# Patient Record
Sex: MALE | Race: WHITE | NOT HISPANIC OR LATINO | Employment: OTHER | URBAN - METROPOLITAN AREA
[De-identification: names, ages, dates, MRNs, and addresses within clinical notes are randomized per-mention and may not be internally consistent; named-entity substitution may affect disease eponyms.]

---

## 2017-02-27 ENCOUNTER — OFFICE VISIT (OUTPATIENT)
Dept: FAMILY MEDICINE | Facility: CLINIC | Age: 57
End: 2017-02-27
Payer: COMMERCIAL

## 2017-02-27 VITALS
SYSTOLIC BLOOD PRESSURE: 116 MMHG | TEMPERATURE: 98.3 F | DIASTOLIC BLOOD PRESSURE: 72 MMHG | RESPIRATION RATE: 18 BRPM | BODY MASS INDEX: 39.28 KG/M2 | HEIGHT: 72 IN | WEIGHT: 290 LBS | HEART RATE: 100 BPM | OXYGEN SATURATION: 95 %

## 2017-02-27 DIAGNOSIS — L91.8 SKIN TAG: Primary | ICD-10-CM

## 2017-02-27 PROCEDURE — 11201 RMVL SKIN TAGS EA ADDL 10: CPT | Performed by: FAMILY MEDICINE

## 2017-02-27 PROCEDURE — 11200 RMVL SKIN TAGS UP TO&INC 15: CPT | Performed by: FAMILY MEDICINE

## 2017-02-27 NOTE — NURSING NOTE
"Chief Complaint   Patient presents with     Skin Tags     removal        Initial /72 (BP Location: Left arm, Patient Position: Chair, Cuff Size: Adult Large)  Pulse 100  Temp 98.3  F (36.8  C) (Tympanic)  Resp 18  Ht 5' 11.5\" (1.816 m)  Wt 290 lb (131.5 kg)  SpO2 95%  BMI 39.88 kg/m2 Estimated body mass index is 39.88 kg/(m^2) as calculated from the following:    Height as of this encounter: 5' 11.5\" (1.816 m).    Weight as of this encounter: 290 lb (131.5 kg).  Medication Reconciliation: complete     Yifan Fuentes MA      "

## 2017-02-27 NOTE — MR AVS SNAPSHOT
"              After Visit Summary   2/27/2017    Bladimir Lezama    MRN: [id number]           Patient Information     Date Of Birth          1960        Visit Information        Provider Department      2/27/2017 2:20 PM Marge Maurer MD [facility]        Today's Diagnoses     Skin tag    -  1       Follow-ups after your visit        Who to contact     If you have questions or need follow up information about today's clinic visit or your schedule please contact [facility] directly at [phone].  Normal or non-critical lab and imaging results will be communicated to you by Ge.tthart, letter or phone within 4 business days after the clinic has received the results. If you do not hear from us within 7 days, please contact the clinic through Iotelligentt or phone. If you have a critical or abnormal lab result, we will notify you by phone as soon as possible.  Submit refill requests through [employer] or call your pharmacy and they will forward the refill request to us. Please allow 3 business days for your refill to be completed.          Additional Information About Your Visit        MyChart Information     [employer] gives you secure access to your electronic health record. If you see a primary care provider, you can also send messages to your care team and make appointments. If you have questions, please call your primary care clinic.  If you do not have a primary care provider, please call [phone] and they will assist you.        Care EveryWhere ID     This is your Care EveryWhere ID. This could be used by other organizations to access your [place] medical records  [id number]        Your Vitals Were     Pulse Temperature Respirations Height Pulse Oximetry BMI (Body Mass Index)    100 98.3  F (36.8  C) (Tympanic) 18 5' 11.5\" (1.816 m) 95% 39.88 kg/m2       Blood Pressure from Last 3 Encounters:   02/27/17 116/72   10/31/16 124/76   01/13/16 118/78    Weight from Last 3 Encounters: "   02/27/17 290 lb (131.5 kg)   10/31/16 284 lb (128.8 kg)   01/13/16 278 lb 4 oz (126.2 kg)              We Performed the Following     REMOVAL OF SKIN TAGS, EA ADDTL 10     REMOVAL OF SKIN TAGS, FIRST 15        Primary Care Provider Office Phone # Fax #    Caryl Acosta [phone]       [facility] [address]  [facility] [address]        Thank you!     Thank you for choosing [facility]  for your care. Our goal is always to provide you with excellent care. Hearing back from our patients is one way we can continue to improve our services. Please take a few minutes to complete the written survey that you may receive in the mail after your visit with us. Thank you!             Your Updated Medication List - Protect others around you: Learn how to safely use, store and throw away your medicines at www.disposemymeds.org.          This list is accurate as of: 2/27/17 11:59 PM.  Always use your most recent med list.                   Brand Name Dispense Instructions for use    albuterol 108 (90 BASE) MCG/ACT Inhaler    PROAIR HFA/PROVENTIL HFA/VENTOLIN HFA    1 Inhaler    Inhale 2 puffs into the lungs every 6 hours as needed for shortness of breath / dyspnea or wheezing       ALLEGRA 180 MG tablet   Generic drug:  fexofenadine      Take 180 mg by mouth daily.       methylPREDNISolone 4 MG tablet    MEDROL DOSEPAK    21 tablet    Follow package instructions

## 2017-02-27 NOTE — PROGRESS NOTES
SUBJECTIVE:                                                    Bladimir Lezama is a 56 year old male who presents to clinic today for the following health issues:      Pt in to have skin tags removed. He has skin tag in the groin area which get aggravated with prolonged activity and exercise.       ASSESSMENT:  Skin tags    PLAN:  Using sterile iris scissors, multiple skin tags were snipped off at   their bases in the inner thighs after cleansing with alcohol wipes. As there were many numerous skin tags that were removed, I believe it was around 15-25 total tags that were removed. Bleeding was controlled   by pressure and drysol. Anesthetic was not required. The patient will be alert for any signs of cutaneous   infection, and call if there are any problems.      Marge Maurer MD

## 2017-05-08 ENCOUNTER — OFFICE VISIT (OUTPATIENT)
Dept: FAMILY MEDICINE | Facility: CLINIC | Age: 57
End: 2017-05-08
Payer: COMMERCIAL

## 2017-05-08 VITALS
WEIGHT: 290.75 LBS | TEMPERATURE: 98.5 F | SYSTOLIC BLOOD PRESSURE: 118 MMHG | BODY MASS INDEX: 39.99 KG/M2 | DIASTOLIC BLOOD PRESSURE: 80 MMHG | OXYGEN SATURATION: 93 % | HEART RATE: 103 BPM

## 2017-05-08 DIAGNOSIS — G57.12 MERALGIA PARESTHETICA OF LEFT SIDE: Primary | ICD-10-CM

## 2017-05-08 PROCEDURE — 99213 OFFICE O/P EST LOW 20 MIN: CPT | Performed by: FAMILY MEDICINE

## 2017-05-08 NOTE — MR AVS SNAPSHOT
After Visit Summary   5/8/2017    Bladimir Lezama    MRN: [id number]           Patient Information     Date Of Birth          1960        Visit Information        Provider Department      5/8/2017 6:20 PM Radha Zhang MD [facility]        Today's Diagnoses     Meralgia paresthetica of left side    -  1      Care Instructions    Try to wear looser trousers to take pressure off the left groin area where the sensory nerve passes.        Follow-ups after your visit        Who to contact     If you have questions or need follow up information about today's clinic visit or your schedule please contact [facility] directly at [phone].  Normal or non-critical lab and imaging results will be communicated to you by MyChart, letter or phone within 4 business days after the clinic has received the results. If you do not hear from us within 7 days, please contact the clinic through BeCoup[employer] or phone. If you have a critical or abnormal lab result, we will notify you by phone as soon as possible.  Submit refill requests through [employer] or call your pharmacy and they will forward the refill request to us. Please allow 3 business days for your refill to be completed.          Additional Information About Your Visit        MyChart Information     [employer] gives you secure access to your electronic health record. If you see a primary care provider, you can also send messages to your care team and make appointments. If you have questions, please call your primary care clinic.  If you do not have a primary care provider, please call [phone] and they will assist you.        Care EveryWhere ID     This is your Care EveryWhere ID. This could be used by other organizations to access your [place] medical records  [id number]        Your Vitals Were     Pulse Temperature Pulse Oximetry BMI (Body Mass Index)          103 98.5  F (36.9  C) (Oral) 93% 39.99 kg/m2         Blood Pressure  from Last 3 Encounters:   05/08/17 118/80   02/27/17 116/72   10/31/16 124/76    Weight from Last 3 Encounters:   05/08/17 290 lb 12 oz (131.9 kg)   02/27/17 290 lb (131.5 kg)   10/31/16 284 lb (128.8 kg)              Today, you had the following     No orders found for display       Primary Care Provider Office Phone # Fax #    Radha Zhang [phone]       [facility] [address] [facility] [address]        Thank you!     Thank you for choosing [facility]  for your care. Our goal is always to provide you with excellent care. Hearing back from our patients is one way we can continue to improve our services. Please take a few minutes to complete the written survey that you may receive in the mail after your visit with us. Thank you!             Your Updated Medication List - Protect others around you: Learn how to safely use, store and throw away your medicines at www.disposemymeds.org.          This list is accurate as of: 5/8/17  7:12 PM.  Always use your most recent med list.                   Brand Name Dispense Instructions for use    albuterol 108 (90 BASE) MCG/ACT Inhaler    PROAIR HFA/PROVENTIL HFA/VENTOLIN HFA    1 Inhaler    Inhale 2 puffs into the lungs every 6 hours as needed for shortness of breath / dyspnea or wheezing       ALLEGRA 180 MG tablet   Generic drug:  fexofenadine      Take 180 mg by mouth daily.       methylPREDNISolone 4 MG tablet    MEDROL DOSEPAK    21 tablet    Follow package instructions

## 2017-05-08 NOTE — PROGRESS NOTES
SUBJECTIVE:                                                    Bladimir Lezama is a 56 year old male who presents to clinic today for the following health issues:      Musculoskeletal problem/ Left leg pain      Duration:  1 year, intermittently    Description  Location: left lateral thigh    Intensity:  6/10    Accompanying signs and symptoms:  Burning/tingling sensation     History  Previous similar problem: YES  Previous evaluation:  orthopedic evaluation- sport therapy     Precipitating or alleviating factors:  Trauma or overuse: no  Aggravating factors include: standing and walking    Therapies tried and outcome: massage, stretching and ibuprofen- help a little bit with pain      Burning or needles sensation with slow walking.  Sometimes improves with brisker walking.  Numbness from left outer hip down lateral thigh  Pain does not go below knee.  Some leg swelling in the evenings  Some low back pain.    ROS:  GI/: No recent changes in bowel or bladder habits.   NEURO: No weakness of lower extremities     Problem list and histories reviewed & adjusted, as indicated.  Additional history:         Reviewed and updated as needed this visit by clinical staff  Tobacco  Allergies  Med Hx  Surg Hx  Fam Hx  Soc Hx          OBJECTIVE:                                                    /80 (BP Location: Left arm, Patient Position: Chair, Cuff Size: Adult Large)  Pulse 103  Temp 98.5  F (36.9  C) (Oral)  Wt 290 lb 12 oz (131.9 kg)  SpO2 93%  BMI 39.99 kg/m2  Body mass index is 39.99 kg/(m^2).  GEN:  no apparent distress, morbidly obese man  BACK:  No focal tenderness to palpation over the spinous processes and SI joints.    NEURO:  LE DTR's are 2+ and symmetric.  Negative SLR.   HIP:  left hip with full and painless ROM.  Negative Stinchfield. There is no tenderness to palpation over the greater trochanter.     Diagnostic Test Results:  none      ASSESSMENT/PLAN:                                                         1. Meralgia paresthetica of left side  I think this is meralgia paresthetica.  I showed him a picture of the sensory distribution involved and he noted this was consistent with the area of his symptoms.  Discussed that this can be difficult to treat and that first step is to wear looser trousers.  (He is wearing jeans to appt today.)   Discussed that weight loss would also be beneficial for this condition.  Discussed that we could also refer him to Physical Therapy or Sports Med if symptoms persist.  For pain he can use ice and occasional ibuprofen but recommended against routine (daily) use of NSAIDs.  I would consider gabapentin over daily NSAID.        Radha Zhang MD  [facility]

## 2017-05-08 NOTE — NURSING NOTE
"Chief Complaint   Patient presents with     Musculoskeletal Problem     nerve left leg pain       Initial /80 (BP Location: Left arm, Patient Position: Chair, Cuff Size: Adult Large)  Pulse 103  Temp 98.5  F (36.9  C) (Oral)  Wt 290 lb 12 oz (131.9 kg)  SpO2 93%  BMI 39.99 kg/m2 Estimated body mass index is 39.99 kg/(m^2) as calculated from the following:    Height as of 2/27/17: 5' 11.5\" (1.816 m).    Weight as of this encounter: 290 lb 12 oz (131.9 kg).  Medication Reconciliation: complete Debi Fuentes MA      "

## 2017-05-09 NOTE — PATIENT INSTRUCTIONS
Try to wear looser trousers to take pressure off the left groin area where the sensory nerve passes.

## 2018-06-25 ENCOUNTER — OFFICE VISIT (OUTPATIENT)
Dept: FAMILY MEDICINE | Facility: CLINIC | Age: 58
End: 2018-06-25
Payer: COMMERCIAL

## 2018-06-25 VITALS
BODY MASS INDEX: 38.6 KG/M2 | TEMPERATURE: 99.5 F | HEART RATE: 105 BPM | OXYGEN SATURATION: 94 % | WEIGHT: 285 LBS | SYSTOLIC BLOOD PRESSURE: 123 MMHG | HEIGHT: 72 IN | RESPIRATION RATE: 16 BRPM | DIASTOLIC BLOOD PRESSURE: 76 MMHG

## 2018-06-25 DIAGNOSIS — L91.8 SKIN TAG: Primary | ICD-10-CM

## 2018-06-25 PROBLEM — E66.01 MORBID OBESITY (H): Status: ACTIVE | Noted: 2018-06-25

## 2018-06-25 PROCEDURE — 11200 RMVL SKIN TAGS UP TO&INC 15: CPT | Performed by: FAMILY MEDICINE

## 2018-06-25 NOTE — PROGRESS NOTES
SUBJECTIVE:   Bladimir Lezama is a 57 year old male who presents to clinic today for the following health issues:      Skin Tags      Duration: a year ago    Description (location/character/radiation): inner upper thighs    Intensity:  mild    Accompanying signs and symptoms: they rub and get irritated    History (similar episodes/previous evaluation): recurrent - every couple years    Precipitating or alleviating factors: None    Therapies tried and outcome: He gets them removed every couple of years       Problem list and histories reviewed & adjusted, as indicated.  Additional history: as documented    BP Readings from Last 3 Encounters:   06/25/18 123/76   05/08/17 118/80   02/27/17 116/72    Wt Readings from Last 3 Encounters:   06/25/18 285 lb (129.3 kg)   05/08/17 290 lb 12 oz (131.9 kg)   02/27/17 290 lb (131.5 kg)          Reviewed and updated as needed this visit by clinical staff  Tobacco  Allergies  Med Hx  Surg Hx  Fam Hx  Soc Hx        ROS:  CONST: NEGATIVE for fevers, chills, fatigue  NEURO: NEGATIVE for headaches, dizziness  EYES: NEGATIVE for change in vision   ENT/M: NEGATIVE for change in hearing, dental problems   RESP: NEGATIVE for shortness of breath, cough   CV: NEGATIVE for chest pain, palpitations, peripheral edema  GI: NEGATIVE for nausea, abdominal pain, change in bowel habits  : NEGATIVE for change in urinary habits  INTEG/SKIN: NEGATIVE for rashes, new or changing moles  MS: NEGATIVE for significant arthralgias or myalgias  ENDO: NEGATIVE for change in weight or appetite  PSYCH: NEGATIVE for change in mood     OBJECTIVE:     /76 (BP Location: Right arm, Patient Position: Chair, Cuff Size: Adult Regular)  Pulse 105  Temp 99.5  F (37.5  C) (Tympanic)  Resp 16  Ht 6' (1.829 m)  Wt 285 lb (129.3 kg)  SpO2 94%  BMI 38.65 kg/m2  Body mass index is 38.65 kg/(m^2).  GEN:  no apparent distress  SKIN: multiple skin tags on upper inner thighs with surrounding post-inflammatory  hyperpigmentation    PROCEDURE:  Approximately 15 skin tags were removed with a sterile pick-ups and iris scissors after cleansing the skin with alcohol swab.  Hemostasis was obtained with drysol.    Diagnostic Test Results:  none     ASSESSMENT/PLAN:     1. Skin tag  - REMOVAL OF SKIN TAGS, FIRST 15     Radha Zhang MD  Formerly named [facility]

## 2018-06-25 NOTE — MR AVS SNAPSHOT
After Visit Summary   6/25/2018    Bladimir Lezama    MRN: [id number]           Patient Information     Date Of Birth          1960        Visit Information        Provider Department      6/25/2018 2:40 PM Radha Zhang MD [facility]        Today's Diagnoses     Skin tag    -  1       Follow-ups after your visit        Your next 10 appointments already scheduled     Jul 11, 2018  3:30 PM CDT   (Arrive by 3:15 PM)   New Patient Visit with ODILON Peralta MD   [facility] ([facility])    [address] [place] [id number] [phone]              Who to contact     If you have questions or need follow up information about today's clinic visit or your schedule please contact [facility] directly at [phone].  Normal or non-critical lab and imaging results will be communicated to you by MyChart, letter or phone within 4 business days after the clinic has received the results. If you do not hear from us within 7 days, please contact the clinic through MyChart or phone. If you have a critical or abnormal lab result, we will notify you by phone as soon as possible.  Submit refill requests through [employer] or call your pharmacy and they will forward the refill request to us. Please allow 3 business days for your refill to be completed.          Additional Information About Your Visit        MyChart Information     [employer] gives you secure access to your electronic health record. If you see a primary care provider, you can also send messages to your care team and make appointments. If you have questions, please call your primary care clinic.  If you do not have a primary care provider, please call [phone] and they will assist you.        Care EveryWhere ID     This is your Care EveryWhere ID. This could be used by other organizations to access your [place] medical records  [id number]        Your Vitals  Were     Pulse Temperature Respirations Height Pulse Oximetry BMI (Body Mass Index)    105 99.5  F (37.5  C) (Tympanic) 16 6' (1.829 m) 94% 38.65 kg/m2       Blood Pressure from Last 3 Encounters:   06/25/18 123/76   05/08/17 118/80   02/27/17 116/72    Weight from Last 3 Encounters:   06/25/18 285 lb (129.3 kg)   05/08/17 290 lb 12 oz (131.9 kg)   02/27/17 290 lb (131.5 kg)              We Performed the Following     REMOVAL OF SKIN TAGS, FIRST 15          Today's Medication Changes          These changes are accurate as of 6/25/18  6:43 PM.  If you have any questions, ask your nurse or doctor.               Stop taking these medicines if you haven't already. Please contact your care team if you have questions.     methylPREDNISolone 4 MG tablet   Commonly known as:  MEDROL DOSEPAK   Stopped by:  Radha Zhang MD                    Primary Care Provider Office Phone # Fax #    Radha Zhang [phone] [address] [facility] [address]        Equal Access to Services     [facility]: Hadii antoinette walker Sojolene, waaxda lusharon, qaybta kaalmada gonzalez, danielle nagy . So [facility] [phone].    ATENCIÓN: Si habla español, tiene a quinn disposición servicios gratuitos de asistencia lingüística. [facility] [phone].    We comply with applicable federal civil rights laws and [place] laws. We do not discriminate on the basis of race, color, national origin, age, disability, sex, sexual orientation, or gender identity.            Thank you!     Thank you for choosing [facility]  for your care. Our goal is always to provide you with excellent care. Hearing back from our patients is one way we can continue to improve our services. Please take a few minutes to complete the written survey that you may receive in the mail after your visit with us. Thank you!             Your Updated Medication List - Protect others around you: Learn how to  safely use, store and throw away your medicines at www.disposemymeds.org.          This list is accurate as of 6/25/18  6:43 PM.  Always use your most recent med list.                   Brand Name Dispense Instructions for use Diagnosis    albuterol 108 (90 Base) MCG/ACT Inhaler    PROAIR HFA/PROVENTIL HFA/VENTOLIN HFA    1 Inhaler    Inhale 2 puffs into the lungs every 6 hours as needed for shortness of breath / dyspnea or wheezing    Perennial allergic rhinitis, unspecified allergic rhinitis trigger       ALLEGRA 180 MG tablet   Generic drug:  fexofenadine      Take 180 mg by mouth daily.

## 2019-05-22 ENCOUNTER — OFFICE VISIT (OUTPATIENT)
Dept: FAMILY MEDICINE | Facility: CLINIC | Age: 59
End: 2019-05-22

## 2019-05-22 ENCOUNTER — HOSPITAL ENCOUNTER (OUTPATIENT)
Dept: ULTRASOUND IMAGING | Facility: CLINIC | Age: 59
Discharge: HOME | End: 2019-05-22
Attending: FAMILY MEDICINE | Admitting: FAMILY MEDICINE
Payer: COMMERCIAL

## 2019-05-22 VITALS
TEMPERATURE: 98.3 F | SYSTOLIC BLOOD PRESSURE: 114 MMHG | DIASTOLIC BLOOD PRESSURE: 76 MMHG | RESPIRATION RATE: 16 BRPM | WEIGHT: 287 LBS | OXYGEN SATURATION: 93 % | BODY MASS INDEX: 38.87 KG/M2 | HEIGHT: 72 IN | HEART RATE: 89 BPM

## 2019-05-22 DIAGNOSIS — Z78.9 HISTORY OF RECENT AIR TRAVEL: ICD-10-CM

## 2019-05-22 DIAGNOSIS — Z00.00 ROUTINE GENERAL MEDICAL EXAMINATION AT A HEALTH CARE FACILITY: Primary | ICD-10-CM

## 2019-05-22 DIAGNOSIS — R73.01 IMPAIRED FASTING GLUCOSE: ICD-10-CM

## 2019-05-22 DIAGNOSIS — R60.0 BILATERAL LOWER EXTREMITY EDEMA: ICD-10-CM

## 2019-05-22 DIAGNOSIS — N52.9 ERECTILE DYSFUNCTION, UNSPECIFIED ERECTILE DYSFUNCTION TYPE: ICD-10-CM

## 2019-05-22 LAB
ANION GAP SERPL CALCULATED.3IONS-SCNC: 4 MMOL/L (ref 3–14)
BUN SERPL-MCNC: 14 MG/DL (ref 7–30)
CALCIUM SERPL-MCNC: 9.2 MG/DL (ref 8.5–10.1)
CHLORIDE SERPL-SCNC: 104 MMOL/L (ref 94–109)
CHOLEST SERPL-MCNC: 166 MG/DL
CO2 SERPL-SCNC: 29 MMOL/L (ref 20–32)
CREAT SERPL-MCNC: 0.93 MG/DL (ref 0.66–1.25)
GFR SERPL CREATININE-BSD FRML MDRD: >90 ML/MIN/{1.73_M2}
GLUCOSE SERPL-MCNC: 172 MG/DL (ref 70–99)
HBA1C MFR BLD: 9.3 % (ref 0–5.6)
HDLC SERPL-MCNC: 38 MG/DL
LDLC SERPL CALC-MCNC: 100 MG/DL
NONHDLC SERPL-MCNC: 128 MG/DL
POTASSIUM SERPL-SCNC: 4.3 MMOL/L (ref 3.4–5.3)
SODIUM SERPL-SCNC: 137 MMOL/L (ref 133–144)
TRIGL SERPL-MCNC: 141 MG/DL

## 2019-05-22 PROCEDURE — 99396 PREV VISIT EST AGE 40-64: CPT | Performed by: FAMILY MEDICINE

## 2019-05-22 PROCEDURE — 80048 BASIC METABOLIC PNL TOTAL CA: CPT | Performed by: FAMILY MEDICINE

## 2019-05-22 PROCEDURE — 99215 OFFICE O/P EST HI 40 MIN: CPT | Mod: 25 | Performed by: FAMILY MEDICINE

## 2019-05-22 PROCEDURE — 36415 COLL VENOUS BLD VENIPUNCTURE: CPT | Performed by: FAMILY MEDICINE

## 2019-05-22 PROCEDURE — 80061 LIPID PANEL: CPT | Performed by: FAMILY MEDICINE

## 2019-05-22 PROCEDURE — 83036 HEMOGLOBIN GLYCOSYLATED A1C: CPT | Performed by: FAMILY MEDICINE

## 2019-05-22 PROCEDURE — 93970 EXTREMITY STUDY: CPT

## 2019-05-22 RX ORDER — SILDENAFIL CITRATE 20 MG/1
20-80 TABLET ORAL DAILY PRN
Qty: 30 TABLET | Refills: 0 | Status: SHIPPED | OUTPATIENT
Start: 2019-05-22 | End: 2019-05-23

## 2019-05-22 RX ORDER — FLUTICASONE PROPIONATE 50 MCG
1 SPRAY, SUSPENSION (ML) NASAL DAILY PRN
COMMUNITY
Start: 2019-05-22

## 2019-05-22 ASSESSMENT — MIFFLIN-ST. JEOR: SCORE: 2164.82

## 2019-05-22 NOTE — PATIENT INSTRUCTIONS
1) The Shingrix vaccination is a 2-shot series.  The second dose is given 6 months after the first.  (It cannot be given sooner than 2 months after the first dose - at the earliest.)  You should be aware that patients are reporting feeling a bit under the weather after the injection, including fatigue, malaise, and low-grade fever that may last a couple days.  Rarely patients can get a mild, shingles-like rash.   But these symptoms are much better than the Shingles disease.      2) Start your ED medication (sildenafil, cialis or levitra) with lowest dose.  These should not be taken more frequently than once daily.  Increase dose stepwise if inadequately effective.  ED drugs may cause flushing and headache.  Notify me of any visual changes or dizziness.  Go to the emergency room at a hospital for any erection lasting over 4 hours as this is a medical emergency.  Do not take with nitrates (such as nitroglycerin).  Always let medical providers know that you take ED medication and the time of your last dose.       3) Let me know if the skin nodule in your right forearm changes (enlarges, becomes red, swollen, tender).    Preventive Health Recommendations  Male Ages 50 - 64    Yearly exam:             See your health care provider every year in order to  o   Review health changes.   o   Discuss preventive care.    o   Review your medicines if your doctor has prescribed any.     Have a cholesterol test every 5 years, or more frequently if you are at risk for high cholesterol/heart disease.     Have a diabetes test (fasting glucose) every three years. If you are at risk for diabetes, you should have this test more often.     Have a colonoscopy at age 50, or have a yearly FIT test (stool test). These exams will check for colon cancer.      Talk with your health care provider about whether or not a prostate cancer screening test (PSA) is right for you.    You should be tested each year for STDs (sexually transmitted  diseases), if you re at risk.     Shots: Get a flu shot each year. Get a tetanus shot every 10 years.     Nutrition:    Eat at least 5 servings of fruits and vegetables daily.     Eat whole-grain bread, whole-wheat pasta and brown rice instead of white grains and rice.     Get adequate Calcium and Vitamin D.     Lifestyle    Exercise for at least 150 minutes a week (30 minutes a day, 5 days a week). This will help you control your weight and prevent disease.     Limit alcohol to one drink per day.     No smoking.     Wear sunscreen to prevent skin cancer.     See your dentist every six months for an exam and cleaning.     See your eye doctor every 1 to 2 years.

## 2019-05-22 NOTE — RESULT ENCOUNTER NOTE
Tono Garrison,  Your ultrasound shows no clots in the veins of your legs.  Happy travels and try to keep your legs moving on those long flights.  You can do toe/heel raises in your seat or get up and walk the aisles when you can.      Radha Zhang MD

## 2019-05-22 NOTE — RESULT ENCOUNTER NOTE
Tono Garrison,  Your Hemoglobin A1C (a test assessing overall blood sugar control for the last 3 months) from earlier today is quite elevated.  This is consistent with a diagnosis of Type 2 Diabetes.  We are still awaiting the results from your other blood tests.  We should discuss this further at your earliest convenience.  Please schedule an appointment with me when you return to [place].  At this A1c level I expect we can manage this with oral medications, diet, and exercise.      Radha Zhang MD

## 2019-05-22 NOTE — PROGRESS NOTES
"evSUBJECTIVE:   CC: Bladimir Lezama is an 58 year old male who presents for preventative health visit.     Healthy Habits:     Getting at least 3 servings of Calcium per day:  Yes    Bi-annual eye exam:  Yes    Dental care twice a year:  Yes    Sleep apnea or symptoms of sleep apnea:  None    Diet:  Regular (no restrictions)    Frequency of exercise:  2-3 days/week    Duration of exercise:  15-30 minutes    Taking medications regularly:  Not Applicable    Barriers to taking medications:  Not applicable    Medication side effects:  None    PHQ-2 Total Score: 0    Additional concerns today:  Yes    Other non-preventive concerns to address:  1) rash on calf of both legs x 1 week.  This started after extensive air travel (to [place], then [place]).  Both legs were red and slightly warm from mid-shin down to ankle.  Mild swelling.  Had \"burning\" sensation.  No chest pain, palpitations, or shortness of breath.  He tried taking \"venoruton\" (which he obtained at an [place] pharmacy for indication of venous insufficiency) and low dose aspirin.  Now improved.  He is leaving for [place] tomorrow and has friend who is a doctor there has scheduled him for a venous ultrasound there.      2) Cyst/nodule on right outer arm x 1 week, not painful.  3) ED Can achieve erection but has trouble sustaining erection.    Today's PHQ-2 Score:   PHQ-2 ( 1999 [employer]) 5/22/2019   Q1: Little interest or pleasure in doing things 0   Q2: Feeling down, depressed or hopeless 0   PHQ-2 Score 0   Q1: Little interest or pleasure in doing things -   Q2: Feeling down, depressed or hopeless -   PHQ-2 Score -       Abuse: Current or Past(Physical, Sexual or Emotional)- No  Do you feel safe in your environment? Yes    Social History     Tobacco Use     Smoking status: Never Smoker     Smokeless tobacco: Never Used   Substance Use Topics     Alcohol use: Yes     Comment: 1 glass of wine every few days     If you drink alcohol do you typically have >3 drinks per day " or >7 drinks per week? No    Alcohol Use 5/22/2019   Prescreen: >3 drinks/day or >7 drinks/week? -   Prescreen: >3 drinks/day or >7 drinks/week? No       Last PSA:   PSA   Date Value Ref Range Status   06/30/2011 1.14 0 - 4 ug/L Final       Reviewed orders with patient. Reviewed health maintenance and updated orders accordingly - Yes  BP Readings from Last 3 Encounters:   05/22/19 114/76   06/25/18 123/76   05/08/17 118/80    Wt Readings from Last 3 Encounters:   05/22/19 130.2 kg (287 lb)   06/25/18 129.3 kg (285 lb)   05/08/17 131.9 kg (290 lb 12 oz)                    Reviewed and updated as needed this visit by clinical staff  Tobacco  Allergies  Meds  Problems  Med Hx  Surg Hx  Fam Hx  Soc Hx          Reviewed and updated as needed this visit by Provider  Meds  Problems  Med Hx  Fam Hx        Past Medical History:   Diagnosis Date     Impaired fasting glucose 9/17/2014    Glucose 101 9/16/2014       Perennial allergic rhinitis       Past Surgical History:   Procedure Laterality Date     C APPENDECTOMY  1974     COLONOSCOPY  9/2011    2 hyperplastic polyps - no adenomas     TONSILLECTOMY  1970       Review of Systems  CONST: NEGATIVE for fevers/chills/sweats, unexplained weight loss/gain, and POSITIVE for fatigue  EYES: NEGATIVE for change in vision  ENT: NEGATIVE for difficulty hearing/tinnitus, and problems with teeth/gums  BREAST: NEGATIVE for breast lump/discharge  CV: NEGATIVE for chest pain/discomfort, leg pain with exercise, and palpitations  RESP: NEGATIVE for cough/wheeze, and difficulty breathing  GI: NEGATIVE for abdominal pain, blood in bowel movement, and nausea/vomiting/diarrhea  : NEGATIVE for nighttime urination, leaking urine, penile discharge, and concerns about sexual function  MS: NEGATIVE for muscle/joint pain  SKIN: NEGATIVE for rash or mole change  NEURO: NEGATIVE for headache, dizziness/lightheadedness, numbness, memory loss, and loss of coordination  PSYCH: NEGATIVE for  anxiety/stress, problems with sleep, and depression  HEME: NEGATIVE for unexplained lumps, and easy bruising/bleeding  ENDO: NEGATIVE for excessive thirst or urination  ALL: NEGATIVE for hay fever/allergies     OBJECTIVE:   /76 (BP Location: Left arm, Patient Position: Sitting, Cuff Size: Adult Large)   Pulse 89   Temp 98.3  F (36.8  C) (Oral)   Resp 16   Ht 1.829 m (6')   Wt 130.2 kg (287 lb)   SpO2 93%   BMI 38.92 kg/m      Physical Exam  GENERAL: healthy, alert and no distress  EYES: Eyes grossly normal to inspection, PERRL and conjunctivae and sclerae normal  HENT: ear canals and TM's normal, nose and mouth without ulcers or lesions  NECK: no adenopathy, no asymmetry, masses, or scars and thyroid normal to palpation  RESP: lungs clear to auscultation - no rales, rhonchi or wheezes  CV: regular rate and rhythm, normal S1 S2, no S3 or S4, no murmur, click or rub  ABDOMEN: soft, nontender, no hepatosplenomegaly, no masses   MS: no gross musculoskeletal defects noted, no edema  SKIN: no suspicious lesions or rashes  NEURO: Normal strength and tone, mentation intact and speech normal  PSYCH: mentation appears normal, affect normal/bright  BILATERAL LOWER EXTREMITIES:  with trace edema, no palpable cords, no warmth or redness but there is hemosiderin staining of the skin from mid-shin extending distally       ASSESSMENT/PLAN:     1. Routine general medical examination at a health care facility  - Lipid panel reflex to direct LDL Fasting    2. Impaired fasting glucose  - Basic metabolic panel  - Hemoglobin A1c    3. Bilateral lower extremity edema  4. History of recent air travel  Most likely this was an exacerbation of venous insufficiency but given his recent history of very long air travel I recommend venous ultrasound to rule out DVT.  He is leaving [place] tomorrow and I'd prefer to check the ultrasound prior to his leaving.  We scheduled him for that today.    - US Lower  Extremity Venous Duplex Bilateral; Future    5. Erectile dysfunction, unspecified erectile dysfunction type  Discussed use of ED medication.  Start with low dose.  Increase dose stepwise if inadequately effective.  ED drugs may cause flushing and headache.  Notify me of any visual changes or dizziness.  Go to ER for any erection lasting over 4 hours as this is a medical emergency.  Do not take with nitrates.  Always let medical providers know that you take ED medication and last dose.     - sildenafil (REVATIO) 20 MG tablet; Take 1-4 tablets (20-80 mg) by mouth daily as needed (30 minutes prior to intercourse)  Dispense: 30 tablet; Refill: 0     COUNSELING:   Reviewed preventive health counseling, as reflected in patient instructions    Estimated body mass index is 38.92 kg/m  as calculated from the following:    Height as of this encounter: 1.829 m (6').    Weight as of this encounter: 130.2 kg (287 lb).  Weight management plan: Discussed healthy diet and exercise guidelines          Counseling Resources:  ATP IV Guidelines  Pooled Cohorts Equation Calculator  FRAX Risk Assessment  ICSI Preventive Guidelines  Dietary Guidelines for Americans, 2010  USDA's MyPlate  ASA Prophylaxis  Lung CA Screening    Radha Zhang MD  [facility]

## 2019-05-23 ENCOUNTER — TELEPHONE (OUTPATIENT)
Dept: FAMILY MEDICINE | Facility: CLINIC | Age: 59
End: 2019-05-23

## 2019-05-23 DIAGNOSIS — N52.9 ERECTILE DYSFUNCTION, UNSPECIFIED ERECTILE DYSFUNCTION TYPE: ICD-10-CM

## 2019-05-23 RX ORDER — SILDENAFIL CITRATE 20 MG/1
20-80 TABLET ORAL DAILY PRN
Qty: 30 TABLET | Refills: 0 | Status: SHIPPED | OUTPATIENT
Start: 2019-05-23

## 2019-05-23 NOTE — TELEPHONE ENCOUNTER
Reason for Call:  Other call back    Detailed comments:  pt states the 2 prescription that he got from  was not covered by insurance and would like to discuss . Please advise.    Phone Number Patient can be reached at: Home number on file [phone] (home)    Best Time: asap    Can we leave a detailed message on this number? YES    Call taken on 5/23/2019 at 10:32 AM by Danielle Olivares

## 2019-05-23 NOTE — TELEPHONE ENCOUNTER
Patient was prescribed Sildenafil and his insurance does not cover.  Script had been sent to [facility] through [email or web address].  Patient is asking if a hard copy of the prescription can be mailed to his home address so he can find a pharmacy with lower cost.   Only need to call patient back if this is a problem otherwise mail to home address.  Local print prescription pended.  Stephania Zuniga RN

## 2019-05-23 NOTE — RESULT ENCOUNTER NOTE
Tono Garrison,  Your lipid panel (cholesterol) results look good and are stable compared to 3 years ago.  Your basic metabolic panel shows elevated blood sugar consistent with new diagnosis of diabetes but is otherwise normal (normal blood salts and kidney function).   We can talk more about that when you return to [place].  Radha Zhang MD

## 2019-05-24 NOTE — TELEPHONE ENCOUNTER
Place call to patient's home phone. No answer. Voicemail full.     Prescription has been mailed to the address that we have on file for patient.    SHAY Love

## 2019-06-04 ENCOUNTER — MYC MEDICAL ADVICE (OUTPATIENT)
Dept: FAMILY MEDICINE | Facility: CLINIC | Age: 59
End: 2019-06-04

## 2019-06-04 DIAGNOSIS — R22.9 SKIN NODULE: Primary | ICD-10-CM

## 2019-06-09 NOTE — PROGRESS NOTES
Subjective     Bladimir Lezama is a 58 year old male who presents to clinic today for the following health issues:    HPI   Here to discuss new diagnosis of T2DM. I saw patient for annual preventive visit a couple weeks ago and FBG was elevated at 172 while A1c was elevated at 9.3.  Since our last appointment he has read about DM on [employer] website and has cut down on carbs.  He feels better since making the dietary changes - more energy.      Recent Labs   Lab Test 05/22/19  0957 01/13/16  1010 09/16/14  0936   A1C 9.3*  --   --    * 117* 104   HDL 38* 40 57   TRIG 141 119 112   CR 0.93 0.95  --    GFRESTIMATED >90 82  --    GFRESTBLACK >90 >90  African American GFR Calc    --    POTASSIUM 4.3 4.1  --       BP Readings from Last 3 Encounters:   06/10/19 108/74   05/22/19 114/76   06/25/18 123/76    Wt Readings from Last 3 Encounters:   06/10/19 125.6 kg (277 lb)   05/22/19 130.2 kg (287 lb)   06/25/18 129.3 kg (285 lb)              Reviewed and updated as needed this visit by Provider  Meds  Problems         Review of Systems   ROS COMP: Constitutional, HEENT, cardiovascular, pulmonary, GI, , musculoskeletal, neuro, skin, endocrine and psych systems are negative, except as otherwise noted.      Objective    /74 (BP Location: Left arm, Patient Position: Sitting, Cuff Size: Adult Large)   Pulse 76   Temp 98  F (36.7  C) (Tympanic)   Resp 15   Wt 125.6 kg (277 lb)   SpO2 94%   BMI 37.57 kg/m    Body mass index is 37.57 kg/m .  Physical Exam   GEN:  no apparent distress     Diagnostic Test Results:  Labs reviewed in Epic        Assessment & Plan     25 minutes time spent face-to-face, with over 50% spent in counseling/coordination of care regarding:       ICD-10-CM    1. Type 2 diabetes mellitus with hyperglycemia, without long-term current use of insulin (H) E11.65 DIABETES EDUCATOR REFERRAL     blood glucose (NO BRAND SPECIFIED) test strip     blood glucose (NO BRAND SPECIFIED) lancets standard      metFORMIN (GLUCOPHAGE-XR) 500 MG 24 hr tablet     blood glucose monitoring (NO BRAND SPECIFIED) meter device kit     DISCONTINUED: blood glucose monitoring (NO BRAND SPECIFIED) meter device kit   2. Severe obesity (BMI 35.0-35.9 with comorbidity) (H) E66.01     Z68.35    3. Need for vaccination Z23 PPSV23, IM/SUBQ (2+ YRS) - [id number]     ADMIN 1st VACCINE        I had a long discussion with the patient regarding the pathophysiology and natural course of diabetes, role of laboratory monitoring, and the importance of blood sugar control for preventing complications of diabetes mellitus.  We covered the following topics:    Natural progressive course of diabetes and the importance of blood sugar control.  Discussed that controlled diet and weight loss can slow progression or potentially negate the need for medication.      Importance of diet and exercise for improved diabetic control.    Lab monitoring, including A1c testing and goals.    Medication options for diabetes.  Recommended metformin - discussed rationale for starting this early - preventing progression, weight-neutral, and no hypoglycemia.        Immunizations.  PPSV-23 and Hep B.  He is not sure if he received Hep B series so we will check titer with next lab test.    Dilated eye exam annually.  He just had this last week in [place].    Nutrition - limiting carbs and balancing carbs with protein and fats at every meal.  Referred to RD/CDE for further education and glucometer teaching.       Goals for discussion at next visit:    Sequelae of uncontrolled diabetes, including microvascular and macrovascular complications.    Lab monitoring, including ACR.    Statin guidelines for diabetes.  Reviewed that current guidelines recommend high-intensity statin and explained what that is and the rationale for the recommendation.      Immunizations.  Check Hep B titer.      BMI:   Estimated body mass index is 38.92 kg/m  as calculated from the following:    Height as  of 5/22/19: 1.829 m (6').    Weight as of 5/22/19: 130.2 kg (287 lb).   Weight management plan: Discussed healthy diet and exercise guidelines        See Patient Instructions    Return in about 2 months (around 8/10/2019) for Follow-up diabetes.    Radha Zhang MD  [facility]

## 2019-06-10 ENCOUNTER — OFFICE VISIT (OUTPATIENT)
Dept: FAMILY MEDICINE | Facility: CLINIC | Age: 59
End: 2019-06-10
Payer: COMMERCIAL

## 2019-06-10 VITALS
HEART RATE: 76 BPM | DIASTOLIC BLOOD PRESSURE: 74 MMHG | BODY MASS INDEX: 37.57 KG/M2 | RESPIRATION RATE: 15 BRPM | WEIGHT: 277 LBS | TEMPERATURE: 98 F | OXYGEN SATURATION: 94 % | SYSTOLIC BLOOD PRESSURE: 108 MMHG

## 2019-06-10 DIAGNOSIS — E66.01 SEVERE OBESITY (BMI 35.0-35.9 WITH COMORBIDITY) (H): ICD-10-CM

## 2019-06-10 DIAGNOSIS — Z23 NEED FOR VACCINATION: ICD-10-CM

## 2019-06-10 DIAGNOSIS — E11.65 TYPE 2 DIABETES MELLITUS WITH HYPERGLYCEMIA, WITHOUT LONG-TERM CURRENT USE OF INSULIN (H): Primary | ICD-10-CM

## 2019-06-10 PROBLEM — E11.9 TYPE 2 DIABETES MELLITUS (H): Status: ACTIVE | Noted: 2019-06-10

## 2019-06-10 PROCEDURE — 90732 PPSV23 VACC 2 YRS+ SUBQ/IM: CPT | Performed by: FAMILY MEDICINE

## 2019-06-10 PROCEDURE — 90471 IMMUNIZATION ADMIN: CPT | Performed by: FAMILY MEDICINE

## 2019-06-10 PROCEDURE — 99214 OFFICE O/P EST MOD 30 MIN: CPT | Mod: 25 | Performed by: FAMILY MEDICINE

## 2019-06-10 RX ORDER — METFORMIN HCL 500 MG
1000 TABLET, EXTENDED RELEASE 24 HR ORAL
Qty: 180 TABLET | Refills: 0 | Status: SHIPPED | OUTPATIENT
Start: 2019-06-10 | End: 2019-08-27

## 2019-06-10 NOTE — PATIENT INSTRUCTIONS
Patient Education     Understanding Type 2 Diabetes  When your body is working normally, the food you eat is digested and used as fuel. This fuel supplies energy to the body s cells. When you have diabetes, the fuel can t enter the cells. Without treatment, diabetes can cause serious long-term health problems.     Your body breaks down the food you eat into glucose.   How the body gets energy  The digestive system breaks down food, resulting in a sugar called glucose. Some of this glucose is stored in the liver. But most of it enters the bloodstream and travels to the cells to be used as fuel. Glucose needs the help of a hormone called insulin to enter the cells. Insulin is made in the pancreas. It is released into the bloodstream in response to the presence of glucose in the blood. Think of insulin as a key. When insulin reaches a cell, it attaches to the cell wall. This signals the cell to create an opening that allows glucose to enter the cell.      When you have type 2 diabetes  Early in type 2 diabetes, your cells don t respond properly to insulin. Because of this, less glucose than normal moves into cells. This is called insulin resistance. In response, the pancreas makes more insulin. But eventually, the pancreas can t produce enough insulin to overcome insulin resistance. As less and less glucose enters cells, it builds up to a harmful level in the bloodstream. This is known as high blood sugar or hyperglycemia. The result is type 2 diabetes. The cells become starved for energy, which can leave you feeling tired and rundown.  Why high blood sugar is a problem  If high blood sugar is not controlled, blood vessels throughout the body become damaged. Prolonged high blood sugar affects organs, blood vessels, and nerves. As a result, the risks of damage to the heart, kidneys, eyes, and limbs increase. Diabetes also makes other problems, such as high blood pressure and high cholesterol, more dangerous. Over  time, people with uncontrolled high blood sugar have an increase in risk of dying of, or being disabled by, heart attack, heart failure,  or stroke.  Date Last Reviewed: 7/1/2016 2000-2018 [employer]. [address] [place] [address], [place] [address]. All rights reserved. This information is not intended as a substitute for professional medical care. Always follow your healthcare professional's instructions.           Patient Education     Understanding Carbohydrates, Fats, and Protein  Food is a source of fuel and nourishment for your body. It s also a source of pleasure. Having diabetes doesn t mean you have to eat special foods or give up desserts. Instead, your dietitian can show you how to plan meals to suit your body. To start, learn how different foods affect blood sugar.  Carbohydrates  Carbohydrates are the main source of fuel for the body. Carbohydrates raise blood sugar. Many people think carbohydrates are only found in pasta or bread. But carbohydrates are actually in many kinds of foods:    Sugars occur naturally in foods such as fruit, milk, honey, and molasses. Sugars can also be added to many foods, from cereals and yogurt to candy and desserts. Sugars raise blood sugar.    Starches are found in bread, cereals, pasta, and dried beans. They re also found in corn, peas, potatoes, yam, acorn squash, and butternut squash. Starches also raise blood sugar.     Fiber is found in foods such as vegetables, fruits, beans, and whole grains. Unlike other carbs, fiber isn t digested or absorbed. So it doesn t raise blood sugar. In fact, fiber can help keep blood sugar from rising too fast. It also helps keep blood cholesterol at a healthy level.  Did you know?  Even though carbohydrates raise blood sugar, it s best to have some in every meal. They are an important part of a healthy diet.   Fat  Fat is an energy source that can be stored until needed. Fat does not raise blood sugar. However, it can raise  blood cholesterol, increasing the risk of heart disease. Fat is also high in calories, which can cause weight gain. Not all types of fat are the same.  More Healthy:    Monounsaturated fats are mostly found in vegetable oils, such as olive, canola, and peanut oils. They are also found in avocados and some nuts. Monounsaturated fats are healthy for your heart. That s because they lower LDL (unhealthy) cholesterol.    Polyunsaturated fats are mostly found in vegetable oils, such as corn, safflower, and soybean oils. They are also found in some seeds, nuts, and fish. Polyunsaturated fats lower LDL (unhealthy) cholesterol. So, choosing them instead of saturated fats is healthy for your heart. Certain unsaturated fats can help lower triglycerides.   Less Healthy:    Saturated fats are found in animal products, such as meat, poultry, whole milk, lard, and butter. Saturated fats raise LDL cholesterol and are not healthy for your heart.    Hydrogenated oils and trans fats are formed when vegetable oils are processed into solid fats. They are found in many processed foods. Hydrogenated oils and trans fats raise LDL cholesterol and lower HDL (healthy) cholesterol. They are not healthy for your heart.  Protein  Protein helps the body build and repair muscle and other tissue. Protein has little or no effect on blood sugar. However, many foods that contain protein also contain saturated fat. By choosing low-fat protein sources, you can get the benefits of protein without the extra fat:    Plant protein is found in dry beans and peas, nuts, and soy products, such as tofu and soymilk. These sources tend to be cholesterol-free and low in saturated fat.    Animal protein is found in fish, poultry, meat, cheese, milk, and eggs. These contain cholesterol and can be high in saturated fat. Aim for lean, lower-fat choices.  Date Last Reviewed: 3/1/2016    7469-3017 [employer]. [address] [place] [address], [place] [address]. All  rights reserved. This information is not intended as a substitute for professional medical care. Always follow your healthcare professional's instructions.         Start metformin with one tablet daily with food.  If you tolerate that you can increase to 2 tablets once daily with food.  If you tolerate that you can increase to 1 tablet in the morning with food and 2 tablets in the evening with food.

## 2019-06-10 NOTE — NURSING NOTE
Screening Questionnaire for Adult Immunization    Are you sick today?   No   Do you have allergies to medications, food, a vaccine component or latex?   No   Have you ever had a serious reaction after receiving a vaccination?   No   Do you have a long-term health problem with heart disease, lung disease, asthma, kidney disease, metabolic disease (e.g. diabetes), anemia, or other blood disorder?   No   Do you have cancer, leukemia, HIV/AIDS, or any other immune system problem?   No   In the past 3 months, have you taken medications that affect  your immune system, such as prednisone, other steroids, or anticancer drugs; drugs for the treatment of rheumatoid arthritis, Crohn s disease, or psoriasis; or have you had radiation treatments?   No   Have you had a seizure, or a brain or other nervous system problem?   No   During the past year, have you received a transfusion of blood or blood     products, or been given immune (gamma) globulin or antiviral drug?   No   For women: Are you pregnant or is there a chance you could become        pregnant during the next month?   No   Have you received any vaccinations in the past 4 weeks?   No     Immunization questionnaire answers were all negative.        Per orders of Dr. Zhang, injection of PCV23 given by Velma Jacinto. Patient instructed to remain in clinic for 15 minutes afterwards, and to report any adverse reaction to me immediately.       Screening performed by Velma Jacinto on 6/10/2019 at 9:21 AM.

## 2019-06-12 ENCOUNTER — TELEPHONE (OUTPATIENT)
Dept: FAMILY MEDICINE | Facility: CLINIC | Age: 59
End: 2019-06-12

## 2019-06-12 NOTE — TELEPHONE ENCOUNTER
Diabetes Education Scheduling Outreach #1:    Call to patient to schedule. Voicemail full.    Plan for 2nd outreach attempt within 1 week.    [facility]  Diabetes and Nutrition Scheduling

## 2019-06-27 NOTE — TELEPHONE ENCOUNTER
Diabetes Education Scheduling Outreach #2:    Call to patient to schedule. Voicemail full.      Sarah Douglas  [place] [facility]  Diabetes and Nutrition Scheduling

## 2019-08-27 ENCOUNTER — OFFICE VISIT (OUTPATIENT)
Dept: FAMILY MEDICINE | Facility: CLINIC | Age: 59
End: 2019-08-27
Payer: COMMERCIAL

## 2019-08-27 VITALS
RESPIRATION RATE: 15 BRPM | SYSTOLIC BLOOD PRESSURE: 102 MMHG | OXYGEN SATURATION: 94 % | TEMPERATURE: 97.3 F | HEART RATE: 70 BPM | WEIGHT: 262.5 LBS | DIASTOLIC BLOOD PRESSURE: 70 MMHG | BODY MASS INDEX: 35.6 KG/M2

## 2019-08-27 DIAGNOSIS — N52.9 ERECTILE DYSFUNCTION, UNSPECIFIED ERECTILE DYSFUNCTION TYPE: ICD-10-CM

## 2019-08-27 DIAGNOSIS — Z01.84 IMMUNITY STATUS TESTING: ICD-10-CM

## 2019-08-27 DIAGNOSIS — E66.01 SEVERE OBESITY (BMI 35.0-35.9 WITH COMORBIDITY) (H): ICD-10-CM

## 2019-08-27 DIAGNOSIS — J30.89 PERENNIAL ALLERGIC RHINITIS: ICD-10-CM

## 2019-08-27 DIAGNOSIS — E11.8 TYPE 2 DIABETES MELLITUS WITH COMPLICATION, WITHOUT LONG-TERM CURRENT USE OF INSULIN (H): Primary | ICD-10-CM

## 2019-08-27 LAB
ALBUMIN SERPL-MCNC: 4.1 G/DL (ref 3.4–5)
ALP SERPL-CCNC: 49 U/L (ref 40–150)
ALT SERPL W P-5'-P-CCNC: 45 U/L (ref 0–70)
ANION GAP SERPL CALCULATED.3IONS-SCNC: 7 MMOL/L (ref 3–14)
AST SERPL W P-5'-P-CCNC: 23 U/L (ref 0–45)
BASOPHILS # BLD AUTO: 0 10E9/L (ref 0–0.2)
BASOPHILS NFR BLD AUTO: 0.5 %
BILIRUB SERPL-MCNC: 0.9 MG/DL (ref 0.2–1.3)
BUN SERPL-MCNC: 16 MG/DL (ref 7–30)
CALCIUM SERPL-MCNC: 9 MG/DL (ref 8.5–10.1)
CHLORIDE SERPL-SCNC: 107 MMOL/L (ref 94–109)
CO2 SERPL-SCNC: 25 MMOL/L (ref 20–32)
CREAT SERPL-MCNC: 0.88 MG/DL (ref 0.66–1.25)
CREAT UR-MCNC: 100 MG/DL
DIFFERENTIAL METHOD BLD: NORMAL
EOSINOPHIL # BLD AUTO: 0.3 10E9/L (ref 0–0.7)
EOSINOPHIL NFR BLD AUTO: 4.1 %
ERYTHROCYTE [DISTWIDTH] IN BLOOD BY AUTOMATED COUNT: 13.8 % (ref 10–15)
GFR SERPL CREATININE-BSD FRML MDRD: >90 ML/MIN/{1.73_M2}
GLUCOSE SERPL-MCNC: 84 MG/DL (ref 70–99)
HBA1C MFR BLD: 5.9 % (ref 0–5.6)
HBV SURFACE AB SERPL IA-ACNC: 0 M[IU]/ML
HCT VFR BLD AUTO: 46.1 % (ref 40–53)
HGB BLD-MCNC: 15.3 G/DL (ref 13.3–17.7)
LYMPHOCYTES # BLD AUTO: 1.5 10E9/L (ref 0.8–5.3)
LYMPHOCYTES NFR BLD AUTO: 24.4 %
MCH RBC QN AUTO: 29.6 PG (ref 26.5–33)
MCHC RBC AUTO-ENTMCNC: 33.2 G/DL (ref 31.5–36.5)
MCV RBC AUTO: 89 FL (ref 78–100)
MICROALBUMIN UR-MCNC: 7 MG/L
MICROALBUMIN/CREAT UR: 6.66 MG/G CR (ref 0–17)
MONOCYTES # BLD AUTO: 0.8 10E9/L (ref 0–1.3)
MONOCYTES NFR BLD AUTO: 11.9 %
NEUTROPHILS # BLD AUTO: 3.7 10E9/L (ref 1.6–8.3)
NEUTROPHILS NFR BLD AUTO: 59.1 %
PLATELET # BLD AUTO: 172 10E9/L (ref 150–450)
POTASSIUM SERPL-SCNC: 4.4 MMOL/L (ref 3.4–5.3)
PROT SERPL-MCNC: 7.6 G/DL (ref 6.8–8.8)
RBC # BLD AUTO: 5.17 10E12/L (ref 4.4–5.9)
SODIUM SERPL-SCNC: 139 MMOL/L (ref 133–144)
TSH SERPL DL<=0.005 MIU/L-ACNC: 1.9 MU/L (ref 0.4–4)
WBC # BLD AUTO: 6.3 10E9/L (ref 4–11)

## 2019-08-27 PROCEDURE — 80053 COMPREHEN METABOLIC PANEL: CPT | Performed by: FAMILY MEDICINE

## 2019-08-27 PROCEDURE — 83036 HEMOGLOBIN GLYCOSYLATED A1C: CPT | Performed by: FAMILY MEDICINE

## 2019-08-27 PROCEDURE — 36415 COLL VENOUS BLD VENIPUNCTURE: CPT | Performed by: FAMILY MEDICINE

## 2019-08-27 PROCEDURE — 86706 HEP B SURFACE ANTIBODY: CPT | Performed by: FAMILY MEDICINE

## 2019-08-27 PROCEDURE — 99207 C FOOT EXAM  NO CHARGE: CPT | Performed by: FAMILY MEDICINE

## 2019-08-27 PROCEDURE — 84443 ASSAY THYROID STIM HORMONE: CPT | Performed by: FAMILY MEDICINE

## 2019-08-27 PROCEDURE — 85025 COMPLETE CBC W/AUTO DIFF WBC: CPT | Performed by: FAMILY MEDICINE

## 2019-08-27 PROCEDURE — 82043 UR ALBUMIN QUANTITATIVE: CPT | Performed by: FAMILY MEDICINE

## 2019-08-27 PROCEDURE — 99214 OFFICE O/P EST MOD 30 MIN: CPT | Performed by: FAMILY MEDICINE

## 2019-08-27 RX ORDER — METFORMIN HCL 500 MG
1000 TABLET, EXTENDED RELEASE 24 HR ORAL
Qty: 180 TABLET | Refills: 0 | Status: SHIPPED | OUTPATIENT
Start: 2019-08-27 | End: 2019-11-25

## 2019-08-27 RX ORDER — ATORVASTATIN CALCIUM 20 MG/1
20 TABLET, FILM COATED ORAL DAILY
Status: CANCELLED | OUTPATIENT
Start: 2019-08-27

## 2019-08-27 NOTE — PATIENT INSTRUCTIONS
Diabetes improved  continue with diet, exercise and weight loss  Continue metformin   Lab pending  dilated eye exam yearly  Foot exam regularly  Consider statin med to lower risk of cardiovascular disease from diabetes  BP nomral no need fo rBP meds but if has protein in urine may reocmend low dpse to protect kidneys  Flu shot yearly   considre shingles vaccine  Check Hepb status and if not immune get Hep b vaccination series  Return to see Dr Zhang in 3 month for recheck

## 2019-08-27 NOTE — RESULT ENCOUNTER NOTE
Higinio Mr. Lezama,   -TSH (thyroid stimulating hormone) level is normal which indicates normal thyroid function.. If you have any further concerns please do not hesitate to contact us by message, phone or making an appointment.  Have a good day   Dr Kevin ROBIN

## 2019-08-27 NOTE — PROGRESS NOTES
Subjective     Bladimir Lezama is a 59 year old male who presents to clinic today for the following health issues:    HPI   Diabetes Follow-up    How often are you checking your blood sugar? A few times a month    What time of day are you checking your blood sugars (select all that apply)?  Before meals and after meals randomly     Have you had any blood sugars above 200?  No    Have you had any blood sugars below 70?  No    What symptoms do you notice when your blood sugar is low?  Other: nervous     What concerns do you have today about your diabetes? None     Do you have any of these symptoms? (Select all that apply)  No numbness or tingling in feet.  No redness, sores or blisters on feet.  No complaints of excessive thirst.  No reports of blurry vision.  No significant changes to weight. - weight loss due to diet and feet problem only in the past      Have you had a diabetic eye exam in the last 12 months? Yes- Date of last eye exam: 8/2019    BP Readings from Last 2 Encounters:   08/27/19 102/70   06/10/19 108/74     Hemoglobin A1C (%)   Date Value   08/27/2019 5.9 (H)   05/22/2019 9.3 (H)     LDL Cholesterol Calculated (mg/dL)   Date Value   05/22/2019 100 (H)   01/13/2016 117 (H)   Diabetes Management Resources    How many servings of fruits and vegetables do you eat daily?  2-3    On average, how many sweetened beverages do you drink each day (soda, juice, sweet tea, etc)?   0    How many days per week do you miss taking your medication? 0    BMI > 38, DM newly dx in may 2019 on metformin, ED on revatio prn, seasonal allergies, perineal allergic rhinitis on Flonase prn, prior appy & tonsillectomy, hx of colonoscopy in 2011, 2 hyperplastic polyps & advised recheck in 10 yrs, on albuterol prn for reported allergy related wheezing in the past, no official dx of asthma not used in a couple years, seen by PCP Dr Zhang on 6/10/19, when new dx of DM discussed, started on metformin, referred to DM ed and given  pneumovax 23. Reported then he had done his dilated eye exam in [place] where he travels to often ( from there originally). He was not sure if he received Hep B series so was advised to check titers with his next lab test. Born and brought up in [place]. Then lived 10 yrs in [place]. Has two children from his first marriage. Moved to [place] 10 yrs ago when met his wife who is from here. 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  Has a dog and a cat in [place].       DM: checking sugars sporadically. Brought a print out. Average over 67 checks last 3 months about 118. tolerating metformin. Weight down 30 lbs since 2017 and 18 lbs since may 2019. Doing Mediterranean diet by [facility]. Declines to see DM ed. HBA1c improved from 9 to 5.9 today. Declines to start statin. Risk benefits discussed.     BMI: working on weight loss.     Allergic rhinitis stable. Albuterol given in [place] in the past for allergies when living in [place]. Was allergic to pollen seasonal allergies, sometimes pushing him to the edge of an asthma attack. But not used in past couple years  Declines a refill.     ED: uses revatio prn    Will do labs today also for Hep B status.     Has left thigh meralgia paresthetica and understands weight loss and DM control will help.     The 10-year ASCVD risk score (Kaleigh BUBBA Jr., et al., 2013) is: 9.6%    Values used to calculate the score:      Age: 59 years      Sex: Male      Is Non- : No      Diabetic: Yes      Tobacco smoker: No      Systolic Blood Pressure: 102 mmHg      Is BP treated: No      HDL Cholesterol: 38 mg/dL      Total Cholesterol: 166 mg/dL    Currently no fever or chills, no headache or dizziness, no double or blurry vision, no facial pain, earache, sore throat, runny nose, post nasal drip, no trouble hearing, smelling, tasting or swallowing, no cough , no chest pain, trouble breathing or palpitations, No abdominal pain, heart burn, reflux, nausea  or vomiting or diarrhea or constipation, no blood in stools or black stools, no weight loss or night sweats. No dysuria, hematuria, frequency, urgency, hesitancy, incontinence, No pelvic complaints. No leg swelling or joint pain. No rash.    Patient Active Problem List   Diagnosis     Perennial allergic rhinitis     Severe obesity (BMI 35.0-35.9 with comorbidity) (H)     Type 2 diabetes mellitus (H)     Erectile dysfunction, unspecified erectile dysfunction type     Past Surgical History:   Procedure Laterality Date     C APPENDECTOMY  1974     COLONOSCOPY  9/2011    2 hyperplastic polyps - no adenomas     TONSILLECTOMY  1970       Social History     Tobacco Use     Smoking status: Never Smoker     Smokeless tobacco: Never Used   Substance Use Topics     Alcohol use: Yes     Comment: 1 glass of wine every few days     Family History   Problem Relation Age of Onset     Cancer Father         stomach     Gastrointestinal Disease Father         ulcers         Current Outpatient Medications   Medication Sig Dispense Refill     albuterol (PROAIR HFA, PROVENTIL HFA, VENTOLIN HFA) 108 (90 BASE) MCG/ACT inhaler Inhale 2 puffs into the lungs every 6 hours as needed for shortness of breath / dyspnea or wheezing 1 Inhaler 1     blood glucose (NO BRAND SPECIFIED) lancets standard Use to test blood sugar 1 times daily or as directed. 100 each 3     blood glucose (NO BRAND SPECIFIED) test strip Use to test blood sugar 1 times daily or as directed. To accompany: Blood Glucose Monitor Brands: per insurance. 100 strip 6     blood glucose monitoring (NO BRAND SPECIFIED) meter device kit Use to test blood sugar 1 times daily or as directed. Preferred blood glucose meter per insurance. 1 kit 0     fluticasone (FLONASE) 50 MCG/ACT nasal spray Spray 1 spray into both nostrils daily as needed for rhinitis or allergies       metFORMIN (GLUCOPHAGE-XR) 500 MG 24 hr tablet Take 2 tablets (1,000 mg) by mouth daily (with dinner) 180 tablet 0      sildenafil (REVATIO) 20 MG tablet Take 1-4 tablets (20-80 mg) by mouth daily as needed (30 minutes prior to intercourse) 30 tablet 0     Allergies   Allergen Reactions     Seasonal Allergies      Recent Labs   Lab Test 08/27/19  0937 05/22/19  0957 01/13/16  1010 09/16/14  0936   A1C 5.9* 9.3*  --   --    LDL  --  100* 117* 104   HDL  --  38* 40 57   TRIG  --  141 119 112   ALT 45  --   --   --    CR 0.88 0.93 0.95  --    GFRESTIMATED >90 >90 82  --    GFRESTBLACK >90 >90 >90  African American GFR Calc    --    POTASSIUM 4.4 4.3 4.1  --    TSH 1.90  --   --   --       BP Readings from Last 3 Encounters:   08/27/19 102/70   06/10/19 108/74   05/22/19 114/76    Wt Readings from Last 3 Encounters:   08/27/19 119.1 kg (262 lb 8 oz)   06/10/19 125.6 kg (277 lb)   05/22/19 130.2 kg (287 lb)                    Reviewed and updated as needed this visit by Provider  Tobacco  Allergies  Meds  Problems  Med Hx  Surg Hx  Fam Hx  Soc Hx          Review of Systems   ROS COMP: Constitutional, HEENT, cardiovascular, pulmonary, GI, , musculoskeletal, neuro, skin, endocrine and psych systems are negative, except as otherwise noted.      Objective    /70 (BP Location: Left arm, Patient Position: Chair, Cuff Size: Adult Large)   Pulse 70   Temp 97.3  F (36.3  C) (Tympanic)   Resp 15   Wt 119.1 kg (262 lb 8 oz)   SpO2 94%   BMI 35.60 kg/m    Body mass index is 35.6 kg/m .  Physical Exam   GENERAL: alert, no distress and obese  EYES: Eyes grossly normal to inspection, PERRL and conjunctivae and sclerae normal  HENT: ear canals and TM's normal, nose and mouth without ulcers or lesions  NECK: no adenopathy, no asymmetry, masses, or scars and thyroid normal to palpation  RESP: lungs clear to auscultation - no rales, rhonchi or wheezes  CV: regular rate and rhythm, normal S1 S2, no S3 or S4, no murmur, click or rub, no peripheral edema and peripheral pulses strong  ABDOMEN: soft, non tender, no hepatosplenomegaly, no  masses and bowel sounds normal  MS: no gross musculoskeletal defects noted, no edema  SKIN: no suspicious lesions or rashes  NEURO: Normal strength and tone, mentation intact and speech normal  PSYCH: mentation appears normal, affect normal/bright  Diabetic foot exam: normal DP and PT pulses, no trophic changes or ulcerative lesions, normal sensory exam, normal monofilament exam, nail exam normal nails without lesions and normal vibration sense B/l.     Diagnostic Test Results:  Labs reviewed in Epic  No results found for this or any previous visit (from the past 24 hour(s)).        Assessment & Plan     1. Type 2 diabetes mellitus with complication, without long-term current use of insulin (H)  Diabetes improved. Discussed sequelae of uncontrolled diabetes, including microvascular and macrovascular complications. Lab monitoring, office visits & Statin guidelines for diabetes.  Reviewed that current guidelines recommend high-intensity statin and explained what that is and the rationale for the recommendation. Currently declines DM ed , feels doing well with Mediterranean diet through [facility] newsletter. Also currently declines a statin. [place] decision made to hold off after going through shared decision making in clinic today. He has made progress and DM now controlled. Advised to continue with diet, exercise and weight loss. Continue metformin 2 gram XR total daily. Rest of lab's pending. Reminded to do a dilated eye exam yearly and reported had one in [place] in June 2019 that was reported normal. Foot exam done normal and advised to check feet regularly. ED likely result of microvascular disease and control will help ED in the long run. Consider statin med in the future to lower risk of cardiovascular disease from diabetes. BP normal no need for BP meds but if has microalbuminuria may recommend low dose to protect his kidneys. Advised Flu shot yearly. Consider the shingles vaccine. Will check Hep B status  today and if not immune advised he get Hep B vaccination series of three at 0, 2 and 4 month intervals in an MA only apt. Return to see PCP Dr Zhang in 3 month for recheck.  - Albumin Random Urine Quantitative with Creat Ratio  - CBC with platelets differential  - Comprehensive metabolic panel  - C FOOT EXAM  NO CHARGE  - Hemoglobin A1c  - TSH with free T4 reflex  - metFORMIN (GLUCOPHAGE-XR) 500 MG 24 hr tablet; Take 2 tablets (1,000 mg) by mouth daily (with dinner)  Dispense: 180 tablet; Refill: 0    2. Severe obesity (BMI 35.0-35.9 with comorbidity) (H)  Working on diet, exercise dn weight loss, has already lost about 18 lbs on Mediterranean diet and congratulated. Advised to increase aerobic activity.   - CBC with platelets differential    3. Perennial allergic rhinitis  Uses Flonase and albuterol as needed.  - CBC with platelets differential    4. Erectile dysfunction, unspecified erectile dysfunction type  Likely related to microvascular disease. Controlling DM and optimizing vascular health will help.  - CBC with platelets differential    5. Immunity status testing  - Hepatitis B Surface Antibody     BMI:   Estimated body mass index is 35.6 kg/m  as calculated from the following:    Height as of 5/22/19: 1.829 m (6').    Weight as of this encounter: 119.1 kg (262 lb 8 oz).   Weight management plan: Discussed healthy diet and exercise guidelines  Work on weight loss  Regular exercise  See Patient Instructions    Return in about 3 months (around 11/27/2019).    Tomeka Brown MD  [facility]

## 2019-08-27 NOTE — RESULT ENCOUNTER NOTE
Haydeedelia Mr. Lezama,  Your results came back and are within acceptable limits. -Microalbumin (urine protein) test is normal.  ADVISE: rechecking this annually. Since this is normal and BP on lower end will defer starting you on a medication like ACE inhibitor but can revisit with Dr Zhang when you see her next or if anything changes before that. To protect the kidneys would recommend antiinflammatories as much as possble meds like aleve, advil, motrin etc. If you have any further concerns please do not hesitate to contact us by message, phone or making an appointment.  Have a good day   Dr Kevin ROBIN

## 2019-08-27 NOTE — RESULT ENCOUNTER NOTE
Higinio Mr. Lezama,  Your results came back showing no immunity against Hep B so recommend getting Hep B vaccine series of 3 shots at 0, 2 and 4 month intervals. Can schedule with medical assistant at your convenience.  If you have any further concerns please do not hesitate to contact us by message, phone or making an appointment.  Have a good day   Dr Kevin ROBIN

## 2019-08-27 NOTE — RESULT ENCOUNTER NOTE
Higinio Mr. Lezama,  Your results came back and are within acceptable limits. -Liver and gallbladder tests are normal (ALT,AST, Alk phos, bilirubin), kidney function is normal (Cr, GFR), sodium is normal, potassium is normal, calcium is normal, glucose is normal.. If you have any further concerns please do not hesitate to contact us by message, phone or making an appointment.  Have a good day   Dr Kevin ROBIN

## 2019-11-25 ENCOUNTER — OFFICE VISIT (OUTPATIENT)
Dept: FAMILY MEDICINE | Facility: CLINIC | Age: 59
End: 2019-11-25
Payer: COMMERCIAL

## 2019-11-25 VITALS
BODY MASS INDEX: 34.72 KG/M2 | SYSTOLIC BLOOD PRESSURE: 118 MMHG | HEART RATE: 81 BPM | DIASTOLIC BLOOD PRESSURE: 80 MMHG | TEMPERATURE: 98 F | OXYGEN SATURATION: 95 % | WEIGHT: 256 LBS

## 2019-11-25 DIAGNOSIS — Z11.4 ENCOUNTER FOR SCREENING FOR HIV: ICD-10-CM

## 2019-11-25 DIAGNOSIS — E78.5 HYPERLIPIDEMIA, UNSPECIFIED HYPERLIPIDEMIA TYPE: ICD-10-CM

## 2019-11-25 DIAGNOSIS — E11.8 TYPE 2 DIABETES MELLITUS WITH COMPLICATION, WITHOUT LONG-TERM CURRENT USE OF INSULIN (H): ICD-10-CM

## 2019-11-25 LAB
CHOLEST SERPL-MCNC: 193 MG/DL
HBA1C MFR BLD: 5.3 % (ref 0–5.6)
HDLC SERPL-MCNC: 52 MG/DL
HIV 1+2 AB+HIV1 P24 AG SERPL QL IA: NONREACTIVE
LDLC SERPL CALC-MCNC: 120 MG/DL
NONHDLC SERPL-MCNC: 141 MG/DL
TRIGL SERPL-MCNC: 107 MG/DL

## 2019-11-25 PROCEDURE — 80061 LIPID PANEL: CPT | Performed by: FAMILY MEDICINE

## 2019-11-25 PROCEDURE — 99214 OFFICE O/P EST MOD 30 MIN: CPT | Performed by: FAMILY MEDICINE

## 2019-11-25 PROCEDURE — 87389 HIV-1 AG W/HIV-1&-2 AB AG IA: CPT | Performed by: FAMILY MEDICINE

## 2019-11-25 PROCEDURE — 83036 HEMOGLOBIN GLYCOSYLATED A1C: CPT | Performed by: FAMILY MEDICINE

## 2019-11-25 PROCEDURE — 36415 COLL VENOUS BLD VENIPUNCTURE: CPT | Performed by: FAMILY MEDICINE

## 2019-11-25 RX ORDER — METFORMIN HCL 500 MG
1000 TABLET, EXTENDED RELEASE 24 HR ORAL
Qty: 180 TABLET | Refills: 1 | Status: SHIPPED | OUTPATIENT
Start: 2019-11-25 | End: 2020-05-29

## 2019-11-27 ENCOUNTER — TRANSFERRED RECORDS (OUTPATIENT)
Dept: HEALTH INFORMATION MANAGEMENT | Facility: CLINIC | Age: 59
End: 2019-11-27

## 2019-11-27 ENCOUNTER — MYC MEDICAL ADVICE (OUTPATIENT)
Dept: FAMILY MEDICINE | Facility: CLINIC | Age: 59
End: 2019-11-27

## 2019-11-27 LAB — RETINOPATHY: NEGATIVE

## 2019-11-27 NOTE — TELEPHONE ENCOUNTER
Radha Zhang MD,  Please see patient's Insurance [employer] message and attached media    Please advise    Thank You!  Yokasta Rivas, COCO  Triage Nurse

## 2019-12-02 NOTE — TELEPHONE ENCOUNTER
"Team coordinators-Please print off attachment and send to abstraction.    Writer received the following communication:  \"Tono Jansen,     Thank you for your message.  At this time we do not abstract results from patient uploaded records.  I apologize for the inconvenience.  Please let me know if you have any questions about this.     Thanks,   Sharmaine NYE, Abstracting Team  \"    Thank you!  LUCRECIA Arce, RN    "

## 2020-03-01 ENCOUNTER — HEALTH MAINTENANCE LETTER (OUTPATIENT)
Age: 60
End: 2020-03-01

## 2020-06-01 ENCOUNTER — VIRTUAL VISIT (OUTPATIENT)
Dept: FAMILY MEDICINE | Facility: CLINIC | Age: 60
End: 2020-06-01
Payer: COMMERCIAL

## 2020-06-01 ENCOUNTER — MYC MEDICAL ADVICE (OUTPATIENT)
Dept: FAMILY MEDICINE | Facility: CLINIC | Age: 60
End: 2020-06-01

## 2020-06-01 VITALS — BODY MASS INDEX: 33.13 KG/M2 | WEIGHT: 250 LBS | HEIGHT: 73 IN

## 2020-06-01 DIAGNOSIS — E11.9 TYPE 2 DIABETES MELLITUS WITHOUT COMPLICATION, WITHOUT LONG-TERM CURRENT USE OF INSULIN (H): Primary | ICD-10-CM

## 2020-06-01 PROCEDURE — 99213 OFFICE O/P EST LOW 20 MIN: CPT | Mod: 95 | Performed by: FAMILY MEDICINE

## 2020-06-01 ASSESSMENT — MIFFLIN-ST. JEOR: SCORE: 2007.87

## 2020-06-01 NOTE — PATIENT INSTRUCTIONS
Schedule a fasting lab-only appointment.  Fast for 10 hours prior to labs: nothing to eat or drink except plain water and your pills for ten hours prior to appointment.  In addition, avoid fatty foods and alcohol for 24 hours prior to appointment.

## 2020-06-01 NOTE — PROGRESS NOTES
"Bladimir Lezama is a 59 year old male who is being evaluated via a billable video visit.      The patient has been notified of following:     \"This video visit will be conducted via a call between you and your physician/provider. We have found that certain health care needs can be provided without the need for an in-person physical exam.  This service lets us provide the care you need with a video conversation.  If a prescription is necessary we can send it directly to your pharmacy.  If lab work is needed we can place an order for that and you can then stop by our lab to have the test done at a later time.    Video visits are billed at different rates depending on your insurance coverage.  Please reach out to your insurance provider with any questions.    If during the course of the call the physician/provider feels a video visit is not appropriate, you will not be charged for this service.\"    Patient has given verbal consent for Video visit? Yes    How would you like to obtain your AVS? Mail a copy    Patient would like the video invitation sent by: Text to cell phone: [phone]    Will anyone else be joining your video visit? No      Subjective     Bladimir Lezama is a 59 year old male who presents today via video visit for the following health issues:    HPI  Diabetes Follow-up    How often are you checking your blood sugar? A few times a week in AM - BG usually 115-120  What time of day are you checking your blood sugars (select all that apply)?  Before meals  Have you had any blood sugars above 200?  No  Have you had any blood sugars below 70?  No    What symptoms do you notice when your blood sugar is low?  None    What concerns do you have today about your diabetes? None     Do you have any of these symptoms? (Select all that apply)  No numbness or tingling in feet.  No redness, sores or blisters on feet.  No complaints of excessive thirst.  No reports of blurry vision.  No significant changes to " "weight.      BP Readings from Last 2 Encounters:   11/25/19 118/80   08/27/19 102/70     Hemoglobin A1C (%)   Date Value   11/25/2019 5.3   08/27/2019 5.9 (H)     LDL Cholesterol Calculated (mg/dL)   Date Value   11/25/2019 120 (H)   05/22/2019 100 (H)             How many servings of fruits and vegetables do you eat daily?  4 or more    On average, how many sweetened beverages do you drink each day (Examples: soda, juice, sweet tea, etc.  Do NOT count diet or artificially sweetened beverages)?   0    How many days per week do you exercise enough to make your heart beat faster? 7    How many minutes a day do you exercise enough to make your heart beat faster? 30 - 60    How many days per week do you miss taking your medication? 0         Video Start Time: 11:35 AM        Recent Labs   Lab Test 11/25/19  0839 08/27/19  0937 05/22/19  0957 01/13/16  1010   A1C 5.3 5.9* 9.3*  --    *  --  100* 117*   HDL 52  --  38* 40   TRIG 107  --  141 119   ALT  --  45  --   --    CR  --  0.88 0.93 0.95   GFRESTIMATED  --  >90 >90 82   GFRESTBLACK  --  >90 >90 >90  African American GFR Calc     POTASSIUM  --  4.4 4.3 4.1   TSH  --  1.90  --   --       BP Readings from Last 3 Encounters:   11/25/19 118/80   08/27/19 102/70   06/10/19 108/74    Wt Readings from Last 3 Encounters:   06/01/20 113.4 kg (250 lb)   11/25/19 116.1 kg (256 lb)   08/27/19 119.1 kg (262 lb 8 oz)                    Reviewed and updated as needed this visit by Provider  Meds  Problems         Review of Systems   RESP:  NEGATIVE for shortness of breath  CV:  NEGATIVE for chest pain        Objective    Ht 1.854 m (6' 1\")   Wt 113.4 kg (250 lb)   BMI 32.98 kg/m    Estimated body mass index is 32.98 kg/m  as calculated from the following:    Height as of this encounter: 1.854 m (6' 1\").    Weight as of this encounter: 113.4 kg (250 lb).  Physical Exam   GENERAL: Healthy, alert and no distress  EYES: Eyes grossly normal to inspection.  No discharge or " "erythema, or obvious scleral/conjunctival abnormalities.  RESP: No audible wheeze, cough, or visible cyanosis.  No visible retractions or increased work of breathing.    SKIN: Visible skin clear. No significant rash, abnormal pigmentation or lesions.  NEURO: Cranial nerves grossly intact.  Mentation and speech appropriate for age.  PSYCH: Mentation appears normal, affect normal/bright, judgement and insight intact, normal speech and appearance well-groomed.      Diagnostic Test Results:  Labs reviewed in Epic        Assessment & Plan       ICD-10-CM    1. Type 2 diabetes mellitus without complication, without long-term current use of insulin (H)  E11.9 **A1C FUTURE anytime     **Basic metabolic panel FUTURE anytime     Albumin Random Urine Quantitative with Creat Ratio     Lipid panel reflex to direct LDL Fasting        BMI:   Estimated body mass index is 32.98 kg/m  as calculated from the following:    Height as of this encounter: 1.854 m (6' 1\").    Weight as of this encounter: 113.4 kg (250 lb).   Weight management plan: Discussed healthy diet and exercise guidelines        Patient Instructions   Schedule a fasting lab-only appointment.  Fast for 10 hours prior to labs: nothing to eat or drink except plain water and your pills for ten hours prior to appointment.  In addition, avoid fatty foods and alcohol for 24 hours prior to appointment.       Return in about 6 months (around 12/1/2020) for follow-up medical condition(s).    Radha Zhang MD  [facility]      Video-Visit Details    Type of service:  Video Visit    Video End Time:11:47 AM    Originating Location (pt. Location): Home    Distant Location (provider location):  [facility]     Platform used for Video Visit: Abdirahman Zhang MD        "

## 2020-08-20 ENCOUNTER — MYC REFILL (OUTPATIENT)
Dept: FAMILY MEDICINE | Facility: CLINIC | Age: 60
End: 2020-08-20

## 2020-08-20 DIAGNOSIS — E11.8 TYPE 2 DIABETES MELLITUS WITH COMPLICATION, WITHOUT LONG-TERM CURRENT USE OF INSULIN (H): ICD-10-CM

## 2020-08-21 RX ORDER — METFORMIN HCL 500 MG
1000 TABLET, EXTENDED RELEASE 24 HR ORAL
Qty: 180 TABLET | Refills: 0 | Status: SHIPPED | OUTPATIENT
Start: 2020-08-21 | End: 2020-11-15

## 2020-08-21 NOTE — TELEPHONE ENCOUNTER
Prescription approved per [facility] Refill Protocol.  Dori Caba RN

## 2020-08-24 DIAGNOSIS — E11.9 TYPE 2 DIABETES MELLITUS WITHOUT COMPLICATION, WITHOUT LONG-TERM CURRENT USE OF INSULIN (H): ICD-10-CM

## 2020-08-24 LAB
ANION GAP SERPL CALCULATED.3IONS-SCNC: 6 MMOL/L (ref 3–14)
BUN SERPL-MCNC: 20 MG/DL (ref 7–30)
CALCIUM SERPL-MCNC: 9.1 MG/DL (ref 8.5–10.1)
CHLORIDE SERPL-SCNC: 109 MMOL/L (ref 94–109)
CHOLEST SERPL-MCNC: 152 MG/DL
CO2 SERPL-SCNC: 25 MMOL/L (ref 20–32)
CREAT SERPL-MCNC: 0.88 MG/DL (ref 0.66–1.25)
GFR SERPL CREATININE-BSD FRML MDRD: >90 ML/MIN/{1.73_M2}
GLUCOSE SERPL-MCNC: 102 MG/DL (ref 70–99)
HBA1C MFR BLD: 5.5 % (ref 0–5.6)
HDLC SERPL-MCNC: 49 MG/DL
LDLC SERPL CALC-MCNC: 84 MG/DL
NONHDLC SERPL-MCNC: 103 MG/DL
POTASSIUM SERPL-SCNC: 3.8 MMOL/L (ref 3.4–5.3)
SODIUM SERPL-SCNC: 140 MMOL/L (ref 133–144)
TRIGL SERPL-MCNC: 97 MG/DL

## 2020-08-24 PROCEDURE — 80061 LIPID PANEL: CPT | Performed by: FAMILY MEDICINE

## 2020-08-24 PROCEDURE — 82043 UR ALBUMIN QUANTITATIVE: CPT | Performed by: FAMILY MEDICINE

## 2020-08-24 PROCEDURE — 83036 HEMOGLOBIN GLYCOSYLATED A1C: CPT | Performed by: FAMILY MEDICINE

## 2020-08-24 PROCEDURE — 80048 BASIC METABOLIC PNL TOTAL CA: CPT | Performed by: FAMILY MEDICINE

## 2020-08-24 PROCEDURE — 36415 COLL VENOUS BLD VENIPUNCTURE: CPT | Performed by: FAMILY MEDICINE

## 2020-08-24 NOTE — RESULT ENCOUNTER NOTE
Higinio Mr. Lezama,  Your results came back and are within acceptable limits. -A1C (test of diabetes control the last 2-3 months) is at your goal. Please recheck your A1C test in 3 months. . If you have any further concerns please do not hesitate to contact us by message, phone or making an appointment.  Have a good day   Dr Kevin ROBIN in your PCP's absence

## 2020-08-25 LAB
CREAT UR-MCNC: 159 MG/DL
MICROALBUMIN UR-MCNC: 11 MG/L
MICROALBUMIN/CREAT UR: 7.11 MG/G CR (ref 0–17)

## 2020-08-25 NOTE — RESULT ENCOUNTER NOTE
Haydeedelia Mr. Lezama,  Your results came back and are within acceptable limits. -Cholesterol levels are at your goal levels.  ADVISE: continuing your medication, a regular exercise program with at least 150 minutes of aerobic exercise per week, and eating a low saturated fat/low carbohydrate diet.  Also, you should recheck this fasting cholesterol panel in 12 months.  -Kidney function (GFR) is normal.  -Sodium is normal.  -Potassium is normal.  -Calcium is normal.  -Glucose is elevated due to your diabetes.. If you have any further concerns please do not hesitate to contact us by message, phone or making an appointment.  Have a good day   Dr Kevin ROBIN

## 2020-08-29 NOTE — RESULT ENCOUNTER NOTE
Tono Garrison,  This all looks great!  Your lipid panel (cholesterol) results are excellent and your urine albumin (protein) level is normal.  Urine albumin is a test for microscopic proteins in the urine - a sign of early kidney disease from diabetes. Keeping blood sugar controlled helps keep the kidneys healthy.  I also recommend staying hydrated and avoiding frequent use of over-the-counter NSAID medications (ibuprofen, naproxen, advil, motrin, aleve).     Radha Zhang MD

## 2020-11-14 ENCOUNTER — MYC REFILL (OUTPATIENT)
Dept: FAMILY MEDICINE | Facility: CLINIC | Age: 60
End: 2020-11-14

## 2020-11-14 DIAGNOSIS — E11.8 TYPE 2 DIABETES MELLITUS WITH COMPLICATION, WITHOUT LONG-TERM CURRENT USE OF INSULIN (H): ICD-10-CM

## 2020-11-14 RX ORDER — METFORMIN HCL 500 MG
1000 TABLET, EXTENDED RELEASE 24 HR ORAL
Qty: 180 TABLET | Refills: 0 | Status: CANCELLED | OUTPATIENT
Start: 2020-11-14

## 2020-11-15 ENCOUNTER — MYC REFILL (OUTPATIENT)
Dept: FAMILY MEDICINE | Facility: CLINIC | Age: 60
End: 2020-11-15

## 2020-11-15 DIAGNOSIS — E11.8 TYPE 2 DIABETES MELLITUS WITH COMPLICATION, WITHOUT LONG-TERM CURRENT USE OF INSULIN (H): ICD-10-CM

## 2020-11-17 RX ORDER — METFORMIN HCL 500 MG
1000 TABLET, EXTENDED RELEASE 24 HR ORAL
Qty: 180 TABLET | Refills: 0 | Status: SHIPPED | OUTPATIENT
Start: 2020-11-17 | End: 2021-02-17

## 2020-11-17 RX ORDER — METFORMIN HCL 500 MG
TABLET, EXTENDED RELEASE 24 HR ORAL
Qty: 180 TABLET | Refills: 0 | OUTPATIENT
Start: 2020-11-17

## 2020-12-14 ENCOUNTER — HEALTH MAINTENANCE LETTER (OUTPATIENT)
Age: 60
End: 2020-12-14

## 2021-02-12 DIAGNOSIS — E11.8 TYPE 2 DIABETES MELLITUS WITH COMPLICATION, WITHOUT LONG-TERM CURRENT USE OF INSULIN (H): ICD-10-CM

## 2021-02-17 RX ORDER — METFORMIN HCL 500 MG
TABLET, EXTENDED RELEASE 24 HR ORAL
Qty: 180 TABLET | Refills: 0 | Status: SHIPPED | OUTPATIENT
Start: 2021-02-17 | End: 2021-05-06

## 2021-03-30 ENCOUNTER — IMMUNIZATION (OUTPATIENT)
Dept: NURSING | Facility: CLINIC | Age: 61
End: 2021-03-30
Payer: COMMERCIAL

## 2021-03-30 PROCEDURE — 91300 PR COVID VAC PFIZER DIL RECON 30 MCG/0.3 ML IM: CPT

## 2021-03-30 PROCEDURE — 0001A PR COVID VAC PFIZER DIL RECON 30 MCG/0.3 ML IM: CPT

## 2021-04-17 ENCOUNTER — HEALTH MAINTENANCE LETTER (OUTPATIENT)
Age: 61
End: 2021-04-17

## 2021-04-20 ENCOUNTER — IMMUNIZATION (OUTPATIENT)
Dept: NURSING | Facility: CLINIC | Age: 61
End: 2021-04-20
Attending: PSYCHIATRY & NEUROLOGY
Payer: COMMERCIAL

## 2021-04-20 PROCEDURE — 91300 PR COVID VAC PFIZER DIL RECON 30 MCG/0.3 ML IM: CPT

## 2021-04-20 PROCEDURE — 0002A PR COVID VAC PFIZER DIL RECON 30 MCG/0.3 ML IM: CPT

## 2021-05-15 DIAGNOSIS — E11.8 TYPE 2 DIABETES MELLITUS WITH COMPLICATION, WITHOUT LONG-TERM CURRENT USE OF INSULIN (H): ICD-10-CM

## 2021-05-17 RX ORDER — METFORMIN HCL 500 MG
TABLET, EXTENDED RELEASE 24 HR ORAL
Qty: 180 TABLET | OUTPATIENT
Start: 2021-05-17

## 2021-05-17 NOTE — TELEPHONE ENCOUNTER
metFORMIN (GLUCOPHAGE-XR) 500 MG 24 hr tablet.  Message sent to pharmacy - Refusal reason: Patient needs appointment (ORDER SENT 5/6/21 FOR 1 MO SUPPLY TO REQUESTING [facility] [id number].).  Poncho SEPULVEDA        --Last visit:  6/1/2020     --Future Visit:   Next 5 appointments (look out 90 days)    May 25, 2021  8:40 AM  SHORT with Radha Zhang MD  [facility] ([facility] ) [address] [place] [address] [phone]

## 2021-05-23 NOTE — PROGRESS NOTES
Assessment & Plan     Type 2 diabetes mellitus without complication, without long-term current use of insulin (H)  stable/well controlled.  Discussed option of starting statin which he has been hesitant to do.  Given his low lipid levels I suggested that he try low-dose rosuvastatin; he is considering that.  He will be going back to [place] soon and will have eye exam there.   - Hemoglobin A1c  - JUST IN CASE  - FOOT EXAM  - rosuvastatin (CRESTOR) 5 MG tablet  Dispense: 30 tablet; Refill: 1  - Lipid panel reflex to direct LDL Fasting  - **ALT FUTURE anytime  - metFORMIN (GLUCOPHAGE-XR) 500 MG 24 hr tablet  Dispense: 180 tablet; Refill: 1      Need for vaccination  I recommended Shingrix and Hepatitis B vaccines.  He'd like to defer for now but plans on starting Shingrix soon.      See Patient Instructions    No follow-ups on file.    Radha Zhang MD  [facility]        Clinton Garrison is a 60 year old who presents for the following health issues     HPI     Diabetes Follow-up  He continues to take metformin with no problems or noted side effects.   How often are you checking your blood sugar? A few times a week  What time of day are you checking your blood sugars (select all that apply)?  Before meals  Have you had any blood sugars above 200?  No  Have you had any blood sugars below 70?  No    What symptoms do you notice when your blood sugar is low?  None    What concerns do you have today about your diabetes? None     Do you have any of these symptoms? (Select all that apply)  No numbness or tingling in feet.  No redness, sores or blisters on feet.  No complaints of excessive thirst.  No reports of blurry vision.  No significant changes to weight.    Have you had a diabetic eye exam in the last 12 months? No        BP Readings from Last 2 Encounters:   05/25/21 108/82   11/25/19 118/80     Hemoglobin A1C (%)   Date Value   05/25/2021 5.7 (H)   08/24/2020 5.5     LDL  "Cholesterol Calculated (mg/dL)   Date Value   08/24/2020 84   11/25/2019 120 (H)         How many servings of fruits and vegetables do you eat daily?  2-3    On average, how many sweetened beverages do you drink each day (Examples: soda, juice, sweet tea, etc.  Do NOT count diet or artificially sweetened beverages)?   0    How many days per week do you exercise enough to make your heart beat faster? 5    How many minutes a day do you exercise enough to make your heart beat faster? 20 - 29  4 miles a day    How many days per week do you miss taking your medication? 0        Review of Systems   RESP:  NEGATIVE for shortness of breath  CV:  NEGATIVE for chest pain        Objective    /82 (BP Location: Left arm, Patient Position: Chair, Cuff Size: Adult Large)   Pulse 75   Temp 97.2  F (36.2  C) (Tympanic)   Resp 14   Ht 1.854 m (6' 1\")   Wt 115.2 kg (254 lb)   SpO2 97%   BMI 33.51 kg/m    Body mass index is 33.51 kg/m .  Physical Exam   GENERAL: healthy, alert and no distress  HENT: ear canals and TM's normal  RESP: lungs clear to auscultation - no rales, rhonchi or wheezes  CV: regular rate and rhythm, normal S1 S2, no S3 or S4, no murmur, click or rub, no peripheral edema  MS: no gross musculoskeletal defects noted, no edema  DIABETIC FOOT EXAM:  Bilateral feet examined.  No lesions or deformities noted.  Skin is warm and dry.  Pedal pulses are intact.  Sensation is intact to nylon filament exam.            "

## 2021-05-23 NOTE — PATIENT INSTRUCTIONS
If you decide to start rosuvastatin you could even start at a half tablet once daily.  After you've been on it for 6-8 weeks schedule a fasting lab-only appointment.        Did you know?   I do telehealth (video) visits exclusively on Wednesdays.  I still do in-person visits at [facility] ([phone]) on Mondays, Tuesdays and Thursdays.  You can schedule a video visit for follow-up appointments as well as future appointments for certain conditions.  Please see the below link.  [email or web address]    If you have not already done so,  I encourage you to sign up for [email or web address] ([email or web address]).  This will allow you to review your results, securely communicate with your provider care team, and schedule virtual visits as well.    To schedule any ordered imaging studies you can call [facility] at [phone].  If you are scheduling a DEXA (bone density) scan please do NOT schedule this at the [facility] [place].  Please ask that it be done at [facility] in [place].  Other preferred DEXA locations include [place] (at the [facility]), [place], or [place].      I recommend that patients 50 and over get the Shingrix vaccine at a pharmacy.  The pharmacist will let you know beforehand if there is a copay and can administer the vaccine.  The Shingrix vaccination is a 2-shot series.  The second dose is given 6 months after the first.  (It cannot be given sooner than 2 months after the first dose - at the earliest.)  You should be aware that patients are reporting feeling a bit under the weather after the injection, including fatigue, malaise, and low-grade fever that may last a couple days.  Rarely patients can get a mild, shingles-like rash.   But these symptoms are much better than the Shingles disease.

## 2021-05-25 ENCOUNTER — OFFICE VISIT (OUTPATIENT)
Dept: FAMILY MEDICINE | Facility: CLINIC | Age: 61
End: 2021-05-25
Payer: COMMERCIAL

## 2021-05-25 VITALS
HEIGHT: 73 IN | RESPIRATION RATE: 14 BRPM | DIASTOLIC BLOOD PRESSURE: 82 MMHG | BODY MASS INDEX: 33.66 KG/M2 | SYSTOLIC BLOOD PRESSURE: 108 MMHG | OXYGEN SATURATION: 97 % | TEMPERATURE: 97.2 F | HEART RATE: 75 BPM | WEIGHT: 254 LBS

## 2021-05-25 DIAGNOSIS — Z23 NEED FOR VACCINATION: ICD-10-CM

## 2021-05-25 DIAGNOSIS — E11.9 TYPE 2 DIABETES MELLITUS WITHOUT COMPLICATION, WITHOUT LONG-TERM CURRENT USE OF INSULIN (H): Primary | ICD-10-CM

## 2021-05-25 PROBLEM — E66.01 SEVERE OBESITY (BMI 35.0-35.9 WITH COMORBIDITY) (H): Status: RESOLVED | Noted: 2018-06-25 | Resolved: 2021-05-25

## 2021-05-25 LAB — HBA1C MFR BLD: 5.7 % (ref 0–5.6)

## 2021-05-25 PROCEDURE — 99207 PR FOOT EXAM NO CHARGE: CPT | Performed by: FAMILY MEDICINE

## 2021-05-25 PROCEDURE — 83036 HEMOGLOBIN GLYCOSYLATED A1C: CPT | Performed by: FAMILY MEDICINE

## 2021-05-25 PROCEDURE — 99213 OFFICE O/P EST LOW 20 MIN: CPT | Performed by: FAMILY MEDICINE

## 2021-05-25 PROCEDURE — 36415 COLL VENOUS BLD VENIPUNCTURE: CPT | Performed by: FAMILY MEDICINE

## 2021-05-25 RX ORDER — ROSUVASTATIN CALCIUM 5 MG/1
5 TABLET, COATED ORAL DAILY
Qty: 30 TABLET | Refills: 1 | Status: SHIPPED | OUTPATIENT
Start: 2021-05-25 | End: 2021-12-21

## 2021-05-25 RX ORDER — METFORMIN HCL 500 MG
TABLET, EXTENDED RELEASE 24 HR ORAL
Qty: 180 TABLET | Refills: 1 | Status: SHIPPED | OUTPATIENT
Start: 2021-05-25 | End: 2021-11-23

## 2021-05-25 ASSESSMENT — MIFFLIN-ST. JEOR: SCORE: 2021.02

## 2021-06-26 ENCOUNTER — TRANSFERRED RECORDS (OUTPATIENT)
Dept: HEALTH INFORMATION MANAGEMENT | Facility: CLINIC | Age: 61
End: 2021-06-26

## 2021-06-26 LAB — RETINOPATHY: NEGATIVE

## 2021-10-02 ENCOUNTER — HEALTH MAINTENANCE LETTER (OUTPATIENT)
Age: 61
End: 2021-10-02

## 2021-10-29 ENCOUNTER — IMMUNIZATION (OUTPATIENT)
Dept: NURSING | Facility: CLINIC | Age: 61
End: 2021-10-29
Payer: COMMERCIAL

## 2021-10-29 PROCEDURE — 91300 PR COVID VAC PFIZER DIL RECON 30 MCG/0.3 ML IM: CPT

## 2021-10-29 PROCEDURE — 0004A PR COVID VAC PFIZER DIL RECON 30 MCG/0.3 ML IM: CPT

## 2021-11-01 ENCOUNTER — TRANSFERRED RECORDS (OUTPATIENT)
Dept: HEALTH INFORMATION MANAGEMENT | Facility: CLINIC | Age: 61
End: 2021-11-01

## 2021-11-01 LAB — RETINOPATHY: NORMAL

## 2021-11-12 ENCOUNTER — MYC MEDICAL ADVICE (OUTPATIENT)
Dept: FAMILY MEDICINE | Facility: CLINIC | Age: 61
End: 2021-11-12
Payer: COMMERCIAL

## 2021-11-12 DIAGNOSIS — Z12.11 SCREEN FOR COLON CANCER: Primary | ICD-10-CM

## 2021-11-13 ENCOUNTER — MYC MEDICAL ADVICE (OUTPATIENT)
Dept: FAMILY MEDICINE | Facility: CLINIC | Age: 61
End: 2021-11-13
Payer: COMMERCIAL

## 2021-11-27 ENCOUNTER — HEALTH MAINTENANCE LETTER (OUTPATIENT)
Age: 61
End: 2021-11-27

## 2021-12-14 ENCOUNTER — LAB (OUTPATIENT)
Dept: LAB | Facility: CLINIC | Age: 61
End: 2021-12-14
Payer: COMMERCIAL

## 2021-12-14 DIAGNOSIS — E11.9 TYPE 2 DIABETES MELLITUS WITHOUT COMPLICATION, WITHOUT LONG-TERM CURRENT USE OF INSULIN (H): ICD-10-CM

## 2021-12-14 PROCEDURE — 80061 LIPID PANEL: CPT

## 2021-12-14 PROCEDURE — 84460 ALANINE AMINO (ALT) (SGPT): CPT

## 2021-12-14 PROCEDURE — 36415 COLL VENOUS BLD VENIPUNCTURE: CPT

## 2021-12-15 LAB — ALT SERPL W P-5'-P-CCNC: 52 U/L (ref 0–70)

## 2021-12-16 LAB — HBA1C MFR BLD: 6.1 % (ref 0–5.6)

## 2021-12-16 PROCEDURE — 36415 COLL VENOUS BLD VENIPUNCTURE: CPT

## 2021-12-16 PROCEDURE — 83036 HEMOGLOBIN GLYCOSYLATED A1C: CPT

## 2021-12-18 ENCOUNTER — MYC MEDICAL ADVICE (OUTPATIENT)
Dept: FAMILY MEDICINE | Facility: CLINIC | Age: 61
End: 2021-12-18
Payer: COMMERCIAL

## 2021-12-18 DIAGNOSIS — E11.9 TYPE 2 DIABETES MELLITUS WITHOUT COMPLICATION, WITHOUT LONG-TERM CURRENT USE OF INSULIN (H): ICD-10-CM

## 2021-12-18 DIAGNOSIS — Z51.81 MEDICATION MONITORING ENCOUNTER: Primary | ICD-10-CM

## 2021-12-18 LAB
CHOLEST SERPL-MCNC: 191 MG/DL
FASTING STATUS PATIENT QL REPORTED: YES
HDLC SERPL-MCNC: 51 MG/DL
LDLC SERPL CALC-MCNC: 119 MG/DL
NONHDLC SERPL-MCNC: 140 MG/DL
TRIGL SERPL-MCNC: 105 MG/DL

## 2021-12-20 NOTE — TELEPHONE ENCOUNTER
Dr. Zhang -  Patient asks if he should start a statin based on lipid panel results.    Lab Results   Component Value Date    CHOL 191 12/14/2021    CHOL 152 08/24/2020     Lab Results   Component Value Date    HDL 51 12/14/2021    HDL 49 08/24/2020     Lab Results   Component Value Date     12/14/2021    LDL 84 08/24/2020     Lab Results   Component Value Date    TRIG 105 12/14/2021    TRIG 97 08/24/2020     Lab Results   Component Value Date    CHOLHDLRATIO 3.2 09/16/2014       Gloria Kaiser RN  [facility]

## 2021-12-21 RX ORDER — METFORMIN HCL 500 MG
TABLET, EXTENDED RELEASE 24 HR ORAL
Qty: 180 TABLET | Refills: 1 | Status: SHIPPED | OUTPATIENT
Start: 2021-12-21 | End: 2022-05-27

## 2021-12-21 RX ORDER — ROSUVASTATIN CALCIUM 5 MG/1
5 TABLET, COATED ORAL DAILY
Qty: 90 TABLET | Refills: 0 | Status: SHIPPED | OUTPATIENT
Start: 2021-12-21 | End: 2022-03-25

## 2021-12-21 NOTE — RESULT ENCOUNTER NOTE
Tono Garrison,  I suspect you are not taking the rosuvastatin as your cholesterol levels have gone up (not down) since we last checked them.  I do recommend a statin (cholesterol-lowering medication that can help reduce your risk for cardiovascular disease).  Let me know if you'd like a prescription for that.  If you start it I do recommend that you plan to continue it indefinitely as going on and off of it is not recommended.        Then, as I recommended last time, we should recheck your lipid panel and ALT (liver test) after you've been on it for 2 months.  (There was no point in checking the ALT now if you hadn't been taking it.)   Radha Zhang MD

## 2021-12-24 ENCOUNTER — TELEPHONE (OUTPATIENT)
Dept: GASTROENTEROLOGY | Facility: CLINIC | Age: 61
End: 2021-12-24
Payer: COMMERCIAL

## 2021-12-24 DIAGNOSIS — Z11.59 ENCOUNTER FOR SCREENING FOR OTHER VIRAL DISEASES: ICD-10-CM

## 2021-12-24 NOTE — TELEPHONE ENCOUNTER
Screening Questions  1. Are you active on mychart? YES    2. What insurance is in the chart? [employer]    2.  Ordering/Referring Provider: Radha Zhang MD    3. BMI 36.1, If greater than 40 review exclusion criteria also will need EXTENDED PREP    4.  Respiratory Screening (If yes to any of the following Hospital setting only):     Do you use daily home oxygen? NO  Do you have mod to severe Obstructive Sleep Apnea? NO   Do you have Pulmonary Hypertension? NO   Do you have UNCONTROLLED asthma? NO    5. Have you had a heart or lung transplant (If yes, please review exclusion criteria) ? NO    6. Are you currently on dialysis or have chronic kidney disease? NO    7. Have you had a stroke or Transient ischemic attack (TIA) within 6 months? NO    8. In the past 6 months, have you had any heart related issues including cardiomyopathy or heart attack? NO                 If yes, did it require cardiac stenting or other implantable device?NO      9. Do you have any implantable devices in your body (pacemaker, defib, LVAD)? NO    10. Do you take nitroglycerin? If yes, how often? NO    11. Are you currently taking any blood thinners?NO    12. Are you a diabetic? YES    13. (Females) Are you currently pregnant? N/A  If yes, how many weeks?      15. Are you taking any prescription pain medications on a routine schedule? NO  If yes, MAC sedation and patient will need EXTENDED PREP.    16. Do you have any chemical dependencies such as alcohol, street drugs, or methadone? NOIf yes, MAC sedation.    17. Do you have any history of post-traumatic stress syndrome, severe anxiety or history of psychosis? NO    18. Do you transfer independently? YES    19.  Do you have any issues with constipation? NO   If yes, pt will need EXTENDED PREP     20. Preferred Pharmacy for Pre Prescription [facility] OR [facility]    Scheduling Details    Which Colonoscopy Prep was Sent?: [facility]  Type of Procedure Scheduled: COLON  Surgeon:  DONNA  Date of  Procedure: 1/13/2022  Location:   Caller (Please ask for phone number if not scheduled by patient): LIANNA      Sedation Type: CS  Conscious Sedation- Needs  for 6 hours after the procedure  MAC/General-Needs  for 24 hours after procedure    Pre-op Required at [facility] [place], [place] and OR for MAC sedation: NO  (if yes advise patient they will need a pre-op prior to procedure)      Is patient on blood thinners? -NO (If yes- inform patient to follow up with PCP or provider for follow up instructions)     Informed patient they will need an adult  YES  Cannot take any type of public or medical transportation alone    Pre-Procedure Covid test to be completed at [facility] or Externally: YE    Confirmed Nurse will call to complete assessment YES    Additional comments:

## 2022-01-07 ASSESSMENT — ENCOUNTER SYMPTOMS
WEAKNESS: 0
COUGH: 0
HEARTBURN: 0
SORE THROAT: 0
FEVER: 0
NAUSEA: 0
DYSURIA: 0
JOINT SWELLING: 0
HEMATURIA: 0
SHORTNESS OF BREATH: 0
MYALGIAS: 0
HEADACHES: 0
NERVOUS/ANXIOUS: 0
PARESTHESIAS: 0
CONSTIPATION: 0
DIARRHEA: 0
HEMATOCHEZIA: 0
ARTHRALGIAS: 0
ABDOMINAL PAIN: 0
EYE PAIN: 0
FREQUENCY: 0
DIZZINESS: 0
PALPITATIONS: 0
CHILLS: 0

## 2022-01-09 ASSESSMENT — ENCOUNTER SYMPTOMS
CHILLS: 0
HEADACHES: 0
CONSTIPATION: 0
ARTHRALGIAS: 0
HEMATURIA: 0
JOINT SWELLING: 0
NAUSEA: 0
ABDOMINAL PAIN: 0
WEAKNESS: 0
NERVOUS/ANXIOUS: 0
FREQUENCY: 0
EYE PAIN: 0
COUGH: 0
HEMATOCHEZIA: 0
SORE THROAT: 0
PALPITATIONS: 0
DIARRHEA: 0
PARESTHESIAS: 0
HEARTBURN: 0
MYALGIAS: 0
SHORTNESS OF BREATH: 0
FEVER: 0
DYSURIA: 0
DIZZINESS: 0

## 2022-01-09 NOTE — PROGRESS NOTES
SUBJECTIVE:   CC: Bladimir Lezama is an 61 year old male who presents for preventative health visit.       Patient has been advised of split billing requirements and indicates understanding: Yes     Healthy Habits:     Getting at least 3 servings of Calcium per day:  Yes    Bi-annual eye exam:  Yes    Dental care twice a year:  Yes    Sleep apnea or symptoms of sleep apnea:  None    Diet:  Regular (no restrictions)    Frequency of exercise:  2-3 days/week    Duration of exercise:  45-60 minutes    Taking medications regularly:  Yes    Barriers to taking medications:  None    Medication side effects:  None    PHQ-2 Total Score: 0    Additional concerns today:  No      Today's PHQ-2 Score:   PHQ-2 ( 1999 Pfizer) 1/7/2022   Q1: Little interest or pleasure in doing things 0   Q2: Feeling down, depressed or hopeless 0   PHQ-2 Score 0   PHQ-2 Total Score (12-17 Years)- Positive if 3 or more points; Administer PHQ-A if positive -   Q1: Little interest or pleasure in doing things Not at all   Q2: Feeling down, depressed or hopeless Not at all   PHQ-2 Score 0       Abuse: Current or Past(Physical, Sexual or Emotional)- No  Do you feel safe in your environment? Yes    Have you ever done Advance Care Planning? (For example, a Health Directive, POLST, or a discussion with a medical provider or your loved ones about your wishes): No, advance care planning information given to patient to review.  Patient declined advance care planning discussion at this time.    Social History     Tobacco Use     Smoking status: Never Smoker     Smokeless tobacco: Never Used   Substance Use Topics     Alcohol use: Yes     Comment: 1 glass of wine every few days     If you drink alcohol do you typically have >3 drinks per day or >7 drinks per week? No      Alcohol Use 1/11/2022   Prescreen: >3 drinks/day or >7 drinks/week? -   Prescreen: >3 drinks/day or >7 drinks/week? Yes       Last PSA:   PSA   Date Value Ref Range Status   06/30/2011 1.14 0 - 4  ug/L Final     Prostate Specific Antigen Screen   Date Value Ref Range Status   01/11/2022 1.46 0.00 - 4.00 ug/L Final       Reviewed orders with patient. Reviewed health maintenance and updated orders accordingly - Yes  BP Readings from Last 3 Encounters:   01/11/22 116/70   05/25/21 108/82   11/25/19 118/80    Wt Readings from Last 3 Encounters:   01/11/22 117 kg (258 lb)   05/25/21 115.2 kg (254 lb)   06/01/20 113.4 kg (250 lb)                    Reviewed and updated as needed this visit by clinical staff  Tobacco  Allergies  Meds   Med Hx  Surg Hx  Fam Hx  Soc Hx       Reviewed and updated as needed this visit by Provider               Past Medical History:   Diagnosis Date     Impaired fasting glucose 9/17/2014    Glucose 101 9/16/2014       Impaired fasting glucose 9/17/2014    Glucose 101 9/16/2014        Obesity 7/13/2012     Perennial allergic rhinitis      Skin tag 9/16/2014     Type 2 diabetes mellitus (H) 6/10/2019      Past Surgical History:   Procedure Laterality Date     COLONOSCOPY  9/2011    2 hyperplastic polyps - no adenomas     TONSILLECTOMY  1970     Z APPENDECTOMY  1974       Review of Systems   Constitutional: Negative for chills and fever.   HENT: Negative for congestion, ear pain, hearing loss and sore throat.    Eyes: Negative for pain and visual disturbance.   Respiratory: Negative for cough and shortness of breath.    Cardiovascular: Negative for chest pain, palpitations and peripheral edema.   Gastrointestinal: Negative for abdominal pain, constipation, diarrhea, heartburn, hematochezia and nausea.   Genitourinary: Positive for impotence. Negative for dysuria, frequency, genital sores, hematuria, penile discharge and urgency.   Musculoskeletal: Negative for arthralgias, joint swelling and myalgias.   Skin: Negative for rash.   Neurological: Negative for dizziness, weakness, headaches and paresthesias.   Psychiatric/Behavioral: Negative for mood changes. The patient is not  "nervous/anxious.          OBJECTIVE:   /70 (BP Location: Left arm, Patient Position: Chair, Cuff Size: Adult Regular)   Pulse 98   Temp 98.2  F (36.8  C) (Temporal)   Ht 1.84 m (6' 0.44\")   Wt 117 kg (258 lb)   SpO2 97%   BMI 34.57 kg/m      Physical Exam  GENERAL: healthy, alert and no distress  EYES: Eyes grossly normal to inspection, conjunctivae and sclerae normal  HENT: ear canals and TM's normal  NECK: no adenopathy, no asymmetry, masses, or scars and thyroid normal to palpation  RESP: lungs clear to auscultation - no rales, rhonchi or wheezes  CV: regular rate and rhythm, normal S1 S2, no S3 or S4, no murmur, click or rub, no peripheral edema  ABDOMEN: soft, nontender, no hepatosplenomegaly, no masses  MS: no gross musculoskeletal defects noted, no edema  SKIN: no suspicious lesions or rashes  NEURO: Normal strength and tone, mentation intact and speech normal  PSYCH: mentation appears normal, affect normal/bright       ASSESSMENT/PLAN:     Routine general medical examination at a health care facility      Screening for prostate cancer  - PSA, screen      Type 2 diabetes mellitus without complication, without long-term current use of insulin (H)  He did start rosuvastatin and is tolerating that well.  We reviewed plan to return to clinic for fasting lab-only appointment in 2 months.    - Basic metabolic panel  (Ca, Cl, CO2, Creat, Gluc, K, Na, BUN)  - Albumin Random Urine Quantitative with Creat Ratio    Perennial allergic rhinitis  He uses albuterol very rarely - last filled in 2016  - albuterol (PROAIR HFA/PROVENTIL HFA/VENTOLIN HFA) 108 (90 Base) MCG/ACT inhaler  Dispense: 18 g; Refill: 0    Need for prophylactic vaccination and inoculation against influenza  - INFLUENZA QUAD, RECOMBINANT, P-FREE (RIV4) (FLUBLOK)    Need for vaccination  Hep B #1  - HEPATITIS B VACCINE, ADULT, IM         COUNSELING:   Reviewed preventive health counseling, as reflected in patient instructions    Estimated body " "mass index is 34.57 kg/m  as calculated from the following:    Height as of this encounter: 1.84 m (6' 0.44\").    Weight as of this encounter: 117 kg (258 lb).  Wt Readings from Last 3 Encounters:   01/11/22 117 kg (258 lb)   05/25/21 115.2 kg (254 lb)   06/01/20 113.4 kg (250 lb)   Weight management plan: Discussed healthy diet and exercise guidelines    He reports that he has never smoked. He has never used smokeless tobacco.      Counseling Resources:  ATP IV Guidelines  Pooled Cohorts Equation Calculator  FRAX Risk Assessment  ICSI Preventive Guidelines  Dietary Guidelines for Americans, 2010  USDA's MyPlate  ASA Prophylaxis  Lung CA Screening    Radha Zhang MD  [facility]  "

## 2022-01-09 NOTE — PATIENT INSTRUCTIONS
I kindly request that you check your MyChart prior to all appointments with me and complete any assigned questionnaires ahead of time.      FYI:  I do virtual (video) visits exclusively on Wednesdays.  I still do in-person visits at [facility] ([phone]) on Mondays, Tuesdays and Thursdays.  You can schedule a video visit for many conditions.  Please follow this link:  [email or web address]    To schedule any ordered imaging studies you can call [facility] at [phone].     I recommend that patients 50 and over get the Shingrix vaccine at a pharmacy.  The pharmacist will let you know beforehand if there is a copay and can administer the vaccine.  The Shingrix vaccination is a 2-shot series.  The second dose is given 6 months after the first.  (It cannot be given sooner than 2 months after the first dose - at the earliest.)  You should be aware that patients are reporting feeling a bit under the weather after the injection, including fatigue, malaise, and low-grade fever that may last a couple days.  Rarely patients can get a mild, shingles-like rash.   But these symptoms are much better than the Shingles disease.     Preventive Health Recommendations  Male Ages 50 - 64    Yearly exam:             See your health care provider every year in order to  o   Review health changes.   o   Discuss preventive care.    o   Review your medicines if your doctor has prescribed any.     Have a cholesterol test every 5 years, or more frequently if you are at risk for high cholesterol/heart disease.     Have a diabetes test (fasting glucose) every three years. If you are at risk for diabetes, you should have this test more often.     Have a colonoscopy at age 50, or have a yearly FIT test (stool test). These exams will check for colon cancer.      Talk with your health care provider about whether or not a prostate cancer screening test (PSA) is right  for you.    You should be tested each year for STDs (sexually transmitted diseases), if you re at risk.     Shots: Get a flu shot each year. Get a tetanus shot every 10 years.     Nutrition:    Eat at least 5 servings of fruits and vegetables daily.     Eat whole-grain bread, whole-wheat pasta and brown rice instead of white grains and rice.     Get adequate Calcium and Vitamin D.     Lifestyle    Exercise for at least 150 minutes a week (30 minutes a day, 5 days a week). This will help you control your weight and prevent disease.     Limit alcohol to one drink per day.     No smoking.     Wear sunscreen to prevent skin cancer.     See your dentist every six months for an exam and cleaning.     See your eye doctor every 1 to 2 years.

## 2022-01-10 ENCOUNTER — LAB (OUTPATIENT)
Dept: URGENT CARE | Facility: URGENT CARE | Age: 62
End: 2022-01-10
Payer: COMMERCIAL

## 2022-01-10 DIAGNOSIS — Z11.59 ENCOUNTER FOR SCREENING FOR OTHER VIRAL DISEASES: ICD-10-CM

## 2022-01-10 LAB — SARS-COV-2 RNA RESP QL NAA+PROBE: NEGATIVE

## 2022-01-10 PROCEDURE — U0003 INFECTIOUS AGENT DETECTION BY NUCLEIC ACID (DNA OR RNA); SEVERE ACUTE RESPIRATORY SYNDROME CORONAVIRUS 2 (SARS-COV-2) (CORONAVIRUS DISEASE [COVID-19]), AMPLIFIED PROBE TECHNIQUE, MAKING USE OF HIGH THROUGHPUT TECHNOLOGIES AS DESCRIBED BY CMS-2020-01-R: HCPCS

## 2022-01-10 PROCEDURE — U0005 INFEC AGEN DETEC AMPLI PROBE: HCPCS

## 2022-01-11 ENCOUNTER — OFFICE VISIT (OUTPATIENT)
Dept: FAMILY MEDICINE | Facility: CLINIC | Age: 62
End: 2022-01-11
Payer: COMMERCIAL

## 2022-01-11 VITALS
DIASTOLIC BLOOD PRESSURE: 70 MMHG | SYSTOLIC BLOOD PRESSURE: 116 MMHG | TEMPERATURE: 98.2 F | HEIGHT: 72 IN | WEIGHT: 258 LBS | BODY MASS INDEX: 34.95 KG/M2 | HEART RATE: 98 BPM | OXYGEN SATURATION: 97 %

## 2022-01-11 DIAGNOSIS — Z12.5 SCREENING FOR PROSTATE CANCER: ICD-10-CM

## 2022-01-11 DIAGNOSIS — Z00.00 ROUTINE GENERAL MEDICAL EXAMINATION AT A HEALTH CARE FACILITY: Primary | ICD-10-CM

## 2022-01-11 DIAGNOSIS — J30.89 PERENNIAL ALLERGIC RHINITIS: ICD-10-CM

## 2022-01-11 DIAGNOSIS — E11.9 TYPE 2 DIABETES MELLITUS WITHOUT COMPLICATION, WITHOUT LONG-TERM CURRENT USE OF INSULIN (H): ICD-10-CM

## 2022-01-11 DIAGNOSIS — Z23 NEED FOR VACCINATION: ICD-10-CM

## 2022-01-11 DIAGNOSIS — Z23 NEED FOR PROPHYLACTIC VACCINATION AND INOCULATION AGAINST INFLUENZA: ICD-10-CM

## 2022-01-11 LAB
ANION GAP SERPL CALCULATED.3IONS-SCNC: 7 MMOL/L (ref 3–14)
BUN SERPL-MCNC: 17 MG/DL (ref 7–30)
CALCIUM SERPL-MCNC: 9.2 MG/DL (ref 8.5–10.1)
CHLORIDE BLD-SCNC: 105 MMOL/L (ref 94–109)
CO2 SERPL-SCNC: 25 MMOL/L (ref 20–32)
CREAT SERPL-MCNC: 0.92 MG/DL (ref 0.66–1.25)
GFR SERPL CREATININE-BSD FRML MDRD: >90 ML/MIN/1.73M2
GLUCOSE BLD-MCNC: 172 MG/DL (ref 70–99)
POTASSIUM BLD-SCNC: 3.6 MMOL/L (ref 3.4–5.3)
PSA SERPL-MCNC: 1.46 UG/L (ref 0–4)
SODIUM SERPL-SCNC: 137 MMOL/L (ref 133–144)

## 2022-01-11 PROCEDURE — 90682 RIV4 VACC RECOMBINANT DNA IM: CPT | Performed by: FAMILY MEDICINE

## 2022-01-11 PROCEDURE — 90471 IMMUNIZATION ADMIN: CPT | Performed by: FAMILY MEDICINE

## 2022-01-11 PROCEDURE — G0103 PSA SCREENING: HCPCS | Performed by: FAMILY MEDICINE

## 2022-01-11 PROCEDURE — 90472 IMMUNIZATION ADMIN EACH ADD: CPT | Performed by: FAMILY MEDICINE

## 2022-01-11 PROCEDURE — 90746 HEPB VACCINE 3 DOSE ADULT IM: CPT | Performed by: FAMILY MEDICINE

## 2022-01-11 PROCEDURE — 99213 OFFICE O/P EST LOW 20 MIN: CPT | Mod: 25 | Performed by: FAMILY MEDICINE

## 2022-01-11 PROCEDURE — 99396 PREV VISIT EST AGE 40-64: CPT | Mod: 25 | Performed by: FAMILY MEDICINE

## 2022-01-11 PROCEDURE — 36415 COLL VENOUS BLD VENIPUNCTURE: CPT | Performed by: FAMILY MEDICINE

## 2022-01-11 PROCEDURE — 82043 UR ALBUMIN QUANTITATIVE: CPT | Performed by: FAMILY MEDICINE

## 2022-01-11 PROCEDURE — 80048 BASIC METABOLIC PNL TOTAL CA: CPT | Performed by: FAMILY MEDICINE

## 2022-01-11 RX ORDER — ALBUTEROL SULFATE 90 UG/1
2 AEROSOL, METERED RESPIRATORY (INHALATION) EVERY 6 HOURS PRN
Qty: 18 G | Refills: 0 | Status: SHIPPED | OUTPATIENT
Start: 2022-01-11 | End: 2022-02-03

## 2022-01-11 ASSESSMENT — MIFFLIN-ST. JEOR: SCORE: 2025.28

## 2022-01-11 NOTE — NURSING NOTE
Prior to immunization administration, verified patients identity using patient s name and date of birth. Please see Immunization Activity for additional information.     Screening Questionnaire for Adult Immunization    Are you sick today?   No   Do you have allergies to medications, food, a vaccine component or latex?   No   Have you ever had a serious reaction after receiving a vaccination?   No   Do you have a long-term health problem with heart, lung, kidney, or metabolic disease (e.g., diabetes), asthma, a blood disorder, no spleen, complement component deficiency, a cochlear implant, or a spinal fluid leak?  Are you on long-term aspirin therapy?   No   Do you have cancer, leukemia, HIV/AIDS, or any other immune system problem?   No   Do you have a parent, brother, or sister with an immune system problem?   No   In the past 3 months, have you taken medications that affect  your immune system, such as prednisone, other steroids, or anticancer drugs; drugs for the treatment of rheumatoid arthritis, Crohn s disease, or psoriasis; or have you had radiation treatments?   No   Have you had a seizure, or a brain or other nervous system problem?   No   During the past year, have you received a transfusion of blood or blood    products, or been given immune (gamma) globulin or antiviral drug?   No   For women: Are you pregnant or is there a chance you could become       pregnant during the next month?   No   Have you received any vaccinations in the past 4 weeks?   No     Immunization questionnaire answers were all negative.        Per orders of Dr. Zhang, injection of Flu and Hep- B given by Jose David Armas MA. Patient instructed to remain in clinic for 15 minutes afterwards, and to report any adverse reaction to me immediately.       Screening performed by Jose David Armas MA on 1/11/2022 at 9:01 AM.

## 2022-01-12 LAB
CREAT UR-MCNC: 136 MG/DL
MICROALBUMIN UR-MCNC: 9 MG/L
MICROALBUMIN/CREAT UR: 6.62 MG/G CR (ref 0–17)

## 2022-01-13 ENCOUNTER — HOSPITAL ENCOUNTER (OUTPATIENT)
Facility: CLINIC | Age: 62
Discharge: HOME | End: 2022-01-13
Attending: INTERNAL MEDICINE | Admitting: INTERNAL MEDICINE
Payer: COMMERCIAL

## 2022-01-13 VITALS
DIASTOLIC BLOOD PRESSURE: 78 MMHG | RESPIRATION RATE: 12 BRPM | OXYGEN SATURATION: 96 % | HEART RATE: 83 BPM | SYSTOLIC BLOOD PRESSURE: 106 MMHG

## 2022-01-13 LAB — COLONOSCOPY: NORMAL

## 2022-01-13 PROCEDURE — 88305 TISSUE EXAM BY PATHOLOGIST: CPT | Mod: TC | Performed by: INTERNAL MEDICINE

## 2022-01-13 PROCEDURE — 250N000011 HC RX IP 250 OP 636: Performed by: INTERNAL MEDICINE

## 2022-01-13 PROCEDURE — 45380 COLONOSCOPY AND BIOPSY: CPT | Performed by: INTERNAL MEDICINE

## 2022-01-13 PROCEDURE — 999N000099 HC STATISTIC MODERATE SEDATION < 10 MIN: Performed by: INTERNAL MEDICINE

## 2022-01-13 PROCEDURE — G0500 MOD SEDAT ENDO SERVICE >5YRS: HCPCS | Performed by: INTERNAL MEDICINE

## 2022-01-13 RX ORDER — FENTANYL CITRATE 50 UG/ML
INJECTION, SOLUTION INTRAMUSCULAR; INTRAVENOUS PRN
Status: COMPLETED | OUTPATIENT
Start: 2022-01-13 | End: 2022-01-13

## 2022-01-13 RX ADMIN — FENTANYL CITRATE 100 MCG: 50 INJECTION, SOLUTION INTRAMUSCULAR; INTRAVENOUS at 10:33

## 2022-01-13 RX ADMIN — MIDAZOLAM 2 MG: 1 INJECTION INTRAMUSCULAR; INTRAVENOUS at 10:35

## 2022-01-13 RX ADMIN — MIDAZOLAM 2 MG: 1 INJECTION INTRAMUSCULAR; INTRAVENOUS at 10:37

## 2022-01-17 LAB
PATH REPORT.COMMENTS IMP SPEC: NORMAL
PATH REPORT.COMMENTS IMP SPEC: NORMAL
PATH REPORT.FINAL DX SPEC: NORMAL
PATH REPORT.GROSS SPEC: NORMAL
PATH REPORT.MICROSCOPIC SPEC OTHER STN: NORMAL
PHOTO IMAGE: NORMAL

## 2022-01-17 PROCEDURE — 88305 TISSUE EXAM BY PATHOLOGIST: CPT | Mod: 26 | Performed by: PATHOLOGY

## 2022-02-02 DIAGNOSIS — J30.89 PERENNIAL ALLERGIC RHINITIS: ICD-10-CM

## 2022-02-02 NOTE — TELEPHONE ENCOUNTER
Routing refill request to provider for review/approval because:   Asthma control assessment score within normal limits in last 6 months  Never done.    Last Written Prescription Date:  1/11/22  Last Fill Quantity: 18g,  # refills: 0   Last office visit: 1/11/2022 with prescribing provider:  Leatha Kim Office Visit:      Latia Ragland RN  [facility]

## 2022-02-03 RX ORDER — ALBUTEROL SULFATE 90 UG/1
AEROSOL, METERED RESPIRATORY (INHALATION)
Qty: 18 G | Refills: 0 | Status: SHIPPED | OUTPATIENT
Start: 2022-02-03 | End: 2023-06-26

## 2022-03-22 DIAGNOSIS — F41.9 ANXIETY: ICD-10-CM

## 2022-03-22 RX ORDER — CLOMIPRAMINE HYDROCHLORIDE 25 MG/1
CAPSULE ORAL
OUTPATIENT
Start: 2022-03-22

## 2022-03-22 NOTE — TELEPHONE ENCOUNTER
Routing refill request to provider for review/approval because:  Drug not on the [facility] refill protocol     Last Written Prescription Date:  3/11/22  Last Fill Quantity: 14,  # refills: 0   Last office visit: 1/11/2022 with prescribing provider:  Leatha   Future Office Visit:      LUCRECIA Boles RN  [facility]

## 2022-03-22 NOTE — TELEPHONE ENCOUNTER
I declined refill as he wrote:    Dear Radha,     I fell much better. I do not need that prescription for my mental health anymore, but thank you. The pharmacy has just sent me the refill notice for the Rosuvastatin. I remember you wanted me to take tests around this time in the year?  Kindly let me know if this is it and how to proceed. Thank you and kindest regards     Bladimir

## 2022-03-24 ENCOUNTER — LAB (OUTPATIENT)
Dept: LAB | Facility: CLINIC | Age: 62
End: 2022-03-24
Payer: COMMERCIAL

## 2022-03-24 DIAGNOSIS — E11.9 TYPE 2 DIABETES MELLITUS WITHOUT COMPLICATION, WITHOUT LONG-TERM CURRENT USE OF INSULIN (H): ICD-10-CM

## 2022-03-24 DIAGNOSIS — Z51.81 MEDICATION MONITORING ENCOUNTER: ICD-10-CM

## 2022-03-24 LAB
ALT SERPL W P-5'-P-CCNC: 48 U/L (ref 0–70)
CHOLEST SERPL-MCNC: 101 MG/DL
FASTING STATUS PATIENT QL REPORTED: YES
HDLC SERPL-MCNC: 43 MG/DL
LDLC SERPL CALC-MCNC: 41 MG/DL
NONHDLC SERPL-MCNC: 58 MG/DL
TRIGL SERPL-MCNC: 84 MG/DL

## 2022-03-24 PROCEDURE — 80061 LIPID PANEL: CPT

## 2022-03-24 PROCEDURE — 84460 ALANINE AMINO (ALT) (SGPT): CPT

## 2022-03-24 PROCEDURE — 36415 COLL VENOUS BLD VENIPUNCTURE: CPT

## 2022-03-25 RX ORDER — ROSUVASTATIN CALCIUM 5 MG/1
5 TABLET, COATED ORAL DAILY
Qty: 90 TABLET | Refills: 3 | Status: SHIPPED | OUTPATIENT
Start: 2022-03-25 | End: 2023-06-27

## 2022-03-25 NOTE — RESULT ENCOUNTER NOTE
Tono Garrison,  Thanks for coming to lab for a fasting lab-only appointment. Your results look great.  The rosuvastatin is working well for you and I sent authorization to your pharmacy to continue filling it at the same dose for another year.    Radha Zhang MD

## 2022-03-29 ENCOUNTER — MYC MEDICAL ADVICE (OUTPATIENT)
Dept: FAMILY MEDICINE | Facility: CLINIC | Age: 62
End: 2022-03-29
Payer: COMMERCIAL

## 2022-07-03 ENCOUNTER — HEALTH MAINTENANCE LETTER (OUTPATIENT)
Age: 62
End: 2022-07-03

## 2022-08-20 NOTE — PATIENT INSTRUCTIONS
Start Coenzyme Q10 100 mg twice daily.     I recommend that patients 50 and over get the Shingrix vaccine at a pharmacy.  The pharmacist will let you know beforehand if there is a copay and can administer the vaccine.  The Shingrix vaccination is a 2-shot series.  The second dose is given 2-6 months after the first.  (It cannot be given sooner than 2 months after the first dose - at the earliest.)  You should be aware that patients are reporting feeling a bit under the weather after the injection, including fatigue, malaise, and low-grade fever that may last a couple days.  Rarely patients can get a mild, shingles-like rash.   But these symptoms are much better than the Shingles disease.    If you have MyChart:  1) I kindly request that you check your MyChart prior to all appointments with me and complete any assigned questionnaires ahead of time.    2) You may receive auto-released results from our system before I have the opportunity to review and comment.  Be assured I will review and comment on all of your results as soon as I can.      If you do not have MyChart:  1) I encourage you to sign up for Mychart ([email or web address]).  This will allow you to review your results, securely communicate with your care team, and schedule virtual visits as well.  2) Please be aware that result letters from the clinic can take up to 2 weeks but urgent results will be called to you.      FYI:  1) I do virtual (video) visits exclusively on Wednesdays.  I still do in-person visits at [facility] ([phone]) on Mondays, Tuesdays and Thursdays.  You can schedule a video visit for many conditions.  Please follow this link:  [email or web address]  2) My schedule has been booking out very far in advance (2 months).  I apologize for the lack of timely access.  If you need to be seen for a chronic condition or preventive (wellness) visit, please be sure to  schedule that appointment 2-3 months in advance.  If you have a concern that you feel cannot wait until my next available appointment (such as a hospital follow-up or new symptom of concern) please ask to speak to one of the [facility] nurses who may be able to access a sooner appointment.    I do ask that all patients who are taking chronic medications for conditions that I am managing schedule an in-person visit with me at least once a year.     To schedule any ordered imaging studies (including mammogram and/or DEXA scan) you can call [facility] Scheduling at [phone].

## 2022-08-22 ENCOUNTER — OFFICE VISIT (OUTPATIENT)
Dept: FAMILY MEDICINE | Facility: CLINIC | Age: 62
End: 2022-08-22
Payer: COMMERCIAL

## 2022-08-22 VITALS
TEMPERATURE: 99 F | BODY MASS INDEX: 34.57 KG/M2 | SYSTOLIC BLOOD PRESSURE: 108 MMHG | RESPIRATION RATE: 16 BRPM | HEART RATE: 85 BPM | OXYGEN SATURATION: 96 % | DIASTOLIC BLOOD PRESSURE: 76 MMHG | WEIGHT: 258 LBS

## 2022-08-22 DIAGNOSIS — E11.9 TYPE 2 DIABETES MELLITUS WITHOUT COMPLICATION, WITHOUT LONG-TERM CURRENT USE OF INSULIN (H): Primary | ICD-10-CM

## 2022-08-22 DIAGNOSIS — G47.62 NOCTURNAL LEG CRAMPS: ICD-10-CM

## 2022-08-22 DIAGNOSIS — Z23 NEED FOR VACCINATION: ICD-10-CM

## 2022-08-22 LAB — HBA1C MFR BLD: 6.5 % (ref 0–5.6)

## 2022-08-22 PROCEDURE — 90472 IMMUNIZATION ADMIN EACH ADD: CPT | Performed by: FAMILY MEDICINE

## 2022-08-22 PROCEDURE — 36415 COLL VENOUS BLD VENIPUNCTURE: CPT | Performed by: FAMILY MEDICINE

## 2022-08-22 PROCEDURE — 83036 HEMOGLOBIN GLYCOSYLATED A1C: CPT | Performed by: FAMILY MEDICINE

## 2022-08-22 PROCEDURE — 90677 PCV20 VACCINE IM: CPT | Performed by: FAMILY MEDICINE

## 2022-08-22 PROCEDURE — 99213 OFFICE O/P EST LOW 20 MIN: CPT | Mod: 25 | Performed by: FAMILY MEDICINE

## 2022-08-22 PROCEDURE — 90746 HEPB VACCINE 3 DOSE ADULT IM: CPT | Performed by: FAMILY MEDICINE

## 2022-08-22 PROCEDURE — 90471 IMMUNIZATION ADMIN: CPT | Performed by: FAMILY MEDICINE

## 2022-08-22 RX ORDER — METFORMIN HCL 500 MG
TABLET, EXTENDED RELEASE 24 HR ORAL
Qty: 180 TABLET | Refills: 1 | Status: SHIPPED | OUTPATIENT
Start: 2022-08-22 | End: 2023-05-12

## 2022-08-22 NOTE — PROGRESS NOTES
"  Assessment & Plan     Type 2 diabetes mellitus without complication, without long-term current use of insulin (H)  stable/well controlled - Continue current medication regimen.  Decreased plantar sensation of left foot - patient instructed to inspect bottoms of feet nightly before bed.    - HEMOGLOBIN A1C  - metFORMIN (GLUCOPHAGE XR) 500 MG 24 hr tablet  Dispense: 180 tablet; Refill: 1    Nocturnal leg cramps  Unlikely related to statin but I recommended he start Coenzyme Q-10 100 mg BID.      Need for vaccination  Hep B #2  - Pneumococcal 20 Valent Conjugate (Prevnar 20)  - HEPATITIS B VACCINE, ADULT, IM     BMI:   Estimated body mass index is 34.57 kg/m  as calculated from the following:    Height as of 1/11/22: 1.84 m (6' 0.44\").    Weight as of this encounter: 117 kg (258 lb).   Weight management plan: Discussed healthy diet and exercise guidelines    See Patient Instructions    No follow-ups on file.   Follow-up Visit   Expected date:  Feb 22, 2023 (Approximate)      Follow Up Appointment Details:     Follow-up with whom?: Me    Follow-Up for what?: Adult Preventive    Any Additional Chronic Condition Management?: Diabetes    How?: In Person                  Radha Zhang MD  [facility]        Clinton Garrison is a 62 year old, presenting for the following health issues:  Diabetes      History of Present Illness       Diabetes:   He presents for follow up of diabetes.  He is checking home blood glucose a few times a week. He checks blood glucose before meals.  Blood glucose is never over 200 and never under 70. He is aware of hypoglycemia symptoms including weakness. He has no concerns regarding his diabetes at this time.  He is not experiencing numbness or burning in feet, excessive thirst, blurry vision, weight changes or redness, sores or blisters on feet. The patient has had a diabetic eye exam in the last 12 months. Eye exam performed on 11/2021. Location of last eye " exam in [place].        He eats 2-3 servings of fruits and vegetables daily.He consumes 0 sweetened beverage(s) daily.He exercises with enough effort to increase his heart rate 30 to 60 minutes per day.  He exercises with enough effort to increase his heart rate 5 days per week.   He is taking medications regularly.       Cramps in legs and feet at night - wondering if this is related to statin.  Walks daily - 3 miles.  No claudication symptoms.    BP Readings from Last 2 Encounters:   08/22/22 108/76   01/13/22 106/78     Hemoglobin A1C (%)   Date Value   08/22/2022 6.5 (H)   12/16/2021 6.1 (H)   05/25/2021 5.7 (H)   08/24/2020 5.5     LDL Cholesterol Calculated (mg/dL)   Date Value   03/24/2022 41   12/14/2021 119 (H)   08/24/2020 84   11/25/2019 120 (H)         Wt Readings from Last 3 Encounters:   08/22/22 117 kg (258 lb)   01/11/22 117 kg (258 lb)   05/25/21 115.2 kg (254 lb)       Review of Systems   as above       Objective    /76   Pulse 85   Temp 99  F (37.2  C) (Temporal)   Resp 16   Wt 117 kg (258 lb)   SpO2 96%   BMI 34.57 kg/m    Body mass index is 34.57 kg/m .  Physical Exam   GENERAL: healthy, alert and no distress  RESP: lungs clear to auscultation - no rales, rhonchi or wheezes  CV: regular rate and rhythm, normal S1 S2, no S3 or S4, no murmur, click or rub, no peripheral edema  MS: no gross musculoskeletal defects noted, no edema  SKIN: no suspicious lesions or rashes  NEURO: Normal strength and tone, mentation intact and speech normal  PSYCH: mentation appears normal, affect normal/bright   DIABETIC FOOT EXAM:  Bilateral feet examined.  No lesions or deformities noted.  Skin is warm and dry.  Pedal pulses are diminished.  Sensation is intact to nylon filament exam in right foot and decreased on the plantar aspect of the left foot.   Results for orders placed or performed in visit on 08/22/22   HEMOGLOBIN A1C     Status: Abnormal   Result Value Ref Range    Hemoglobin A1C 6.5 (H) 0.0 -  5.6 %                   .  ..

## 2022-08-22 NOTE — NURSING NOTE
Prior to immunization administration, verified patients identity using patient s name and date of birth. Please see Immunization Activity for additional information.     Screening Questionnaire for Adult Immunization    Are you sick today?   No   Do you have allergies to medications, food, a vaccine component or latex?   No   Have you ever had a serious reaction after receiving a vaccination?   No   Do you have a long-term health problem with heart, lung, kidney, or metabolic disease (e.g., diabetes), asthma, a blood disorder, no spleen, complement component deficiency, a cochlear implant, or a spinal fluid leak?  Are you on long-term aspirin therapy?   Yes- Diabetes   Do you have cancer, leukemia, HIV/AIDS, or any other immune system problem?   No   Do you have a parent, brother, or sister with an immune system problem?   No   In the past 3 months, have you taken medications that affect  your immune system, such as prednisone, other steroids, or anticancer drugs; drugs for the treatment of rheumatoid arthritis, Crohn s disease, or psoriasis; or have you had radiation treatments?   No   Have you had a seizure, or a brain or other nervous system problem?   No   During the past year, have you received a transfusion of blood or blood    products, or been given immune (gamma) globulin or antiviral drug?   No   For women: Are you pregnant or is there a chance you could become       pregnant during the next month?   No   Have you received any vaccinations in the past 4 weeks?   No     Immunization questionnaire answers were all negative.        Per orders of Dr. Zhang, injection of PCV20, Hep B given by Patty Hendrix. Patient instructed to remain in clinic for 15 minutes afterwards, and to report any adverse reaction to me immediately.       Screening performed by Patty Hendrix on 8/22/2022 at 8:46 AM.

## 2022-09-03 ENCOUNTER — OFFICE VISIT (OUTPATIENT)
Dept: URGENT CARE | Facility: URGENT CARE | Age: 62
End: 2022-09-03
Payer: COMMERCIAL

## 2022-09-03 VITALS
HEART RATE: 81 BPM | OXYGEN SATURATION: 97 % | WEIGHT: 259 LBS | SYSTOLIC BLOOD PRESSURE: 110 MMHG | BODY MASS INDEX: 34.7 KG/M2 | TEMPERATURE: 98.6 F | DIASTOLIC BLOOD PRESSURE: 78 MMHG

## 2022-09-03 DIAGNOSIS — M79.671 RIGHT FOOT PAIN: Primary | ICD-10-CM

## 2022-09-03 PROCEDURE — 99213 OFFICE O/P EST LOW 20 MIN: CPT

## 2022-09-03 NOTE — PROGRESS NOTES
Assessment & Plan     Right foot pain    - XR Foot Right G/E 3 Views; Future    Farrah Garduno MD   Urgent Care Provider  [facility] WILMAR Garrison is a 62 year old, presenting for the following health issues:  Urgent Care and Musculoskeletal Problem (Right foot little toe pain and swollen.)      HPI     Patient was lifting weights this past week and hurt foot by twisting.  RIGHT 5th metatarsal lateral edge swelled.  Was painful x 1 day.  Then went on plane- and pain subsided.  Now remains swollen and mildy painful.  Hx of 5th metatarsal fracture in past.      Review of Systems   Constitutional, HEENT, cardiovascular, pulmonary, GI, , musculoskeletal, neuro, skin, endocrine and psych systems are negative, except as otherwise noted.      Objective    /78   Pulse 81   Temp 98.6  F (37  C) (Oral)   Wt 117.5 kg (259 lb)   SpO2 97%   BMI 34.70 kg/m    Body mass index is 34.7 kg/m .  Physical Exam   GENERAL: healthy, alert and no distress  EYES: Eyes grossly normal to inspection, PERRL and conjunctivae and sclerae normal  MS: no gross musculoskeletal defects noted,  + swelling at lateral edge of base of 5th met RIGHT foot  SKIN: no suspicious lesions or rashes  NEURO: Normal strength and tone, mentation intact and speech normal  PSYCH: mentation appears normal, affect normal/bright    Xray - Reviewed and interpreted by me.  No acute fracture noted                [unfilled]  [unfilled]

## 2022-12-07 ENCOUNTER — MYC MEDICAL ADVICE (OUTPATIENT)
Dept: FAMILY MEDICINE | Facility: CLINIC | Age: 62
End: 2022-12-07

## 2022-12-07 DIAGNOSIS — L91.8 SKIN TAG: Primary | ICD-10-CM

## 2022-12-13 NOTE — TELEPHONE ENCOUNTER
Dr. Zhang-Please review and advise/sign if agree.  1. Patient requesting skin tag removal   A. Dermatology referral pended.    Writer responded via [facility].      Thank you!  LUKAS ArceN, RN-[facility]

## 2022-12-14 NOTE — TELEPHONE ENCOUNTER
Writer responded via [employer].    LUKAS ArceN, RN-BC  [facility]

## 2022-12-28 DIAGNOSIS — E11.9 TYPE 2 DIABETES MELLITUS WITHOUT COMPLICATION, WITHOUT LONG-TERM CURRENT USE OF INSULIN (H): ICD-10-CM

## 2022-12-28 RX ORDER — ROSUVASTATIN CALCIUM 5 MG/1
5 TABLET, COATED ORAL DAILY
Qty: 90 TABLET | Refills: 3 | OUTPATIENT
Start: 2022-12-28

## 2022-12-28 NOTE — TELEPHONE ENCOUNTER
Ordered 3/25/2022. 90 tablets with 3 refills. Should have refills still.     Ángela Okeefe, BSN RN  [facility]

## 2023-01-01 ENCOUNTER — TRANSFERRED RECORDS (OUTPATIENT)
Dept: HEALTH INFORMATION MANAGEMENT | Facility: CLINIC | Age: 63
End: 2023-01-01

## 2023-01-02 ENCOUNTER — MYC MEDICAL ADVICE (OUTPATIENT)
Dept: FAMILY MEDICINE | Facility: CLINIC | Age: 63
End: 2023-01-02

## 2023-01-02 DIAGNOSIS — N20.0 RIGHT KIDNEY STONE: Primary | ICD-10-CM

## 2023-01-02 NOTE — TELEPHONE ENCOUNTER
" Providers-Please review and sign if agree. Urology referral pended.    \"Dear Radha,     Happy New Year. While on vacation I suffered a sever Kidney Stone pain. I went to the nearest ER/Hospital in [facility], [facility]. I was treated very well and professionally. The urgency was to alleviate the pain, do a CT scan and they decided to insert a stent that in due time has to be removed when I am back home. They did not do any intervention to remove the stone. I feel better today and my return flight will be on Jan 17th. I attach the medical papers. Given the size of the stone and the situation, I need a referral to a specialist to resolve this problem.  Thank you in advance and best regards. Bladimir\"    Thank you!  LUKAS ArceN, RN-[facility]    "

## 2023-01-03 ENCOUNTER — MYC MEDICAL ADVICE (OUTPATIENT)
Dept: FAMILY MEDICINE | Facility: CLINIC | Age: 63
End: 2023-01-03

## 2023-01-03 NOTE — TELEPHONE ENCOUNTER
Dear: Brandan Colin, DO    We had the pleasure of seeing your patient in the Pediatric Nephrology Clinic in [facility] on 20. Please find our consultation summary below.    Verónica Goodman is a 12 month old male who presents for a consultation regarding:    CHIEF COMPLAINT  ACUTE PYELONEPHRITIS    Verónica is accompanied and history obtained by: father         History of present illness:  Original HPI  VERÓNICA GOODMAN WAS BORN ON 2018 AND HAS PEDIATRIC NEPHROLOGY CONSULT ON 2020.  VERÓNICA IS SEEN IN CONSULTATION FOR ACUTE PYELONEPHRITIS.  VERÓNICA WAS BORN AT 39 AND 4/7 WEEKS GESTATION BY .  HE HAD BIRTH WEIGHT 3675 GRAMS, BIRTH LENGTH 50.8 CM AND BIRTH HEAD CIRCUMFERENCE   34 CM.  HE HAD APGAR SCORES OF EIGHT AT ONE MINUTE AND NINE AT FIVE MINUTES.  PRENATAL ULTRASOUNDS WERE NORMAL BY REPORT   VERÓNICA WAS NOT CIRCUMCISED IN THE  NURSERY.   VERÓNICA WAS DISCHARGED  FROM  NURSERY WITHIN TWO DAYS OF HIS BIRTH.     ON 04.10.2020, VERÓNICA WAS SEEN IN ED AT [facility] DUE TO FEVER  DEGREES F X TWO DAYS  CBC  WBC  25.3 K, HGB  10.4 GM/DL, HCT   30.8 %, PLT  661 K  URINE ANALYSIS  04.10.2020  URINE SP GRAV   1.012, URINE PH 6,  URINE DIPSTICK 30 MG/DL PROTEIN, SMALL BLOOD,POSITIVE NITRITE, LARGE LEUKOCYTE ESTERASE  MICROSCOPIC EXAMINATION OF THE URINE SEDIMENT SHOWED   6 - 10 RBC PER HPF, GREATER THAN 100 WBC PER HPF, MODERATE BACTERIA.  04.10.2020  URINE CULTURE 04.10.2020  URINE CULTURE HAS GREATER THAN 100,000 COL/ML E. COLI,  RESISTANT TO AMPICILLIN AND AMPICILLIN / SULBACTAM,  IN URINE ONLY SUSCEPTIBLE TO CEFAZOLIN,  SUSCEPTIBLE   TO ALL OTHER TESTED ANTIBIOTICS.  BLOOD CULTURE NO GROWTH.   VERÓNICA RECEIVED CEFTRIAXONE IN THE ED AND WAS TREATED WITH A TEN DAY COURSE OF CEFAZOLIN AT HOME.   URINARY TRACT INFECTION CHARACTERIZED BY FEVER AND FOUL SMELLING URINE.   MOTHER HAVING DIFFICULTY RETRACTING FORESKIN AND CLEANING FORESKIN.  VERÓNICA IS FORMULA FEEDING, ENFAMIL  Writer responded via [employer].    LUKAS ArceN, RN-BC  [facility]       HE IS EATING JEFFREY STAGE 1 AND STAGE 2 FOOD.     Today's Update:   Since the last  Pediatric  Nephrology note,   There has been history of fever,  There has been no  history of gross hematuria  There has been  history of foul smelling urine.  There has been no apparent nausea, no vomiting, no diarrhea.  There has been no constipation.  There has been no apparent abdominal pain, flank pain, dysuria.  There has been no edema  There has been no bruising or bleeding.  There was a diaper rash, treated with topical medication.  There has been no other rash.  There has been no thrush.  There has been no joint pain or joint swelling.  There has been no wheezing or shortness of breath.  There has been no pallor, cyanosis, erythema  There has been no mouth sore, no tremor.  There has been no headache, nosebleed.  There has been no hearing problem.  There has been no visual problem.  Changed from formula to soy milk      Review of Systems   Constitutional: Positive for fever. Negative for activity change, appetite change, chills, crying, diaphoresis, fatigue, irritability and unexpected weight change.   HENT: Negative for congestion, dental problem, drooling, ear discharge, ear pain, facial swelling, hearing loss, mouth sores, nosebleeds, rhinorrhea, sneezing, sore throat, tinnitus, trouble swallowing and voice change.    Eyes: Negative for photophobia, pain, discharge, redness, itching and visual disturbance.   Respiratory: Negative for apnea, cough, choking, wheezing and stridor.    Cardiovascular: Negative for chest pain, palpitations, leg swelling and cyanosis.   Gastrointestinal: Negative for abdominal distention, abdominal pain, anal bleeding, blood in stool, constipation, diarrhea, nausea, rectal pain and vomiting.   Endocrine: Negative for cold intolerance, heat intolerance, polydipsia, polyphagia and polyuria.   Genitourinary: Positive for enuresis. Negative for decreased urine volume, difficulty urinating,  dysuria, flank pain, frequency, hematuria and urgency.   Musculoskeletal: Negative for arthralgias, back pain, gait problem, joint swelling, myalgias, neck pain and neck stiffness.   Skin: Positive for rash. Negative for color change, pallor and wound.   Allergic/Immunologic: Negative for environmental allergies, food allergies and immunocompromised state.   Neurological: Negative for tremors, seizures, syncope, facial asymmetry, speech difficulty, weakness and headaches.   Hematological: Negative for adenopathy. Does not bruise/bleed easily.   Psychiatric/Behavioral: Negative for agitation, behavioral problems, confusion, hallucinations, self-injury and sleep disturbance. The patient is not hyperactive.        No current outpatient medications on file.     No current facility-administered medications for this visit.        ALLERGIES:  No Known Allergies   VERÓNICA HAS NO KNOWN OR SUSPECTED DRUG ALLERGIES    Past Medical History:   Diagnosis Date   • Acute pyelonephritis without medullary necrosis 5/17/2020       No past surgical history on file.    FAMILY HISTORY  MOTHER HEALTHY  FATHER HEALTHY  NO BROTHERS OR SISTERS AS OF 05.19.2020    MATERNAL GRAND MOTHER HEALTHY  MATERNAL GRAND FATHER HEALTHY  MATERNAL AUNTS AND UNCLES HEALTHY  PATERNAL GRAND FATHER DIABETES MELLITUS  OTHERWISE, PATERNAL FAMILY HISTORY IS NOT KNOWN     SOCIAL HISTORY  VERÓNICA GOODMAN IS LIVING IN [place], [place] WITH  MOTHER, MATERNAL GRAND MOTHER, MATERNAL GRAND FATHER  NO PETS IN THE HOUSE  NO ONE IS SMOKING IN THE HOUSE      Vitals:    12/29/20 1435   BP: 89/56     BSA: 0.51 meters squared  Growth percentiles: >99 %ile (Z= 3.60) based on WHO (Boys, 0-2 years) Length-for-age data based on Length recorded on 12/29/2020. 96 %ile (Z= 1.72) based on WHO (Boys, 0-2 years) weight-for-age data using vitals from 12/29/2020.   Physical Exam  Vitals signs and nursing note reviewed.   Constitutional:       General: He is active. He is not in acute  distress.     Appearance: He is well-developed. He is not toxic-appearing or diaphoretic.   HENT:      Head: Normocephalic and atraumatic. No signs of injury.      Right Ear: Tympanic membrane, ear canal and external ear normal. There is no impacted cerumen. Tympanic membrane is not erythematous or bulging.      Left Ear: Tympanic membrane, ear canal and external ear normal. There is no impacted cerumen. Tympanic membrane is not erythematous or bulging.      Nose: No congestion or rhinorrhea.      Mouth/Throat:      Mouth: Mucous membranes are moist.      Dentition: No dental caries.      Pharynx: Oropharynx is clear. No oropharyngeal exudate or posterior oropharyngeal erythema.      Tonsils: No tonsillar exudate.   Eyes:      General: Red reflex is present bilaterally.         Right eye: No discharge.         Left eye: No discharge.      Extraocular Movements: Extraocular movements intact.      Conjunctiva/sclera: Conjunctivae normal.      Pupils: Pupils are equal, round, and reactive to light.   Neck:      Musculoskeletal: Normal range of motion and neck supple. No neck rigidity.   Cardiovascular:      Rate and Rhythm: Normal rate and regular rhythm.      Pulses: Normal pulses.      Heart sounds: Normal heart sounds, S1 normal and S2 normal. No murmur. No friction rub. No gallop.    Pulmonary:      Effort: Pulmonary effort is normal. No respiratory distress, nasal flaring or retractions.      Breath sounds: Normal breath sounds. No stridor or decreased air movement. No wheezing, rhonchi or rales.   Abdominal:      General: Bowel sounds are normal. There is no distension.      Palpations: Abdomen is soft. There is no mass.      Tenderness: There is no abdominal tenderness. There is no guarding or rebound.      Hernia: No hernia is present.   Genitourinary:     Penis: Normal.    Musculoskeletal: Normal range of motion.         General: No swelling, tenderness, deformity or signs of injury.   Lymphadenopathy:       Cervical: No cervical adenopathy.   Skin:     General: Skin is warm.      Capillary Refill: Capillary refill takes less than 2 seconds.      Coloration: Skin is not cyanotic, jaundiced, mottled or pale.      Findings: No erythema, petechiae or rash. Rash is not purpuric.   Neurological:      General: No focal deficit present.      Mental Status: He is alert.      Cranial Nerves: No cranial nerve deficit.      Sensory: No sensory deficit.      Motor: No weakness or abnormal muscle tone.      Coordination: Coordination normal.      Gait: Gait normal.      Deep Tendon Reflexes: Reflexes normal.         Lab and Imaging Results  No results found for this or any previous visit (from the past 4200 hour(s)).    Assessment and Plan:  Verónica was seen today for follow-up.    Diagnoses and all orders for this visit:    Acute pyelonephritis without medullary necrosis  -     US KIDNEY(S) AND BLADDER URINARY TRACT; Future  -     URINALYSIS WITH MICROSCOPY EXAM W/O C/S; Future    History of pyelonephritis  -     US KIDNEY(S) AND BLADDER URINARY TRACT; Future  -     URINALYSIS WITH MICROSCOPY EXAM W/O C/S; Future    Congenital hydronephrosis  -     US KIDNEY(S) AND BLADDER URINARY TRACT; Future  -     URINALYSIS WITH MICROSCOPY EXAM W/O C/S; Future    ASSESSMENT:  1.  VERÓNICA WAS BORN AT 39 AND 4/7 WEEKS GESTATION BY .  HE HAD BIRTH WEIGHT 3675 GRAMS, BIRTH LENGTH 50.8 CM AND BIRTH HEAD CIRCUMFERENCE   34 CM.  HE HAD APGAR SCORES OF EIGHT AT ONE MINUTE AND NINE AT FIVE MINUTES.  PRENATAL ULTRASOUNDS WERE NORMAL BY REPORT   VERÓNICA WAS NOT CIRCUMCISED IN THE  NURSERY.   VERÓNICA WAS DISCHARGED  FROM  NURSERY WITHIN TWO DAYS OF HIS BIRTH.   2.  ON 04.10.2020, VERÓNICA WAS SEEN IN ED AT [facility] DUE TO FEVER  DEGREES F X TWO DAYS  CBC  WBC  25.3 K, HGB  10.4 GM/DL, HCT   30.8 %, PLT  661 K  URINE ANALYSIS  04.10.2020  URINE SP GRAV   1.012, URINE PH 6,  URINE DIPSTICK 30 MG/DL PROTEIN, SMALL BLOOD,POSITIVE NITRITE,  LARGE LEUKOCYTE ESTERASE  MICROSCOPIC EXAMINATION OF THE URINE SEDIMENT SHOWED   6 - 10 RBC PER HPF, GREATER THAN 100 WBC PER HPF, MODERATE BACTERIA.  04.10.2020  URINE CULTURE 04.10.2020  URINE CULTURE HAS GREATER THAN 100,000 COL/ML E. COLI,  RESISTANT TO AMPICILLIN AND AMPICILLIN / SULBACTAM,  IN URINE ONLY SUSCEPTIBLE TO CEFAZOLIN,  SUSCEPTIBLE   TO ALL OTHER TESTED ANTIBIOTICS.  BLOOD CULTURE NO GROWTH.   VERÓNICA RECEIVED CEFTRIAXONE IN THE ED AND WAS TREATED WITH A TEN DAY COURSE OF CEFAZOLIN AT HOME.   3.  URINARY TRACT INFECTION CHARACTERIZED BY FEVER AND FOUL SMELLING URINE.   4.  MOTHER HAVING DIFFICULTY RETRACTING FORESKIN AND CLEANING FORESKIN.  5.  DUE TO FEBRILE URINARY TRACT INFECTION, WILL EVALUATE FOR   VESICOURETERAL REFLUX  POSTERIOR URETHRAL VALVES  URETEROPELVIC JUNCTION OBSTRUCTION  URETEROVESICAL JUNCTION OBSTRUCTION  6.  05.27.2020  RENAL ULTRASOUND 05.27.2020  RIGHT KIDNEY 6.38 CM BY 3.38 CM  LEFT KIDNEY  6.35 CM BY 3.35 CM  THERE IS MILD RIGHT PELVIC DILATATION.  THERE IS NO RIGHT HYDRONEPHROSIS, RENAL MASS, RENAL CYST OR NEPHROCALCINOSIS.  THERE IS MINIMAL LEFT PELVIC DILATATION  THERE IS NO LEFT HYDRONEPHROSIS, RENAL MASS, RENAL CYST OR NEPHROCALCINOSIS.    05.27.2020  VCUG  05.27.2020  VCUG DEMONSTRATES BLADDER FILLING   VOLUME  75 ML  THERE IS NO VESICOURETERAL REFLUX SEEN DURING FILLING OR VOIDING.  THE MALE URETHRA WAS NORMAL IN APPEARANCE WITH NO EVIDENCE FOR POSTERIOR URETHRAL VALVES. THERE IS SPONTANEOUS AND NEARLY COMPLETE EMPTYING OF THE BLADDER WITH TINY POST VOID RESIDUAL.     05.27.2020  URINE CULTURE NO GROWTH X TWO DAYS FINAL     05.27.2020  URINE ANALYSIS  URINE SP GRAV  LESS THAN 1.005, URINE PH 7  URINE NEGATIVE ON DIPSTICK AND MICROSCOPIC EXAMINATION    THERE IS NO NEED FOR PROPHYLACTIC ANTIBIOTICS FOR KIDNEYS /  TRACT    FOLLOW UP IN SIX MONTHS WITH RENAL ULTRASOUND   7.  Verónica Gleason has had no symptoms or signs referable to kidneys or urinary tract   from  05.27.2020 to 12.29.2020.  Baljinder has had no known or suspected urinary tract infections from 05.27.2020 to 12.29.2020.   8.  12.29.2020  No results found for: 5COL, 5UAPP, 5UGLU, 5UBILI, 5UKET, 5USPG, 5URBC, 5UPH, 5UPROT, 5UURP, POCTUNITR, 5UWBC  9.  12.29.2020  Urine analysis  12.29.2020  Urine sp gravity 1.010  Urine pH  9  Urine 30 mg/dl protein, 1+ leukocyte esterase, otherwise, negative on dipstick examination  Urine negative on microscopic examination of the urine sediment  Will send urine culture   10.  12.29.2020  Renal ultrasound 12.29.2020  Right kidney  6.39 cm by 3.50 cm  Left kidney  7.53 cm by 3.88 cm  Expected kidney length based upon body length is 6.05 cm with 2 std dev 1.5 cm  There is minimal bilateral pelvic dilatation. There is no hydronephrosis, renal mass, renal cyst or nephrocalcinosis in either kidney.   11. 12.29.2020  Urine analysis 12.29.2020  Urine sp grav  1.1011, urine pH  Greater than 8.5  Urine small leukocyte esterase on urine dipstick  Microscopic examination of the urine sediment shows \"few bacteria\", otherwise negative.  12. 12.29.2020  Urine culture 12.29.2020  Urine culture has 10,000 col/ml to 50,000 col/ml mixed bacterial phylicia with no predominating type, consistent with contamination and not consistent with urinary tract infection.   Follow up in one year with renal ultrasound.     RECOMMENDATIONS:  1.  RENAL ULTRASOUND IN ONE YEAR   2.  CHECK URINE ANALYSIS AND URINE CULTURE WITH FEVER AND NO OTHER SOURCE OF INFECTION  3.  URINE CULTURE ON 12.29.2020  4.  FOLLOW UP IN ONE YEAR WITH PRECEDING RENAL ULTRASOUND       I reviewed the above recommendations with patient/family who expressed understanding and agreed with this plan.    Thank you very much for allowing me to participate in the care of this patient. If you have any questions, please do not hesitate to contact me.     Anurag Villafuerte MD  Pediatric Nephrology  [facility]/ [facility]

## 2023-01-04 NOTE — TELEPHONE ENCOUNTER
Attached from printed and faxed to number pt provided    See note to pt    Lisandra Crowe RN, BSN  [facility]

## 2023-01-04 NOTE — TELEPHONE ENCOUNTER
"I just happened to see this pt response.  Please contact pt by email for the urology referral.  COCO Chandler        \"Unfortunately I cannot receive calls easily because I am overseas and with a remarkable time difference. Could they contact me by email at [email or web address] ?  \"    I an not sure how this gets to the urology  pool.  Sending to [facility] curbside consult pool.  Please send to Urology concerige pool.    Much Thanks!  Geraldine Mann RN-[facility]         "

## 2023-01-06 NOTE — TELEPHONE ENCOUNTER
"fyi- to Dr. Zhang.     I called the Urology referral.  Pt wants to be communicated by email to get this appt set up.  I gave Urology referral team this direction.  Urology clinic will communicate this with their referral team.    I will also print this [place] Discharge summary and put in Dr. Radha Zhang's file box.  Geraldine RN        \"[facility] will call you to coordinate care as prescribed your provider. If you don't hear from a representative within 2 business days, please call [phone].   Unfortunately I cannot receive calls easily because I am overseas and with a remarkable time difference. Could they contact me by email at [email or web address] ?  \"        "

## 2023-01-09 ENCOUNTER — TELEPHONE (OUTPATIENT)
Dept: UROLOGY | Facility: CLINIC | Age: 63
End: 2023-01-09

## 2023-01-09 NOTE — TELEPHONE ENCOUNTER
Per chart review- pt has communicated to FP he is hoping to have a urology appt for stent removal and to be scheduled via [employer]. Referral cancelled to minimize number of calls with 12 hour time difference from schedulers and will set up care through [employer] messages.    Initial outreach sent today    Pt in [place] until next week

## 2023-01-09 NOTE — TELEPHONE ENCOUNTER
[facility] Call Center    Phone Message    May a detailed message be left on voicemail: yes     Reason for Call: Appointment Intake    Referring Provider Name: Marge Maurer MD in  [facility]  Diagnosis and/or Symptoms: Kidney Stones    Urgent 1/2 week referral  Please note: pt is currently in [place] but will be home 1/17 (currently 12 hour time zone difference). Pt needs to schedule follow up for kidney stones and for stent removal    Action Taken: Message routed to:  [facility] Uro    Travel Screening: Not Applicable

## 2023-01-10 NOTE — TELEPHONE ENCOUNTER
MEDICAL RECORDS REQUEST   [facility]  Urology Clinic  [address] [place] [address]  PHONE: [phone]  Fax: [phone]        FUTURE VISIT INFORMATION                                                   Bladimir Lezama, : 1960 scheduled for future visit at [facility]    APPOINTMENT INFORMATION:    Date: 2023    Provider:  Danilo Moreira MD    Reason for Visit/Diagnosis: stone cosnutl, stented in [place]    REFERRAL INFORMATION:    Referring provider:  Marge Maurer MD in  [facility]    RECORDS REQUESTED FOR VISIT                                                     NOTES  STATUS/DETAILS   OFFICE NOTE from referring provider  in process   OFFICE NOTE from other specialist  in process   DISCHARGE SUMMARY from hospital  in process   DISCHARGE REPORT from the ER  in process   OPERATIVE REPORT  in process   MEDICATION LIST  yes   LABS     URINALYSIS (UA)  in process   KIDNEY STONE     CT STONE  in process   IMAGING (IMAGES & REPORT)  in process   KUB  in process     PRE-VISIT CHECKLIST      Record collection complete If no, please explain PENDING   Appointment appropriately scheduled           (right time/right provider) Yes   Joint diagnostic appointment coordinated correctly          (ensure right order & amount of time) Yes   MyChart activation Yes   Questionnaire complete If no, please explain PENDING

## 2023-01-11 DIAGNOSIS — N20.0 KIDNEY STONE: Primary | ICD-10-CM

## 2023-01-11 NOTE — TELEPHONE ENCOUNTER
Action 2023 JTV 10:09 AM    Action Taken CSS sent [facility] ED notes from patient to scanning. Patient will work on getting a CD with images.   CSS updated Nurse Payton, Patient will be moved out of [facility]. Just waiting to see if patient can get images on a CD from [facility].     MEDICAL RECORDS REQUEST   [facility]  Urology Clinic  [address] [place] [address]  PHONE: [phone]  Fax: [phone]        FUTURE VISIT INFORMATION                                                   Bladimir Lezama, : 1960 scheduled for future visit at [facility]    APPOINTMENT INFORMATION:    Date: 2023    Provider:  James Vanegas MD    Reason for Visit/Diagnosis: stone consult- stent placed  in [place]      RECORDS REQUESTED FOR VISIT                                                     NOTES  STATUS/DETAILS   DISCHARGE REPORT from the ER  yes, 2023 -- [facility]   MEDICATION LIST  yes   IMAGES     CT RENAL pending yes,      PRE-VISIT CHECKLIST      Record collection complete Yes   Appointment appropriately scheduled           (right time/right provider) Yes   Joint diagnostic appointment coordinated correctly          (ensure right order & amount of time) Yes   MyChart activation Yes   Questionnaire complete If no, please explain pending

## 2023-01-18 ENCOUNTER — PRE VISIT (OUTPATIENT)
Dept: UROLOGY | Facility: CLINIC | Age: 63
End: 2023-01-18

## 2023-01-18 ENCOUNTER — OFFICE VISIT (OUTPATIENT)
Dept: UROLOGY | Facility: CLINIC | Age: 63
End: 2023-01-18
Payer: COMMERCIAL

## 2023-01-18 VITALS
DIASTOLIC BLOOD PRESSURE: 75 MMHG | HEART RATE: 100 BPM | SYSTOLIC BLOOD PRESSURE: 112 MMHG | WEIGHT: 259 LBS | HEIGHT: 72 IN | BODY MASS INDEX: 35.08 KG/M2

## 2023-01-18 DIAGNOSIS — E66.01 MORBID OBESITY (H): ICD-10-CM

## 2023-01-18 DIAGNOSIS — N39.0 UTI (URINARY TRACT INFECTION): ICD-10-CM

## 2023-01-18 DIAGNOSIS — N20.1 URETERAL STONE: Primary | ICD-10-CM

## 2023-01-18 LAB
ALBUMIN UR-MCNC: 100 MG/DL
APPEARANCE UR: ABNORMAL
BILIRUB UR QL STRIP: NEGATIVE
COLOR UR AUTO: YELLOW
GLUCOSE UR STRIP-MCNC: NEGATIVE MG/DL
HGB UR QL STRIP: ABNORMAL
KETONES UR STRIP-MCNC: NEGATIVE MG/DL
LEUKOCYTE ESTERASE UR QL STRIP: ABNORMAL
MUCOUS THREADS #/AREA URNS LPF: PRESENT /LPF
NITRATE UR QL: NEGATIVE
PH UR STRIP: 7 [PH] (ref 5–7)
RBC URINE: >182 /HPF
SP GR UR STRIP: 1.02 (ref 1–1.03)
SQUAMOUS EPITHELIAL: <1 /HPF
UROBILINOGEN UR STRIP-MCNC: NORMAL MG/DL
WBC URINE: 9 /HPF

## 2023-01-18 PROCEDURE — 99000 SPECIMEN HANDLING OFFICE-LAB: CPT | Performed by: PATHOLOGY

## 2023-01-18 PROCEDURE — 99203 OFFICE O/P NEW LOW 30 MIN: CPT | Performed by: UROLOGY

## 2023-01-18 PROCEDURE — 87086 URINE CULTURE/COLONY COUNT: CPT | Mod: 90 | Performed by: PATHOLOGY

## 2023-01-18 PROCEDURE — 81001 URINALYSIS AUTO W/SCOPE: CPT | Performed by: PATHOLOGY

## 2023-01-18 RX ORDER — TAMSULOSIN HYDROCHLORIDE 0.4 MG/1
0.4 CAPSULE ORAL EVERY MORNING
COMMUNITY
End: 2023-01-25

## 2023-01-18 RX ORDER — SOLIFENACIN SUCCINATE 10 MG/1
10 TABLET, FILM COATED ORAL EVERY MORNING
COMMUNITY
End: 2023-06-26

## 2023-01-18 ASSESSMENT — PAIN SCALES - GENERAL: PAINLEVEL: NO PAIN (0)

## 2023-01-18 NOTE — NURSING NOTE
Chief Complaint   Patient presents with     Consult     Kidney stone       Blood pressure 112/75, pulse (!) 128, height 1.829 m (6'), weight 117.5 kg (259 lb). Body mass index is 35.13 kg/m .    Patient Active Problem List   Diagnosis     Perennial allergic rhinitis     Type 2 diabetes mellitus (H)     Erectile dysfunction, unspecified erectile dysfunction type     Nocturnal leg cramps       Allergies   Allergen Reactions     Seasonal Allergies        Current Outpatient Medications   Medication Sig Dispense Refill     albuterol (PROAIR HFA/PROVENTIL HFA/VENTOLIN HFA) 108 (90 Base) MCG/ACT inhaler INHALE 2 PUFFS INTO THE LUNGS EVERY 6 HOURS AS NEEDED FOR SHORTNESS OF BREATH OR DIFFICULT BREATHING OR WHEEZING 18 g 0     blood glucose (NO BRAND SPECIFIED) lancets standard Use to test blood sugar 1 times daily or as directed. 100 each 3     blood glucose (NO BRAND SPECIFIED) test strip Use to test blood sugar 1 times daily or as directed. To accompany: Blood Glucose Monitor Brands: per insurance. 100 strip 6     blood glucose monitoring (NO BRAND SPECIFIED) meter device kit Use to test blood sugar 1 times daily or as directed. Preferred blood glucose meter per insurance. 1 kit 0     fluticasone (FLONASE) 50 MCG/ACT nasal spray Spray 1 spray into both nostrils daily as needed for rhinitis or allergies       metFORMIN (GLUCOPHAGE XR) 500 MG 24 hr tablet TAKE 2 TABLETS(1000 MG) BY MOUTH DAILY WITH DINNER 180 tablet 1     rosuvastatin (CRESTOR) 5 MG tablet Take 1 tablet (5 mg) by mouth daily 90 tablet 3     sildenafil (REVATIO) 20 MG tablet Take 1-4 tablets (20-80 mg) by mouth daily as needed (30 minutes prior to intercourse) 30 tablet 0     solifenacin (VESICARE) 10 MG tablet Take 10 mg by mouth daily       tamsulosin (FLOMAX) 0.4 MG capsule Take 0.4 mg by mouth daily         Social History     Tobacco Use     Smoking status: Never     Smokeless tobacco: Never   Substance Use Topics     Alcohol use: Yes     Comment: 1  glass of wine every few days     Drug use: No       Seniajames Crenshaw  1/18/2023  3:48 PM

## 2023-01-18 NOTE — PROGRESS NOTES
UROLOGY OUTPATIENT VISIT      Chief Complaint:   Right ureteral stone        Synopsis    Bladimir Lezama is a very pleasant AGE: 62 year old year old person    He has a history of DM2.  He was travelling to [place] over the new years holiday and experienced acute right renal colic on 12/31.  He was seen at a [facility] ED where a CT scan showed a 7 mm right UVJ stone with right sided hydronephrosis and a 3 mm right nonobstructing renal stone.  He underwent stent placement, per personal review of local records a 4.8F stent was placed and he was told to follow up upon return to [place].  He is currently feeling well aside from occasional dysuria and hematuria.    This is his first stone.    Labs from 12/31 in ED notable for   UA Neg nitr, +++ blood  Cr 0.88  Hgb 14.8  Plt 160           Medications     Current Outpatient Medications   Medication     albuterol (PROAIR HFA/PROVENTIL HFA/VENTOLIN HFA) 108 (90 Base) MCG/ACT inhaler     blood glucose (NO BRAND SPECIFIED) lancets standard     blood glucose (NO BRAND SPECIFIED) test strip     blood glucose monitoring (NO BRAND SPECIFIED) meter device kit     fluticasone (FLONASE) 50 MCG/ACT nasal spray     metFORMIN (GLUCOPHAGE XR) 500 MG 24 hr tablet     rosuvastatin (CRESTOR) 5 MG tablet     sildenafil (REVATIO) 20 MG tablet     solifenacin (VESICARE) 10 MG tablet     tamsulosin (FLOMAX) 0.4 MG capsule     No current facility-administered medications for this visit.         The following  distinct labs were reviewed    I personally reviewed all applicable laboratory data and went over findings with patient  Significant for:    CBC RESULTS:  Recent Labs   Lab Test 08/27/19  0937   WBC 6.3   HGB 15.3           BMP RESULTS:  Recent Labs   Lab Test 01/11/22  0918 08/24/20  0750 08/27/19  0937 05/22/19  0957 01/13/16  1010    140 139 137 141   POTASSIUM 3.6 3.8 4.4 4.3 4.1   CHLORIDE 105 109 107 104 107   CO2 25 25 25 29 28   ANIONGAP 7 6 7 4 6   GLC  172* 102* 84 172* 96   BUN 17 20 16 14 13   CR 0.92 0.88 0.88 0.93 0.95   GFRESTIMATED >90 >90 >90 >90 82   GFRESTBLACK  --  >90 >90 >90 >90  African American GFR Calc         CALCIUM RESULTS:  Recent Labs   Lab Test 01/11/22  0918 08/24/20  0750 08/27/19  0937 05/22/19  0957   SYED 9.2 9.1 9.0 9.2         HGB A1C RESULTS:  Lab Results   Component Value Date    A1C 6.5 08/22/2022    A1C 6.1 12/16/2021    A1C 5.7 05/25/2021    A1C 5.5 08/24/2020    A1C 5.3 11/25/2019    A1C 5.9 08/27/2019    A1C 9.3 05/22/2019       PSA RESULTS  PSA   Date Value Ref Range Status   06/30/2011 1.14 0 - 4 ug/L Final   05/21/2010 0.98 0 - 4 ug/L Final     Prostate Specific Antigen Screen   Date Value Ref Range Status   01/11/2022 1.46 0.00 - 4.00 ug/L Final              Assessment/Plan   62 year old year old person with right ureterral stone, stent  We discussed the surgical treatment options for renal and ureteral stones.  We discussed the risks and benefits of each approach in the context of the patient's current stone burden.  After consideration of each the decision was made to proceed with right ureteroscopic stone treatment.    We discussed the risks of bleeding, infection, incomplete stone removal, damage to adjacent organs, and need for staged procedures.          CC:  Marge Maurer Y    25 minutes spent on clinical encounter including  Review of medical records  Review of outside records  Documentation  Coordinating follow-up medical care

## 2023-01-18 NOTE — LETTER
1/18/2023       RE: Bladimir Lezama  [address]  [facility] [address]     Dear Colleague,    Thank you for referring your patient, Bladimir Lezama, to the [facility] [place] at [facility]. Please see a copy of my visit note below.          UROLOGY OUTPATIENT VISIT      Chief Complaint:   Right ureteral stone        Synopsis    Bladimir Lezama is a very pleasant AGE: 62 year old year old person    He has a history of DM2.  He was travelling to [place] over the new years holiday and experienced acute right renal colic on 12/31.  He was seen at a [facility] ED where a CT scan showed a 7 mm right UVJ stone with right sided hydronephrosis and a 3 mm right nonobstructing renal stone.  He underwent stent placement, per personal review of local records a 4.8F stent was placed and he was told to follow up upon return to [place].  He is currently feeling well aside from occasional dysuria and hematuria.    This is his first stone.    Labs from 12/31 in ED notable for   UA Neg nitr, +++ blood  Cr 0.88  Hgb 14.8  Plt 160           Medications     Current Outpatient Medications   Medication     albuterol (PROAIR HFA/PROVENTIL HFA/VENTOLIN HFA) 108 (90 Base) MCG/ACT inhaler     blood glucose (NO BRAND SPECIFIED) lancets standard     blood glucose (NO BRAND SPECIFIED) test strip     blood glucose monitoring (NO BRAND SPECIFIED) meter device kit     fluticasone (FLONASE) 50 MCG/ACT nasal spray     metFORMIN (GLUCOPHAGE XR) 500 MG 24 hr tablet     rosuvastatin (CRESTOR) 5 MG tablet     sildenafil (REVATIO) 20 MG tablet     solifenacin (VESICARE) 10 MG tablet     tamsulosin (FLOMAX) 0.4 MG capsule     No current facility-administered medications for this visit.         The following  distinct labs were reviewed    I personally reviewed all applicable laboratory data and went over findings with patient  Significant for:    CBC RESULTS:  Recent Labs   Lab  Test 08/27/19  0937   WBC 6.3   HGB 15.3           BMP RESULTS:  Recent Labs   Lab Test 01/11/22  0918 08/24/20  0750 08/27/19  0937 05/22/19  0957 01/13/16  1010    140 139 137 141   POTASSIUM 3.6 3.8 4.4 4.3 4.1   CHLORIDE 105 109 107 104 107   CO2 25 25 25 29 28   ANIONGAP 7 6 7 4 6   * 102* 84 172* 96   BUN 17 20 16 14 13   CR 0.92 0.88 0.88 0.93 0.95   GFRESTIMATED >90 >90 >90 >90 82   GFRESTBLACK  --  >90 >90 >90 >90  African American GFR Calc         CALCIUM RESULTS:  Recent Labs   Lab Test 01/11/22 0918 08/24/20 0750 08/27/19  0937 05/22/19  0957   SYED 9.2 9.1 9.0 9.2         HGB A1C RESULTS:  Lab Results   Component Value Date    A1C 6.5 08/22/2022    A1C 6.1 12/16/2021    A1C 5.7 05/25/2021    A1C 5.5 08/24/2020    A1C 5.3 11/25/2019    A1C 5.9 08/27/2019    A1C 9.3 05/22/2019       PSA RESULTS  PSA   Date Value Ref Range Status   06/30/2011 1.14 0 - 4 ug/L Final   05/21/2010 0.98 0 - 4 ug/L Final     Prostate Specific Antigen Screen   Date Value Ref Range Status   01/11/2022 1.46 0.00 - 4.00 ug/L Final              Assessment/Plan   62 year old year old person with right ureterral stone, stent  We discussed the surgical treatment options for renal and ureteral stones.  We discussed the risks and benefits of each approach in the context of the patient's current stone burden.  After consideration of each the decision was made to proceed with right ureteroscopic stone treatment.    We discussed the risks of bleeding, infection, incomplete stone removal, damage to adjacent organs, and need for staged procedures.          CC:  Marge Maurer Y    25 minutes spent on clinical encounter including  Review of medical records  Review of outside records  Documentation  Coordinating follow-up medical care      James Vanegas MD

## 2023-01-19 ENCOUNTER — ANESTHESIA EVENT (OUTPATIENT)
Dept: SURGERY | Facility: AMBULATORY SURGERY CENTER | Age: 63
End: 2023-01-19
Payer: COMMERCIAL

## 2023-01-19 ENCOUNTER — TELEPHONE (OUTPATIENT)
Dept: UROLOGY | Facility: CLINIC | Age: 63
End: 2023-01-19

## 2023-01-19 ENCOUNTER — VIRTUAL VISIT (OUTPATIENT)
Dept: SURGERY | Facility: CLINIC | Age: 63
End: 2023-01-19
Payer: COMMERCIAL

## 2023-01-19 ENCOUNTER — PATIENT OUTREACH (OUTPATIENT)
Dept: UROLOGY | Facility: CLINIC | Age: 63
End: 2023-01-19

## 2023-01-19 DIAGNOSIS — Z01.818 PREOP EXAMINATION: Primary | ICD-10-CM

## 2023-01-19 DIAGNOSIS — N39.0 UTI (URINARY TRACT INFECTION): Primary | ICD-10-CM

## 2023-01-19 DIAGNOSIS — N20.1 URETERAL STONE: ICD-10-CM

## 2023-01-19 PROCEDURE — 99203 OFFICE O/P NEW LOW 30 MIN: CPT | Mod: 95 | Performed by: CLINICAL NURSE SPECIALIST

## 2023-01-19 ASSESSMENT — ENCOUNTER SYMPTOMS
SEIZURES: 0
DYSRHYTHMIAS: 0

## 2023-01-19 ASSESSMENT — PAIN SCALES - GENERAL: PAINLEVEL: NO PAIN (0)

## 2023-01-19 ASSESSMENT — LIFESTYLE VARIABLES: TOBACCO_USE: 0

## 2023-01-19 NOTE — PATIENT INSTRUCTIONS
Preparing for Your Surgery      Name:  Bladimir Lezama   MRN:  [id number]   :  1960   Today's Date:  2023         Arriving for surgery:    Surgery date:  2023  Arrival time:  7:15 am    Restrictions due to COVID 19:    Effective 2022:  2 visitors may accompany patient and wait in the Surgery Waiting Room.  All visitors must wear a mask and social distance.     [employer] parking is available for anyone with mobility limitations or disabilities. (Monday- Friday 7 am- 5 pm)    Please come to:    [facility]  [address] [place] [phone]    Please check in on the 5th floor at the Ambulatory Surgery Center.      What can I eat or drink?    -  You may eat and drink normally until 8 hours prior to arrival  time. (Until Midnight)  -  You may have clear liquids until 2 hours prior to arrival  time. (Until 5 am)    Examples of clear liquids:  Water  Clear broth  Juices (apple, white grape, white cranberry  and cider) without pulp  Noncarbonated, powder based beverages  (lemonade and Trent-Aid)  Sodas (Sprite, 7-Up, ginger ale and seltzer)  Coffee or tea (without milk or cream)  Gatorade    --No alcohol for at least 24 hours before surgery.    Which medicines can I take?    Hold Aspirin for 7 days before surgery.   Hold Multivitamins for 7 days before surgery.  Hold Supplements for 7 days before surgery.  Hold Ibuprofen (Advil, Motrin) for 1 day before surgery--unless otherwise directed by surgeon.  Hold Naproxen (Aleve) for 4 days before surgery.    Hold Sidenafil for 24 hours before surgery           -  PLEASE TAKE the following medications the day of surgery:   Inhaler per your routine and bring to Surgery Center   Flonase   Solifenacin (vesicare)  Tamsulosin (Flomax)      How do I prepare myself?  - Please take 2 showers before surgery using Scrubcare or Hibiclens soap.    Use this soap only from the neck to your toes.     Leave the soap on your skin for one  minute--then rinse thoroughly.      You may use your own shampoo and conditioner. No other hair products.   - Please remove all jewelry and body piercings.  - No lotions, deodorants or fragrance.  - No makeup or fingernail polish.   - Bring your ID and insurance card.    -If you have a Deep Brain Stimulator, a Spinal Cord Stimulator, or any implanted Neuro Device, you must bring the remote to the Surgery Center.         ALL PATIENTS ARE REQUIRED TO HAVE A RESPONSIBLE ADULT TO DRIVE AND BE IN ATTENDANCE WITH THEM FOR 24 HOURS FOLLOWING SURGERY.     Covid testing policy as of 12/06/2022  Your surgeon will notify and schedule you for a COVID test if one is needed before surgery--please direct any questions or COVID symptoms to your surgeon      Questions or Concerns:    -For questions regarding the day of surgery, please contact the Ambulatory Surgery Center at [phone].    -If you have health changes between today and your surgery, please contact your surgeon.     - For questions after surgery, please contact your surgeon's office.

## 2023-01-19 NOTE — TELEPHONE ENCOUNTER
Spoke with: Patient     Date of surgery: Wednesday Jan 25 2023       Location: ASC      Informed patient they will need a adult : YES      Pre op with provider: KG      H&P Scheduled in PAC Patient is schedule for a VV Pac Eval  1/19/23 3pm        Pre procedure covid : Not required     Additional imaging: NA         Surgery Packet : Forward to [facility]       Additional comments: Please call with surgery teaching

## 2023-01-19 NOTE — PROGRESS NOTES
Bladimir is a 62 year old who is being evaluated via a billable video visit.      How would you like to obtain your AVS? MyChart  If the video visit is dropped, the invitation should be resent by: Text to cell phone: [phone]      HPI         Review of Systems         Physical Exam

## 2023-01-19 NOTE — H&P
Pre-Operative H & P     CC:  Preoperative exam to assess for increased cardiopulmonary risk while undergoing surgery and anesthesia.    Date of Encounter: 1/19/2023  Primary Care Physician:  Marge Maurer     Reason for visit:   Encounter Diagnoses   Name Primary?     Preop examination Yes     Ureteral stone        HPI  Bladimir Lezama is a 62 year old male who presents for pre-operative H & P in preparation for  Procedure Information     Case: [id number] Date/Time: 01/25/23 0835    Procedure: Cystoscopy, Right Ureteroscopy, Right Retrograde Pyelogram, Right Laser Lithotripsy, Possible Right Ureteral Stent Exchange, Possible Right Ureteral Stent Removal (Right: Ureter)    Anesthesia type: General    Diagnosis: Ureteral stone [N20.1]    Pre-op diagnosis: Ureteral stone [N20.1]    Location: [place] [address] / [facility]    Providers: James Vanegas MD        History is obtained from the patient and chart review    Patient who was traveling to [place], [place], and experienced acute right renal colic on 12/31/22. He was seen at a local ED where CT scan showed a 7 mm right UVJ stone with right sided hydronephrosis and a 3 mm right nonobstructing renal stone. He underwent stent placement and was asked to follow up with Urology when he returned home.    More recently he was evaluated by Dr. Vanegas. He has continued to have some right flank pain and hematuria. His imaging was reviewed and he was counseled for above procedures.    His history is otherwise significant for obesity, seasonal allergies with mild asthma, and DIABETES MELLITUS        Hx of abnormal bleeding or anti-platelet use: Denies       Past Medical History  Past Medical History:   Diagnosis Date     Impaired fasting glucose 09/17/2014    Glucose 101 9/16/2014       Obesity 07/13/2012     Perennial allergic rhinitis      Skin tag 09/16/2014     Type 2 diabetes mellitus (H) 06/10/2019     Uncomplicated  asthma     seasonal     Ureteral stone        Past Surgical History  Past Surgical History:   Procedure Laterality Date     COLONOSCOPY  9/2011    2 hyperplastic polyps - no adenomas     COLONOSCOPY N/A 1/13/2022    Procedure: COLONOSCOPY, WITH POLYPECTOMY AND BIOPSY;  Surgeon: Fredis Maya MD;  Location:  GI     TONSILLECTOMY  1970     [facility] APPENDECTOMY  1974       Prior to Admission Medications  Current Outpatient Medications   Medication Sig Dispense Refill     albuterol (PROAIR HFA/PROVENTIL HFA/VENTOLIN HFA) 108 (90 Base) MCG/ACT inhaler INHALE 2 PUFFS INTO THE LUNGS EVERY 6 HOURS AS NEEDED FOR SHORTNESS OF BREATH OR DIFFICULT BREATHING OR WHEEZING 18 g 0     fluticasone (FLONASE) 50 MCG/ACT nasal spray Spray 1 spray into both nostrils daily as needed for rhinitis or allergies       metFORMIN (GLUCOPHAGE XR) 500 MG 24 hr tablet TAKE 2 TABLETS(1000 MG) BY MOUTH DAILY WITH DINNER 180 tablet 1     rosuvastatin (CRESTOR) 5 MG tablet Take 1 tablet (5 mg) by mouth daily (Patient taking differently: Take 5 mg by mouth every evening) 90 tablet 3     sildenafil (REVATIO) 20 MG tablet Take 1-4 tablets (20-80 mg) by mouth daily as needed (30 minutes prior to intercourse) 30 tablet 0     solifenacin (VESICARE) 10 MG tablet Take 10 mg by mouth every morning       tamsulosin (FLOMAX) 0.4 MG capsule Take 0.4 mg by mouth every morning       blood glucose (NO BRAND SPECIFIED) lancets standard Use to test blood sugar 1 times daily or as directed. 100 each 3     blood glucose (NO BRAND SPECIFIED) test strip Use to test blood sugar 1 times daily or as directed. To accompany: Blood Glucose Monitor Brands: per insurance. 100 strip 6     blood glucose monitoring (NO BRAND SPECIFIED) meter device kit Use to test blood sugar 1 times daily or as directed. Preferred blood glucose meter per insurance. 1 kit 0       Allergies  Allergies   Allergen Reactions     Seasonal Allergies        Social History  Social History      Socioeconomic History     Marital status:      Spouse name: Blake     Number of children: 2     Years of education: Not on file     Highest education level: Not on file   Occupational History     Occupation:      Employer: SELF   Tobacco Use     Smoking status: Never     Smokeless tobacco: Never   Substance and Sexual Activity     Alcohol use: Yes     Comment: 1 glass of wine every few days     Drug use: No     Sexual activity: Yes     Partners: Female   Other Topics Concern     Parent/sibling w/ CABG, MI or angioplasty before 65F 55M? No   Social History Narrative    8/2019: born and brought up in [place]. Then lived 10 yrs in [place]. Has two children from his first marriage. Moved to [place] 10 yrs ago when met his wife who is from here. 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  Has a dog and a cat in [place].      Social Determinants of Health     Financial Resource Strain: Not on file   Food Insecurity: Not on file   Transportation Needs: Not on file   Physical Activity: Not on file   Stress: Not on file   Social Connections: Not on file   Intimate Partner Violence: Not on file   Housing Stability: Not on file       Family History  Family History   Problem Relation Age of Onset     Cancer Father         stomach     Gastrointestinal Disease Father         ulcers     Anesthesia Reaction No family hx of      Clotting Disorder No family hx of        Review of Systems  The complete review of systems is negative other than noted in the HPI or here.   Anesthesia Evaluation   Pt has had prior anesthetic. Type: General and MAC.    No history of anesthetic complications       ROS/MED HX  ENT/Pulmonary:     (+) allergic rhinitis, Intermittent, asthma Treatment: Inhaler prn,   (-) tobacco use and recent URI   Neurologic:    (-) no seizures and no CVA   Cardiovascular:     (+) -----Previous cardiac testing   Echo: Date: Results:    Stress Test: Date:  Results:    ECG Reviewed: Date: 2014 Results:  ST  Cath: Date: Results:   (-) taking anticoagulants/antiplatelets, DORSEY and arrhythmias   METS/Exercise Tolerance: >4 METS    Hematologic:    (-) history of blood clots and history of blood transfusion   Musculoskeletal:  - neg musculoskeletal ROS     GI/Hepatic:    (-) GERD   Renal/Genitourinary: Comment: Ureteral stone s/p stent placement    (+) Nephrolithiasis ,     Endo:     (+) type II DM, Last HgA1c: 6.5, date: 8/22/22, Not using insulin, - not using insulin pump. Normal glucose range: 120-130, Obesity,     Psychiatric/Substance Use:  - neg psychiatric ROS     Infectious Disease:  - neg infectious disease ROS     Malignancy:  - neg malignancy ROS     Other:  - neg other ROS          Virtual visit -  No vitals were obtained    Physical Exam  Constitutional: Awake, alert, no apparent distress, and appears stated age.  HENT: Normocephalic  Respiratory: non labored breathing; no cough    Neurologic: Oriented to name, place and time.   Neuropsychiatric: Calm, cooperative. Normal affect.      Prior Labs/Diagnostic Studies   All labs and imaging personally reviewed   Lab Results   Component Value Date    WBC 6.3 08/27/2019     Lab Results   Component Value Date    RBC 5.17 08/27/2019     Lab Results   Component Value Date    HGB 15.3 08/27/2019     Lab Results   Component Value Date    HCT 46.1 08/27/2019     Lab Results   Component Value Date    MCV 89 08/27/2019     Lab Results   Component Value Date    MCH 29.6 08/27/2019     Lab Results   Component Value Date    MCHC 33.2 08/27/2019     Lab Results   Component Value Date    RDW 13.8 08/27/2019     Lab Results   Component Value Date     08/27/2019     Last Comprehensive Metabolic Panel:  Sodium   Date Value Ref Range Status   01/11/2022 137 133 - 144 mmol/L Final   08/24/2020 140 133 - 144 mmol/L Final     Potassium   Date Value Ref Range Status   01/11/2022 3.6 3.4 - 5.3 mmol/L Final   08/24/2020 3.8 3.4 - 5.3  mmol/L Final     Chloride   Date Value Ref Range Status   2022 105 94 - 109 mmol/L Final   2020 109 94 - 109 mmol/L Final     Carbon Dioxide   Date Value Ref Range Status   2020 25 20 - 32 mmol/L Final     Carbon Dioxide (CO2)   Date Value Ref Range Status   2022 25 20 - 32 mmol/L Final     Anion Gap   Date Value Ref Range Status   2022 7 3 - 14 mmol/L Final   2020 6 3 - 14 mmol/L Final     Glucose   Date Value Ref Range Status   2022 172 (H) 70 - 99 mg/dL Final   2020 102 (H) 70 - 99 mg/dL Final     Comment:     Fasting specimen     Urea Nitrogen   Date Value Ref Range Status   2022 17 7 - 30 mg/dL Final   2020 20 7 - 30 mg/dL Final     Creatinine   Date Value Ref Range Status   2022 0.92 0.66 - 1.25 mg/dL Final   2020 0.88 0.66 - 1.25 mg/dL Final     GFR Estimate   Date Value Ref Range Status   2022 >90 >60 mL/min/1.73m2 Final     Comment:     Effective 2021 eGFRcr in adults is calculated using the  CKD-EPI creatinine equation which includes age and gender (Minnie et al., NEJ, DOI: 10.1056/CDBLid0767968)   2020 >90 >60 mL/min/[1.73_m2] Final     Comment:     Non  GFR Calc  Starting 2018, serum creatinine based estimated GFR (eGFR) will be   calculated using the Chronic Kidney Disease Epidemiology Collaboration   (CKD-EPI) equation.       Calcium   Date Value Ref Range Status   2022 9.2 8.5 - 10.1 mg/dL Final   2020 9.1 8.5 - 10.1 mg/dL Final     EK Sinus tachycardia      The patient's records and results personally reviewed by this provider.     Outside records reviewed from: Care Everywhere      Assessment      Bladimir Lezama is a 62 year old male seen as a PAC referral for risk assessment and optimization for anesthesia.    Plan/Recommendations  Pt will be optimized for the proposed procedure.  See below for details on the assessment, risk, and preoperative  recommendations    NEUROLOGY  - No history of TIA, CVA or seizure    -Post Op delirium risk factors:  No risk identified    ENT  - No current airway concerns.  Will need to be reassessed day of surgery.  Mallampati: Unable to assess  TM: Unable to assess    CARDIAC  No cardiac history, symptoms or meds. BP range 115-105/70s. Denies chest pain, irregular HR or DYSPNEA ON EXERTION. Good exercise tolerance.   - METS (Metabolic Equivalents)>4    RCRI: 0.4% risk of serious cardiac events    PULMONARY  Seasonal allergies with prn meds  Allergy induced asthma with no recent issues or need for inhaler.   Denies cough or shortness of breath    Intermediate risk for SHAKIR  - Tobacco History      History   Smoking Status     Never   Smokeless Tobacco     Never       GI: Denies GERD  PONV Low Risk  Total Score: 1           1 AN PONV: Patient is not a current smoker        /RENAL  - Baseline Creatinine  0.92  Ureteral stone s/p stent placement at OSH.   Will take Flomax and Vesicare on DOS.     ENDOCRINE    - BMI: Estimated body mass index is 35.13 kg/m  as calculated from the following:    Height as of 1/18/23: 1.829 m (6').    Weight as of 1/18/23: 117.5 kg (259 lb).  Obesity (BMI >30)  DIABETES MELLITUS II. Last A1c 6.5. Will hold metformin on DOS.   Crestor at HS    HEME  VTE Low Risk 0.26%            Total Score: 0      Denies personal or family history of blood clots  Denies history of blood transfusion      Final plan will be decided by the assigned anesthesia provider on the date of service.      The patient is optimized for their procedure. AVS with information on surgery time/arrival time, meds and NPO status given by nursing staff. No further diagnostic testing indicated.    Please refer to the physical examination documented by the anesthesiologist in the anesthesia record on the day of surgery.    Video-Visit Details    Type of service:  Video Visit    Provider received verbal consent for a Video Visit from the  patient? Yes     Originating Location (pt. Location): Home    Distant Location (provider location):  Off-site  Mode of Communication:  Video Conference via [employer]  On the day of service:     Prep time: 15 minutes  Visit time: 7 minutes  Documentation time: 20 minutes  ------------------------------------------  Total time: 42 minutes      KAITLIN Santana CNS  Preoperative Assessment Center  [facility]  Clinic and Surgery Center  Phone: [phone]  Fax: [phone]

## 2023-01-19 NOTE — PROGRESS NOTES
RNCC called pt for surgery teaching      All questions answered and patient will call if anything additional is needed or concerns      Procedure: Cystoscopy, Right Ureteroscopy, Right Retrograde Pyelogram, Right Laser Lithotripsy, Possible Right Ureteral Stent Exchange, Possible Right Ureteral Stent Removal    Date: 01/25/2023  Provider: Romina     Post op appt: monica    H&P: yes  UA/UC: yes  COVID test: na    Medications: yes  Soap: yes  Reviewed when to start clear liquids and when to start NPO: yes  : yes  24 hour observation: yes    Pt or family member expressed understanding: yes    Barbra Dunbar RN  1/19/2023  1:45 PM

## 2023-01-20 ENCOUNTER — PRE VISIT (OUTPATIENT)
Dept: UROLOGY | Facility: CLINIC | Age: 63
End: 2023-01-20

## 2023-01-20 LAB — BACTERIA UR CULT: NO GROWTH

## 2023-01-25 ENCOUNTER — ANESTHESIA (OUTPATIENT)
Dept: SURGERY | Facility: AMBULATORY SURGERY CENTER | Age: 63
End: 2023-01-25
Payer: COMMERCIAL

## 2023-01-25 ENCOUNTER — ANCILLARY PROCEDURE (OUTPATIENT)
Dept: RADIOLOGY | Facility: AMBULATORY SURGERY CENTER | Age: 63
End: 2023-01-25
Attending: UROLOGY
Payer: COMMERCIAL

## 2023-01-25 ENCOUNTER — HOSPITAL ENCOUNTER (OUTPATIENT)
Facility: AMBULATORY SURGERY CENTER | Age: 63
Discharge: HOME | End: 2023-01-25
Attending: UROLOGY
Payer: COMMERCIAL

## 2023-01-25 VITALS
RESPIRATION RATE: 16 BRPM | SYSTOLIC BLOOD PRESSURE: 95 MMHG | HEIGHT: 73 IN | OXYGEN SATURATION: 94 % | TEMPERATURE: 97.4 F | WEIGHT: 258 LBS | HEART RATE: 75 BPM | BODY MASS INDEX: 34.19 KG/M2 | DIASTOLIC BLOOD PRESSURE: 62 MMHG

## 2023-01-25 DIAGNOSIS — R52 PAIN: ICD-10-CM

## 2023-01-25 DIAGNOSIS — N20.1 URETERAL STONE: ICD-10-CM

## 2023-01-25 LAB — GLUCOSE BLDC GLUCOMTR-MCNC: 133 MG/DL (ref 70–99)

## 2023-01-25 PROCEDURE — 52356 CYSTO/URETERO W/LITHOTRIPSY: CPT | Mod: RT

## 2023-01-25 PROCEDURE — 52356 CYSTO/URETERO W/LITHOTRIPSY: CPT | Mod: RT | Performed by: UROLOGY

## 2023-01-25 PROCEDURE — 74420 UROGRAPHY RTRGR +-KUB: CPT | Mod: TC

## 2023-01-25 PROCEDURE — 74420 UROGRAPHY RTRGR +-KUB: CPT | Mod: 26 | Performed by: UROLOGY

## 2023-01-25 PROCEDURE — 82962 GLUCOSE BLOOD TEST: CPT | Performed by: PATHOLOGY

## 2023-01-25 PROCEDURE — 82365 CALCULUS SPECTROSCOPY: CPT | Mod: 90 | Performed by: PATHOLOGY

## 2023-01-25 PROCEDURE — 99000 SPECIMEN HANDLING OFFICE-LAB: CPT | Performed by: PATHOLOGY

## 2023-01-25 DEVICE — URETERAL STENT
Type: IMPLANTABLE DEVICE | Status: FUNCTIONAL
Brand: PERCUFLEX™ PLUS

## 2023-01-25 RX ORDER — HYDROMORPHONE HYDROCHLORIDE 1 MG/ML
0.4 INJECTION, SOLUTION INTRAMUSCULAR; INTRAVENOUS; SUBCUTANEOUS EVERY 5 MIN PRN
Status: DISCONTINUED | OUTPATIENT
Start: 2023-01-25 | End: 2023-01-25 | Stop reason: HOSPADM

## 2023-01-25 RX ORDER — LIDOCAINE HYDROCHLORIDE 20 MG/ML
INJECTION, SOLUTION INFILTRATION; PERINEURAL PRN
Status: DISCONTINUED | OUTPATIENT
Start: 2023-01-25 | End: 2023-01-25

## 2023-01-25 RX ORDER — ONDANSETRON 4 MG/1
4 TABLET, ORALLY DISINTEGRATING ORAL EVERY 30 MIN PRN
Status: DISCONTINUED | OUTPATIENT
Start: 2023-01-25 | End: 2023-01-25 | Stop reason: HOSPADM

## 2023-01-25 RX ORDER — FENTANYL CITRATE 50 UG/ML
50 INJECTION, SOLUTION INTRAMUSCULAR; INTRAVENOUS EVERY 5 MIN PRN
Status: DISCONTINUED | OUTPATIENT
Start: 2023-01-25 | End: 2023-01-25 | Stop reason: HOSPADM

## 2023-01-25 RX ORDER — PROPOFOL 10 MG/ML
INJECTION, EMULSION INTRAVENOUS CONTINUOUS PRN
Status: DISCONTINUED | OUTPATIENT
Start: 2023-01-25 | End: 2023-01-25

## 2023-01-25 RX ORDER — TAMSULOSIN HYDROCHLORIDE 0.4 MG/1
0.4 CAPSULE ORAL EVERY MORNING
Qty: 14 CAPSULE | Refills: 0 | Status: SHIPPED | OUTPATIENT
Start: 2023-01-25 | End: 2023-06-26

## 2023-01-25 RX ORDER — KETOROLAC TROMETHAMINE 30 MG/ML
INJECTION, SOLUTION INTRAMUSCULAR; INTRAVENOUS PRN
Status: DISCONTINUED | OUTPATIENT
Start: 2023-01-25 | End: 2023-01-25

## 2023-01-25 RX ORDER — CEFAZOLIN SODIUM 2 G/50ML
2 SOLUTION INTRAVENOUS SEE ADMIN INSTRUCTIONS
Status: DISCONTINUED | OUTPATIENT
Start: 2023-01-25 | End: 2023-01-25 | Stop reason: HOSPADM

## 2023-01-25 RX ORDER — FENTANYL CITRATE 50 UG/ML
INJECTION, SOLUTION INTRAMUSCULAR; INTRAVENOUS PRN
Status: DISCONTINUED | OUTPATIENT
Start: 2023-01-25 | End: 2023-01-25

## 2023-01-25 RX ORDER — ACETAMINOPHEN 325 MG/1
975 TABLET ORAL ONCE
Status: COMPLETED | OUTPATIENT
Start: 2023-01-25 | End: 2023-01-25

## 2023-01-25 RX ORDER — GLYCOPYRROLATE 0.2 MG/ML
INJECTION, SOLUTION INTRAMUSCULAR; INTRAVENOUS PRN
Status: DISCONTINUED | OUTPATIENT
Start: 2023-01-25 | End: 2023-01-25

## 2023-01-25 RX ORDER — LIDOCAINE 40 MG/G
CREAM TOPICAL
Status: DISCONTINUED | OUTPATIENT
Start: 2023-01-25 | End: 2023-01-25 | Stop reason: HOSPADM

## 2023-01-25 RX ORDER — CEFAZOLIN SODIUM 2 G/50ML
2 SOLUTION INTRAVENOUS
Status: COMPLETED | OUTPATIENT
Start: 2023-01-25 | End: 2023-01-25

## 2023-01-25 RX ORDER — PROPOFOL 10 MG/ML
INJECTION, EMULSION INTRAVENOUS PRN
Status: DISCONTINUED | OUTPATIENT
Start: 2023-01-25 | End: 2023-01-25

## 2023-01-25 RX ORDER — OXYCODONE HYDROCHLORIDE 5 MG/1
5 TABLET ORAL EVERY 4 HOURS PRN
Status: DISCONTINUED | OUTPATIENT
Start: 2023-01-25 | End: 2023-01-26 | Stop reason: HOSPADM

## 2023-01-25 RX ORDER — PHENAZOPYRIDINE HYDROCHLORIDE 200 MG/1
200 TABLET, FILM COATED ORAL 3 TIMES DAILY PRN
Qty: 9 TABLET | Refills: 0 | Status: SHIPPED | OUTPATIENT
Start: 2023-01-25 | End: 2023-06-26

## 2023-01-25 RX ORDER — HYDROMORPHONE HYDROCHLORIDE 1 MG/ML
0.2 INJECTION, SOLUTION INTRAMUSCULAR; INTRAVENOUS; SUBCUTANEOUS EVERY 5 MIN PRN
Status: DISCONTINUED | OUTPATIENT
Start: 2023-01-25 | End: 2023-01-25 | Stop reason: HOSPADM

## 2023-01-25 RX ORDER — IOPAMIDOL 510 MG/ML
INJECTION, SOLUTION INTRAVASCULAR PRN
Status: DISCONTINUED | OUTPATIENT
Start: 2023-01-25 | End: 2023-01-25 | Stop reason: HOSPADM

## 2023-01-25 RX ORDER — FENTANYL CITRATE 50 UG/ML
25 INJECTION, SOLUTION INTRAMUSCULAR; INTRAVENOUS EVERY 5 MIN PRN
Status: DISCONTINUED | OUTPATIENT
Start: 2023-01-25 | End: 2023-01-25 | Stop reason: HOSPADM

## 2023-01-25 RX ORDER — ONDANSETRON 2 MG/ML
4 INJECTION INTRAMUSCULAR; INTRAVENOUS EVERY 30 MIN PRN
Status: DISCONTINUED | OUTPATIENT
Start: 2023-01-25 | End: 2023-01-25 | Stop reason: HOSPADM

## 2023-01-25 RX ORDER — DEXAMETHASONE SODIUM PHOSPHATE 4 MG/ML
INJECTION, SOLUTION INTRA-ARTICULAR; INTRALESIONAL; INTRAMUSCULAR; INTRAVENOUS; SOFT TISSUE PRN
Status: DISCONTINUED | OUTPATIENT
Start: 2023-01-25 | End: 2023-01-25

## 2023-01-25 RX ORDER — SODIUM CHLORIDE, SODIUM LACTATE, POTASSIUM CHLORIDE, CALCIUM CHLORIDE 600; 310; 30; 20 MG/100ML; MG/100ML; MG/100ML; MG/100ML
INJECTION, SOLUTION INTRAVENOUS CONTINUOUS
Status: DISCONTINUED | OUTPATIENT
Start: 2023-01-25 | End: 2023-01-25 | Stop reason: HOSPADM

## 2023-01-25 RX ADMIN — KETOROLAC TROMETHAMINE 15 MG: 30 INJECTION, SOLUTION INTRAMUSCULAR; INTRAVENOUS at 09:21

## 2023-01-25 RX ADMIN — LIDOCAINE HYDROCHLORIDE 100 MG: 20 INJECTION, SOLUTION INFILTRATION; PERINEURAL at 08:43

## 2023-01-25 RX ADMIN — GLYCOPYRROLATE 0.2 MG: 0.2 INJECTION, SOLUTION INTRAMUSCULAR; INTRAVENOUS at 08:37

## 2023-01-25 RX ADMIN — ACETAMINOPHEN 975 MG: 325 TABLET ORAL at 07:48

## 2023-01-25 RX ADMIN — FENTANYL CITRATE 50 MCG: 50 INJECTION, SOLUTION INTRAMUSCULAR; INTRAVENOUS at 08:43

## 2023-01-25 RX ADMIN — PROPOFOL 150 MCG/KG/MIN: 10 INJECTION, EMULSION INTRAVENOUS at 08:43

## 2023-01-25 RX ADMIN — CEFAZOLIN SODIUM 2 G: 2 SOLUTION INTRAVENOUS at 08:35

## 2023-01-25 RX ADMIN — DEXAMETHASONE SODIUM PHOSPHATE 4 MG: 4 INJECTION, SOLUTION INTRA-ARTICULAR; INTRALESIONAL; INTRAMUSCULAR; INTRAVENOUS; SOFT TISSUE at 08:43

## 2023-01-25 RX ADMIN — PROPOFOL 200 MG: 10 INJECTION, EMULSION INTRAVENOUS at 08:43

## 2023-01-25 RX ADMIN — FENTANYL CITRATE 50 MCG: 50 INJECTION, SOLUTION INTRAMUSCULAR; INTRAVENOUS at 08:50

## 2023-01-25 RX ADMIN — SODIUM CHLORIDE, SODIUM LACTATE, POTASSIUM CHLORIDE, CALCIUM CHLORIDE: 600; 310; 30; 20 INJECTION, SOLUTION INTRAVENOUS at 07:30

## 2023-01-25 NOTE — ANESTHESIA PREPROCEDURE EVALUATION
Anesthesia Pre-Procedure Evaluation    Patient: Bladimir Lezama   MRN: [id number] : 1960        Procedure : Procedure(s):  Cystoscopy, Right Ureteroscopy, Right Retrograde Pyelogram, Right Laser Lithotripsy, Possible Right Ureteral Stent Exchange, Possible Right Ureteral Stent Removal          Past Medical History:   Diagnosis Date     Impaired fasting glucose 2014    Glucose 101 2014       Obesity 2012     Perennial allergic rhinitis      Skin tag 2014     Type 2 diabetes mellitus (H) 06/10/2019     Uncomplicated asthma     seasonal     Ureteral stone       Past Surgical History:   Procedure Laterality Date     COLONOSCOPY  2011    2 hyperplastic polyps - no adenomas     COLONOSCOPY N/A 2022    Procedure: COLONOSCOPY, WITH POLYPECTOMY AND BIOPSY;  Surgeon: Fredis Maya MD;  Location:  GI     TONSILLECTOMY  1970     [facility] [[email or web address] APPENDECTOMY  1974      Allergies   Allergen Reactions     Seasonal Allergies       Social History     Tobacco Use     Smoking status: Never     Smokeless tobacco: Never   Substance Use Topics     Alcohol use: Yes     Comment: 1 glass of wine every few days      Wt Readings from Last 1 Encounters:   23 117 kg (258 lb)        Anesthesia Evaluation            ROS/MED HX  ENT/Pulmonary:     (+) asthma     Neurologic:       Cardiovascular:       METS/Exercise Tolerance:     Hematologic:       Musculoskeletal:       GI/Hepatic:       Renal/Genitourinary:     (+) Nephrolithiasis ,     Endo:     (+) type II DM, Not using insulin, Obesity,     Psychiatric/Substance Use:       Infectious Disease:       Malignancy:       Other:               OUTSIDE LABS:  CBC:   Lab Results   Component Value Date    WBC 6.3 2019    WBC 6.8 2014    HGB 15.3 2019    HGB 15.5 2014    HCT 46.1 2019    HCT 45.7 2014     2019     2014     BMP:   Lab Results   Component Value Date     2022      08/24/2020    POTASSIUM 3.6 01/11/2022    POTASSIUM 3.8 08/24/2020    CHLORIDE 105 01/11/2022    CHLORIDE 109 08/24/2020    CO2 25 01/11/2022    CO2 25 08/24/2020    BUN 17 01/11/2022    BUN 20 08/24/2020    CR 0.92 01/11/2022    CR 0.88 08/24/2020     (H) 01/25/2023     (H) 01/11/2022     COAGS: No results found for: PTT, INR, FIBR  POC: No results found for: BGM, HCG, HCGS  HEPATIC:   Lab Results   Component Value Date    ALBUMIN 4.1 08/27/2019    PROTTOTAL 7.6 08/27/2019    ALT 48 03/24/2022    AST 23 08/27/2019    ALKPHOS 49 08/27/2019    BILITOTAL 0.9 08/27/2019     OTHER:   Lab Results   Component Value Date    A1C 6.5 (H) 08/22/2022    SYED 9.2 01/11/2022    TSH 1.90 08/27/2019       Anesthesia Plan    ASA Status:  2      Anesthesia Type: General.     - Airway: LMA              Consents    Anesthesia Plan(s) and associated risks, benefits, and realistic alternatives discussed. Questions answered and patient/representative(s) expressed understanding.     - Discussed: Risks, Benefits and Alternatives for BOTH SEDATION and the PROCEDURE were discussed     - Discussed with:  Patient      - Extended Intubation/Ventilatory Support Discussed: No.      - Patient is DNR/DNI Status: No    Use of blood products discussed: No .     Postoperative Care    Pain management: IV analgesics, Oral pain medications.        Comments:           H&P reviewed: Unable to attach H&P to encounter due to EHR limitations. H&P Update: appropriate H&P reviewed, patient examined. No interval changes since H&P (within 30 days).         Anson Guerra MD, MD

## 2023-01-25 NOTE — ANESTHESIA CARE TRANSFER NOTE
Patient: Bladimir Lezama    Procedure: Procedure(s):  Cystoscopy, Right Ureteroscopy, Right Retrograde Pyelogram, Right Laser Lithotripsy, Right Ureteral Stent Exchange       Diagnosis: Ureteral stone [N20.1]  Diagnosis Additional Information: No value filed.    Anesthesia Type:   General     Note:    Oropharynx: oropharynx clear of all foreign objects  Level of Consciousness: awake  Oxygen Supplementation: face mask  Level of Supplemental Oxygen (L/min / FiO2): 6  Independent Airway: airway patency satisfactory and stable  Dentition: dentition unchanged  Vital Signs Stable: post-procedure vital signs reviewed and stable  Report to RN Given: handoff report given  Patient transferred to: PACU  Comments: VSS and WNL, comfortable, no PONV, report to Dayna SEPULVEDA  Handoff Report: Identifed the Patient, Identified the Reponsible Provider, Reviewed the pertinent medical history, Discussed the surgical course, Reviewed Intra-OP anesthesia mangement and issues during anesthesia, Set expectations for post-procedure period and Allowed opportunity for questions and acknowledgement of understanding      Vitals:  Vitals Value Taken Time   /70 01/25/23 0950   Temp 36.4  C (97.5  F) 01/25/23 0950   Pulse 85 01/25/23 0950   Resp 14 01/25/23 0950   SpO2 93 % 01/25/23 0950       Electronically Signed By: KAITLIN Shine CRNA  January 25, 2023  10:21 AM

## 2023-01-25 NOTE — ANESTHESIA POSTPROCEDURE EVALUATION
Patient: Bladimir Lezama    Procedure: Procedure(s):  Cystoscopy, Right Ureteroscopy, Right Retrograde Pyelogram, Right Laser Lithotripsy, Right Ureteral Stent Exchange       Anesthesia Type:  General    Note:  Disposition: Outpatient   Postop Pain Control: Uneventful            Sign Out: Well controlled pain   PONV: No   Neuro/Psych: Uneventful            Sign Out: Acceptable/Baseline neuro status   Airway/Respiratory: Uneventful            Sign Out: Acceptable/Baseline resp. status   CV/Hemodynamics: Uneventful            Sign Out: Acceptable CV status; No obvious hypovolemia; No obvious fluid overload   Other NRE: NONE   DID A NON-ROUTINE EVENT OCCUR? No           Last vitals:  Vitals Value Taken Time   /70 01/25/23 0950   Temp 36.4  C (97.5  F) 01/25/23 0950   Pulse 85 01/25/23 0950   Resp 14 01/25/23 0950   SpO2 93 % 01/25/23 0950       Electronically Signed By: Anson Guerra MD, MD  January 25, 2023  2:10 PM

## 2023-01-25 NOTE — OP NOTE
OPERATIVE REPORT    PREOPERATIVE DIAGNOSIS:  Right ureteral and renal stone(s)    POSTOPERATIVE DIAGNOSIS: Same    PROCEDURES PERFORMED:   -Cystourethroscopy  -Right retrograde pyelogram with intraoperative interpretation of images  -Right ureteroscopy with holmium laser lithotripsy and basket extraction of ureteral stone  -Right ureteroscopy with basket extraction of renal stone  -Right ureteral stent exchange  STAFF SURGEON: James Vanegas MD      ANESTHESIA: General  ESTIMATED BLOOD LOSS: 1 ml  COMPLICATIONS: None.   SPECIMEN: Stone for analysis   URETERAL STENT(S):  - Right 6 x 26 cm double-J ureteral stent.  Reason for stenting: Other (inflamed appearing distal uretr).      SIGNIFICANT FINDINGS:   -Stone free with no fragments > 2mm on the right  -Unremarkable right RPG    BRIEF OPERATIVE INDICATIONS: Bladimir Lezama is a(n) 62 year old male who presented with a distal 7 mm obstructing ureteral stone, stented.  Also noted to have small renal stone on CT..  After a discussion of all risks, benefits, and alternatives, the patient elected to proceed with definitive stone management. The patient understands the potential need for more than one procedure to eliminate all stone burden.      DESCRIPTION OF PROCEDURE:  After informed consent was obtained, the patient was transported to the operating room & placed supine on the table. After adequate anesthesia was induced, the patient was placed in lithotomy and prepped and draped in the usual sterile fashion. A timeout was taken to confirm correct patient, procedure and laterality. Pre-operative IV antibiotics were administered.     A 22-Kyrgyz rigid cystoscope was inserted into a well-lubricated urethra. The urethra was notable for a flimsy bulbar stricture, focal and easily passed.    The existing right indwelling ureteral stent was identified and removed with the flexible stent grasper to the level of the urethral meatus. A sensor wire was advanced up to the renal  pelvis under fluoroscopic guidance and the previous stent was removed.     Ureteral Stone  We assembled and passed a semirigid ureteroscope to the distal ureter and identified the stone.  It was moderately impacted and we had to nudge it away from the wall of the ureter to make space to laser it.  We used the 200 micron holmium fiber to perform lithotripsy at 6.4W.  The stone pieces were extracted with a basket. There was moderate mucosal edema and inflammation where the stone had been stuck.    Renal Stone  We next passed a flexible ureteroscope to the kidney.  The papillae had moderate plaque and several overgrowth calcifications the largest of which was about 3 mm in the upper pole.  We basket extracted it intact.  Pullback ureteroscopy was unremarkable aside from the aforementioned area of ureter distally where the stone was impacted.    We performed final retrograde pyelogram showing no extravasation.  We passed a 6 x 26 double J stent which had a full renal and bladder curl as well as a string.  We emptied the bladder.      The patient tolerated the procedure well and there were no apparent complications. The patient  was transported to the postanesthesia care unit in stable condition.     POSTOP PLAN:  Stent removal va string in 48 hours

## 2023-01-25 NOTE — DISCHARGE INSTRUCTIONS
[facility]  Home Care Following Anesthesia  For 24 hours after surgery:  Get plenty of rest.  A responsible adult must stay with you for at least 24 hours after you leave the surgery center.  Do not drive or use heavy equipment.  If you have weakness or tingling, don't drive or use heavy equipment until this feeling goes away.   Do not drink alcohol.   Avoid strenuous or risky activities.  Ask for help when climbing stairs.  You may feel lightheaded.  IF so, sit for a few minutes before standing.  Have someone help you get up.   If you have nausea (feel sick to your stomach): Drink only clear liquids such as apple juice, ginger ale, broth or 7-Up.  Rest may also help.  Be sure to drink enough fluids.  Move to a regular diet as you feel able.   You may have a slight fever.  Call the doctor if your fever is over 100 F (37.7 C) (taken under the tongue) or lasts longer than 24 hours.  You may have a dry mouth, a sore throat, muscle aches or trouble sleeping. These should go away after 24 hours.  Do not make important or legal decisions.   It is recommended to avoid smoking.               Tips for taking pain medications  To get the best pain relief possible, remember these points:  Take pain medications as directed, before pain becomes severe.  Pain medication can upset your stomach: taking it with food may help.  Constipation is a common side effect of pain medication. Drink plenty of  fluids.  Eat foods high in fiber. Take a stool softener if recommended by your doctor or pharmacist.  Do not drink alcohol, drive or operate machinery while taking pain medications.  Ask about other ways to control pain, such as with heat, ice or relaxation.    Tylenol/Acetaminophen Consumption  To help encourage the safe use of acetaminophen, the makers of TYLENOL  have lowered the maximum daily dose for single-ingredient Extra Strength TYLENOL  (acetaminophen) products sold in the [place] from 8 pills  per day (4,000 mg) to 6 pills per day (3,000 mg). The dosing interval has also changed from 2 pills every 4-6 hours to 2 pills every 6 hours.  If you feel your pain relief is insufficient, you may take Tylenol/Acetaminophen in addition to your narcotic pain medication.   Be careful not to exceed 3,000 mg of Tylenol/Acetaminophen in a 24 hour period from all sources.  If you are taking extra strength Tylenol/acetaminophen (500 mg), the maximum dose is 6 tablets in 24 hours.  If you are taking regular strength acetaminophen (325 mg), the maximum dose is 9 tablets in 24 hours.    Call a doctor for any of the following:  Signs of infection (fever, growing tenderness at the surgery site, a large amount of drainage or bleeding, severe pain, foul-smelling drainage, redness, swelling).  It has been over 8 to 10 hours since surgery and you are still not able to urinate (pass water).  Headache for over 24 hours.  Numbness, tingling or weakness the day after surgery (if you had spinal anesthesia).  Signs of Covid-19 infection (temperature over 100 degrees, shortness of breath, cough, loss of taste/smell, generalized body aches, persistent headache, chills, sore throat, nausea/vomiting/diarrhea)  Your doctor is:  Dr. James Vanegas, Prostate and Urology: [phone]                    Or dial [phone] and ask for the resident on call for:  Prostate Urology  For emergency care, call the:  [facility]:  [phone] (TTY for hearing impaired: [phone])

## 2023-01-26 ENCOUNTER — TELEPHONE (OUTPATIENT)
Dept: UROLOGY | Facility: CLINIC | Age: 63
End: 2023-01-26
Payer: COMMERCIAL

## 2023-01-26 NOTE — TELEPHONE ENCOUNTER
Spoke to pt. Reassured pt that he is able to pull stent at home and no appointment needed. Discussed how to do this and how it may feel like. Pt verbalized understanding. Will call back as needed.     Avis Schaefer, MSN RN

## 2023-01-26 NOTE — TELEPHONE ENCOUNTER
[facility] Call Center    Phone Message    May a detailed message be left on voicemail: yes     Reason for Call: Other: pt calling and needs appt tomorrow to remove stent per Dr Vanegas, please advise with him     Action Taken: urology    Travel Screening: Not Applicable

## 2023-01-28 LAB
APPEARANCE STONE: NORMAL
COMPN STONE: NORMAL
SPECIMEN WT: 11 MG

## 2023-02-01 ENCOUNTER — TRANSFERRED RECORDS (OUTPATIENT)
Dept: HEALTH INFORMATION MANAGEMENT | Facility: CLINIC | Age: 63
End: 2023-02-01

## 2023-02-14 ENCOUNTER — PRE VISIT (OUTPATIENT)
Dept: UROLOGY | Facility: CLINIC | Age: 63
End: 2023-02-14
Payer: COMMERCIAL

## 2023-02-14 NOTE — TELEPHONE ENCOUNTER
Reason for visit: post op      Dx/Hx/Sx: right ureteral and renal stone     Records/imaging/labs/orders: renal US and XR scheduled 3/6    At Rooming: video visit

## 2023-02-21 ENCOUNTER — MYC MEDICAL ADVICE (OUTPATIENT)
Dept: FAMILY MEDICINE | Facility: CLINIC | Age: 63
End: 2023-02-21
Payer: COMMERCIAL

## 2023-02-21 DIAGNOSIS — Z11.3 SCREEN FOR STD (SEXUALLY TRANSMITTED DISEASE): Primary | ICD-10-CM

## 2023-02-24 NOTE — TELEPHONE ENCOUNTER
"Dr. Maurer - pt was reccommended to get tested for herpes and would like to schedule a lab appt. No labs ordered yet. Please order labs if appropriate. Thanks    \"Blake has been tested positive to Herpes type 1 and 2 and her provider recommended also a test for me. I will be in [place] from 3 to 8 March. How do I book ?     Thank you and best rgds     Bladimir\"        Sujit Bhatt Cem Say, BSN RN  [facility]    "

## 2023-02-28 NOTE — TELEPHONE ENCOUNTER
Pended lab.  Please confirm this is correct order.  Thanks!    We need to let pt know when this is ordered.  COCO Chandler

## 2023-03-06 ENCOUNTER — HOSPITAL ENCOUNTER (OUTPATIENT)
Dept: GENERAL RADIOLOGY | Facility: CLINIC | Age: 63
Discharge: HOME | End: 2023-03-06
Attending: UROLOGY
Payer: COMMERCIAL

## 2023-03-06 ENCOUNTER — LAB (OUTPATIENT)
Dept: LAB | Facility: CLINIC | Age: 63
End: 2023-03-06
Payer: COMMERCIAL

## 2023-03-06 ENCOUNTER — HOSPITAL ENCOUNTER (OUTPATIENT)
Dept: ULTRASOUND IMAGING | Facility: CLINIC | Age: 63
Discharge: HOME | End: 2023-03-06
Attending: UROLOGY
Payer: COMMERCIAL

## 2023-03-06 DIAGNOSIS — N20.1 URETERAL STONE: ICD-10-CM

## 2023-03-06 DIAGNOSIS — Z11.3 SCREEN FOR STD (SEXUALLY TRANSMITTED DISEASE): ICD-10-CM

## 2023-03-06 PROCEDURE — 36415 COLL VENOUS BLD VENIPUNCTURE: CPT

## 2023-03-06 PROCEDURE — 86695 HERPES SIMPLEX TYPE 1 TEST: CPT

## 2023-03-06 PROCEDURE — 74018 RADEX ABDOMEN 1 VIEW: CPT

## 2023-03-06 PROCEDURE — 76770 US EXAM ABDO BACK WALL COMP: CPT

## 2023-03-06 PROCEDURE — 86696 HERPES SIMPLEX TYPE 2 TEST: CPT

## 2023-03-07 LAB
HSV1 IGG SERPL QL IA: 43.6 INDEX
HSV1 IGG SERPL QL IA: ABNORMAL
HSV2 IGG SERPL QL IA: 0.05 INDEX
HSV2 IGG SERPL QL IA: ABNORMAL

## 2023-03-10 ENCOUNTER — VIRTUAL VISIT (OUTPATIENT)
Dept: UROLOGY | Facility: CLINIC | Age: 63
End: 2023-03-10
Payer: COMMERCIAL

## 2023-03-10 DIAGNOSIS — N20.0 KIDNEY STONE: Primary | ICD-10-CM

## 2023-03-10 PROCEDURE — 99442 PR PHYSICIAN TELEPHONE EVALUATION 11-20 MIN: CPT | Mod: 95 | Performed by: NURSE PRACTITIONER

## 2023-03-10 NOTE — PATIENT INSTRUCTIONS
UROLOGY CLINIC VISIT PATIENT INSTRUCTIONS    -Follow up with me in 6 months with a KUB x-ray completed beforehand.     If you have any issues, questions or concerns in the meantime, do not hesitate to contact us at [phone] or via [email or web address].     Nasima Castaneda, CNP  Department of Urology

## 2023-03-10 NOTE — LETTER
3/10/2023       RE: Bladimir Lezama  [address]  [place] [address]     Dear Colleague,    Thank you for referring your patient, Bladimir Lezama, to the [facility] [place] [facility]. Please see a copy of my visit note below.    Video visit    Video start time: 1:58 PM    We lost connection after speaking on video for about 10 minutes and offered to speak over the phone instead. Once disconnected, could not reach the patient by phone for a little while. Was eventually able to reconnect with him by phone and spoke for an additional 11 minutes.    Patient location: Home    Provider location: Offsite    Video program used: [place]      Assessment/Plan:  62 year old male who was recently found to have a 7 mm UVJ stone, as well as a small 3 mm upper pole renal stone, which were treated via URS by Dr. Vanegas about 7 weeks ago. Follow up imaging shows no residual stones or hydro. Doing well from a recovery standpoint and denies any pain. We reviewed general recommendations to prevent kidney stones including the followin) Low oxalate diet. We discussed foods that are particularly high in oxalate including spinach, rhubarb, beets, nuts, nut butters, and black teas.     2) Low salt diet. Discussed common foods with high amounts of salt as well as dietary strategies to minimize sodium.     3) High fluid intake. Recommend 3 liters of fluid daily to produce goal of 2.5L of urine.     4) Modest animal protein. Recommend limiting animal protein to one serving or less daily.     5) Normal dietary calcium.    We also discussed Litholink in brief, though will defer this now in favor of follow up imaging. He will work on the above measures, particularly hydration.     -Follow up with me in 6 months with a KUB x-ray completed beforehand.     Nasima Castaneda, CNP  Department of Urology      Subjective:   62 year old male who presents for follow up  regarding kidney stones. While traveling to [place], he developed renal colic and was found to have a 7 mm UVJ stone. Was stented in [facility] and returned to [place] for definitive treatment. Underwent right-sided URS with Dr. Vanegas on 1/25/23 for treatment of this stone, which was moderately-impacted, as well as a 3mm upper pole renal stone. Stones composed of CaOx. No prior history of stones.    Follow up imaging obtained earlier this week showed no residual stones or hydronephrosis.     Today, he states that he has done well since surgery; denies any pain. He has no prior history of kidney stones. Mother has had gallstones but no renal stones. Regarding his diet, he eats a mainly Mediterranean diet. He eats a lot of vegetables and fruit. He drinks alcohol in moderation, mostly wine. He has been limiting the amount of pasta since he was diagnosed with diabetes. He tries to eat smaller quantities as often as he is hungry. He eats yogurt fairly often. Rarely drinks milk and occasionally eats cheese. He tends to not eat cheeses that are processed; he likes artisanal and traditional versions. He does not drink soda (rarely). Sometimes he drinks beers. He tends to drink water but knows he should drink more. [place].      Objective:     Exam:   Patient is a 62 year old male   No vital signs obtained due to virtual visit.  General Appearance: Well groomed, hygenic  Respiratory: No cough, no respiratory distress or labored breathing  Musculoskeletal: Grossly normal with no gross deficits  Skin: No discoloration or apparent rashes  Neurologic: No tremors  Psychiatric: Alert and oriented  Further examination is deferred due to the nature of our visit.

## 2023-03-10 NOTE — PROGRESS NOTES
Video visit    Video start time: 1:58 PM    We lost connection after speaking on video for about 10 minutes and offered to speak over the phone instead. Once disconnected, could not reach the patient by phone for a little while. Was eventually able to reconnect with him by phone and spoke for an additional 11 minutes.    Patient location: Home    Provider location: Offsite    Video program used: [facility]      Assessment/Plan:  62 year old male who was recently found to have a 7 mm UVJ stone, as well as a small 3 mm upper pole renal stone, which were treated via URS by Dr. Vanegas about 7 weeks ago. Follow up imaging shows no residual stones or hydro. Doing well from a recovery standpoint and denies any pain. We reviewed general recommendations to prevent kidney stones including the followin) Low oxalate diet. We discussed foods that are particularly high in oxalate including spinach, rhubarb, beets, nuts, nut butters, and black teas.     2) Low salt diet. Discussed common foods with high amounts of salt as well as dietary strategies to minimize sodium.     3) High fluid intake. Recommend 3 liters of fluid daily to produce goal of 2.5L of urine.     4) Modest animal protein. Recommend limiting animal protein to one serving or less daily.     5) Normal dietary calcium.    We also discussed Litholink in brief, though will defer this now in favor of follow up imaging. He will work on the above measures, particularly hydration.     -Follow up with me in 6 months with a KUB x-ray completed beforehand.     Nasima Castaneda, CNP  Department of Urology      Subjective:   62 year old male who presents for follow up regarding kidney stones. While traveling to [place], he developed renal colic and was found to have a 7 mm UVJ stone. Was stented in [facility] and returned to [place] for definitive treatment. Underwent right-sided URS with Dr. Vanegas on 23 for treatment of this stone, which was moderately-impacted, as well  as a 3mm upper pole renal stone. Stones composed of CaOx. No prior history of stones.    Follow up imaging obtained earlier this week showed no residual stones or hydronephrosis.     Today, he states that he has done well since surgery; denies any pain. He has no prior history of kidney stones. Mother has had gallstones but no renal stones. Regarding his diet, he eats a mainly Mediterranean diet. He eats a lot of vegetables and fruit. He drinks alcohol in moderation, mostly wine. He has been limiting the amount of pasta since he was diagnosed with diabetes. He tries to eat smaller quantities as often as he is hungry. He eats yogurt fairly often. Rarely drinks milk and occasionally eats cheese. He tends to not eat cheeses that are processed; he likes artisanal and traditional versions. He does not drink soda (rarely). Sometimes he drinks beers. He tends to drink water but knows he should drink more. [place].      Objective:     Exam:   Patient is a 62 year old male   No vital signs obtained due to virtual visit.  General Appearance: Well groomed, hygenic  Respiratory: No cough, no respiratory distress or labored breathing  Musculoskeletal: Grossly normal with no gross deficits  Skin: No discoloration or apparent rashes  Neurologic: No tremors  Psychiatric: Alert and oriented  Further examination is deferred due to the nature of our visit.

## 2023-03-30 NOTE — PROGRESS NOTES
Subjective     Bladimir Lezama is a 59 year old male who presents to clinic today for the following health issues:    HPI   Diabetes Follow-up    How often are you checking your blood sugar? A few times a week  What time of day are you checking your blood sugars (select all that apply)?  Before meals  Have you had any blood sugars above 200?  No  Have you had any blood sugars below 70?  No    What symptoms do you notice when your blood sugar is low?  None    What concerns do you have today about your diabetes? None     Do you have any of these symptoms? (Select all that apply)  No numbness or tingling in feet.  No redness, sores or blisters on feet.  No complaints of excessive thirst.  No reports of blurry vision.  No significant changes to weight.     Have you had a diabetic eye exam in the last 12 months? Yes- Date of last eye exam:     BP Readings from Last 2 Encounters:   08/27/19 102/70   06/10/19 108/74     Hemoglobin A1C (%)   Date Value   08/27/2019 5.9 (H)   05/22/2019 9.3 (H)     LDL Cholesterol Calculated (mg/dL)   Date Value   05/22/2019 100 (H)   01/13/2016 117 (H)       Diabetes Management Resources    The 10-year ASCVD risk score (Kaleighjunie MAC Jr., et al., 2013) is: 12.2%    Values used to calculate the score:      Age: 59 years      Sex: Male      Is Non- : No      Diabetic: Yes      Tobacco smoker: No      Systolic Blood Pressure: 118 mmHg      Is BP treated: No      HDL Cholesterol: 38 mg/dL      Total Cholesterol: 166 mg/dL    Average blood sugar 121.  No polyuria or polydipsia. No neuropathy.   No side effects from medication.    Reviewed and updated as needed this visit by Provider         Review of Systems   ROS COMP: Constitutional, HEENT, cardiovascular, pulmonary, gi and gu systems are negative, except as otherwise noted.      Objective    There were no vitals taken for this visit.  There is no height or weight on file to calculate BMI.  Physical Exam   /80    Pulse 81   Temp 98  F (36.7  C) (Oral)   Wt 116.1 kg (256 lb)   SpO2 95%   BMI 34.72 kg/m     GENERAL: healthy, alert and no distress  EYES: Eyes grossly normal to inspection  HENT: nose and mouth without ulcers or lesions  PSYCH: mentation appears normal, affect normal/bright    Diagnostic Test Results:  Labs reviewed in Epic        Assessment & Plan     1. Type 2 diabetes mellitus with complication, without long-term current use of insulin (H)  - controlled refilled metformin  - up to date with eye exam - pt will request note to be faxed so that chart can be updated   - Hemoglobin A1c  - metFORMIN (GLUCOPHAGE-XR) 500 MG 24 hr tablet; Take 2 tablets (1,000 mg) by mouth daily (with dinner)  Dispense: 180 tablet; Refill: 1    2. Hyperlipidemia, unspecified hyperlipidemia type  The 10-year ASCVD risk score (Kaleigh MAC Jr., et al., 2013) is: 12.2%    Values used to calculate the score:      Age: 59 years      Sex: Male      Is Non- : No      Diabetic: Yes      Tobacco smoker: No      Systolic Blood Pressure: 118 mmHg      Is BP treated: No      HDL Cholesterol: 38 mg/dL      Total Cholesterol: 166 mg/dL   - discussed that based on his ASCVD score he would need a statin  - pt opted to repeat testing and lifestyle modifications   - Lipid Profile (Chol, Trig, HDL, LDL calc)    3. Encounter for screening for HIV  - HIV Antigen Antibody Combo    Declined influenza and shingles vaccine     Addendum -   The 10-year ASCVD risk score (Kaleigh MAC Jr., et al., 2013) is: 11.2%    Values used to calculate the score:      Age: 59 years      Sex: Male      Is Non- : No      Diabetic: Yes      Tobacco smoker: No      Systolic Blood Pressure: 118 mmHg      Is BP treated: No      HDL Cholesterol: 52 mg/dL      Total Cholesterol: 193 mg/dL    Marge Maurer MD  [facility]       Closure 2 Information: This tab is for additional flaps and grafts, including complex repair and grafts and complex repair and flaps. You can also specify a different location for the additional defect, if the location is the same you do not need to select a new one. We will insert the automated text for the repair you select below just as we do for solitary flaps and grafts. Please note that at this time if you select a location with a different insurance zone you will need to override the ICD10 and CPT if appropriate.

## 2023-04-23 ENCOUNTER — HEALTH MAINTENANCE LETTER (OUTPATIENT)
Age: 63
End: 2023-04-23

## 2023-05-12 DIAGNOSIS — E11.9 TYPE 2 DIABETES MELLITUS WITHOUT COMPLICATION, WITHOUT LONG-TERM CURRENT USE OF INSULIN (H): ICD-10-CM

## 2023-05-12 RX ORDER — METFORMIN HCL 500 MG
TABLET, EXTENDED RELEASE 24 HR ORAL
Qty: 180 TABLET | Refills: 0 | Status: SHIPPED | OUTPATIENT
Start: 2023-05-12 | End: 2023-06-26

## 2023-05-12 NOTE — TELEPHONE ENCOUNTER
Medication is being filled for 1 time refill only due to:  Patient needs labs A1c. Creatinine. Patient needs to be seen because due for 6 month follow-up.     Biguanide Agents Failed  Patient has documented A1c within the specified period of time.    If HgbA1C is 8 or greater, it needs to be on file within the past 3 months.  If less than 8, must be on file within the past 6 months.          Recent Labs   Lab Test 08/22/22  0747   A1C 6.5*       Patient's CR is NOT>1.4 OR Patient's EGFR is NOT<45 within past 12 mos.         Recent Labs   Lab Test 01/11/22 0918 08/24/20  0750   GFRESTIMATED >90 >90   GFRESTBLACK  --  >90          Recent Labs   Lab Test 01/11/22  0918   CR 0.92       Recent (6 mo) or future (30 days) visit within the authorizing provider's specialty    Last Written Prescription Date:  8/22/2022  Last Fill Quantity: 180,  # refills: 1   Last office visit: 8/22/2022. Last virtual visit: 6/1/2020 with prescribing provider:  Dr. Zhang   Future Office Visit:  6/26/2023 w/Dr. Leatha Okeefe, BSN RN  [facility]

## 2023-06-19 ASSESSMENT — ENCOUNTER SYMPTOMS
NAUSEA: 0
DIARRHEA: 0
COUGH: 0
FEVER: 0
WEAKNESS: 0
NERVOUS/ANXIOUS: 0
DYSURIA: 0
HEADACHES: 0
PALPITATIONS: 0
ARTHRALGIAS: 0
HEMATURIA: 0
CONSTIPATION: 0
SHORTNESS OF BREATH: 0
HEMATOCHEZIA: 0
CHILLS: 0
ABDOMINAL PAIN: 0
SORE THROAT: 0
HEARTBURN: 0
PARESTHESIAS: 0
FREQUENCY: 0
EYE PAIN: 0
JOINT SWELLING: 0
DIZZINESS: 0
MYALGIAS: 1

## 2023-06-26 ENCOUNTER — OFFICE VISIT (OUTPATIENT)
Dept: FAMILY MEDICINE | Facility: CLINIC | Age: 63
End: 2023-06-26
Attending: FAMILY MEDICINE
Payer: COMMERCIAL

## 2023-06-26 VITALS
SYSTOLIC BLOOD PRESSURE: 126 MMHG | HEIGHT: 73 IN | BODY MASS INDEX: 35.25 KG/M2 | HEART RATE: 78 BPM | DIASTOLIC BLOOD PRESSURE: 85 MMHG | WEIGHT: 266 LBS | TEMPERATURE: 97.2 F | OXYGEN SATURATION: 97 % | RESPIRATION RATE: 16 BRPM

## 2023-06-26 DIAGNOSIS — J45.20 MILD INTERMITTENT ASTHMA WITHOUT COMPLICATION: ICD-10-CM

## 2023-06-26 DIAGNOSIS — Z23 NEED FOR VACCINATION: ICD-10-CM

## 2023-06-26 DIAGNOSIS — E11.9 TYPE 2 DIABETES MELLITUS WITHOUT COMPLICATION, WITHOUT LONG-TERM CURRENT USE OF INSULIN (H): ICD-10-CM

## 2023-06-26 DIAGNOSIS — Z00.00 ROUTINE GENERAL MEDICAL EXAMINATION AT A HEALTH CARE FACILITY: Primary | ICD-10-CM

## 2023-06-26 DIAGNOSIS — Z12.5 SCREENING FOR PROSTATE CANCER: ICD-10-CM

## 2023-06-26 DIAGNOSIS — E66.01 SEVERE OBESITY (BMI 35.0-39.9) WITH COMORBIDITY (H): ICD-10-CM

## 2023-06-26 PROBLEM — I87.2 VENOUS STASIS DERMATITIS OF BOTH LOWER EXTREMITIES: Status: ACTIVE | Noted: 2023-06-26

## 2023-06-26 PROBLEM — I87.2 CHRONIC VENOUS INSUFFICIENCY OF LOWER EXTREMITY: Status: ACTIVE | Noted: 2023-06-26

## 2023-06-26 LAB
ANION GAP SERPL CALCULATED.3IONS-SCNC: 12 MMOL/L (ref 7–15)
BUN SERPL-MCNC: 17.5 MG/DL (ref 8–23)
CALCIUM SERPL-MCNC: 9.6 MG/DL (ref 8.8–10.2)
CHLORIDE SERPL-SCNC: 102 MMOL/L (ref 98–107)
CHOLEST SERPL-MCNC: 124 MG/DL
CREAT SERPL-MCNC: 0.86 MG/DL (ref 0.67–1.17)
CREAT UR-MCNC: 37.7 MG/DL
DEPRECATED HCO3 PLAS-SCNC: 26 MMOL/L (ref 22–29)
GFR SERPL CREATININE-BSD FRML MDRD: >90 ML/MIN/1.73M2
GLUCOSE SERPL-MCNC: 107 MG/DL (ref 70–99)
HBA1C MFR BLD: 6.7 % (ref 0–5.6)
HDLC SERPL-MCNC: 47 MG/DL
LDLC SERPL CALC-MCNC: 54 MG/DL
MICROALBUMIN UR-MCNC: <12 MG/L
MICROALBUMIN/CREAT UR: NORMAL MG/G{CREAT}
NONHDLC SERPL-MCNC: 77 MG/DL
POTASSIUM SERPL-SCNC: 4.2 MMOL/L (ref 3.4–5.3)
PSA SERPL DL<=0.01 NG/ML-MCNC: 1.77 NG/ML (ref 0–4.5)
SODIUM SERPL-SCNC: 140 MMOL/L (ref 136–145)
TRIGL SERPL-MCNC: 117 MG/DL

## 2023-06-26 PROCEDURE — 82043 UR ALBUMIN QUANTITATIVE: CPT | Performed by: FAMILY MEDICINE

## 2023-06-26 PROCEDURE — 90472 IMMUNIZATION ADMIN EACH ADD: CPT | Performed by: FAMILY MEDICINE

## 2023-06-26 PROCEDURE — 36415 COLL VENOUS BLD VENIPUNCTURE: CPT | Performed by: FAMILY MEDICINE

## 2023-06-26 PROCEDURE — G0103 PSA SCREENING: HCPCS | Performed by: FAMILY MEDICINE

## 2023-06-26 PROCEDURE — 90750 HZV VACC RECOMBINANT IM: CPT | Performed by: FAMILY MEDICINE

## 2023-06-26 PROCEDURE — 82570 ASSAY OF URINE CREATININE: CPT | Performed by: FAMILY MEDICINE

## 2023-06-26 PROCEDURE — 80048 BASIC METABOLIC PNL TOTAL CA: CPT | Performed by: FAMILY MEDICINE

## 2023-06-26 PROCEDURE — 99396 PREV VISIT EST AGE 40-64: CPT | Mod: 25 | Performed by: FAMILY MEDICINE

## 2023-06-26 PROCEDURE — 99214 OFFICE O/P EST MOD 30 MIN: CPT | Mod: 25 | Performed by: FAMILY MEDICINE

## 2023-06-26 PROCEDURE — 83036 HEMOGLOBIN GLYCOSYLATED A1C: CPT | Performed by: FAMILY MEDICINE

## 2023-06-26 PROCEDURE — 80061 LIPID PANEL: CPT | Performed by: FAMILY MEDICINE

## 2023-06-26 PROCEDURE — 99207 PR FOOT EXAM NO CHARGE: CPT | Mod: 25 | Performed by: FAMILY MEDICINE

## 2023-06-26 PROCEDURE — 90471 IMMUNIZATION ADMIN: CPT | Performed by: FAMILY MEDICINE

## 2023-06-26 PROCEDURE — 90746 HEPB VACCINE 3 DOSE ADULT IM: CPT | Performed by: FAMILY MEDICINE

## 2023-06-26 RX ORDER — ALBUTEROL SULFATE 90 UG/1
2 AEROSOL, METERED RESPIRATORY (INHALATION) EVERY 6 HOURS PRN
Qty: 18 G | Refills: 0 | Status: SHIPPED | OUTPATIENT
Start: 2023-06-26 | End: 2023-07-24

## 2023-06-26 RX ORDER — METFORMIN HCL 500 MG
TABLET, EXTENDED RELEASE 24 HR ORAL
Qty: 180 TABLET | Refills: 1 | Status: SHIPPED | OUTPATIENT
Start: 2023-06-26 | End: 2024-01-23

## 2023-06-26 ASSESSMENT — ENCOUNTER SYMPTOMS
CONSTIPATION: 0
JOINT SWELLING: 0
SORE THROAT: 0
PARESTHESIAS: 0
ABDOMINAL PAIN: 0
CHILLS: 0
WEAKNESS: 0
HEARTBURN: 0
NAUSEA: 0
FREQUENCY: 0
ARTHRALGIAS: 0
DYSURIA: 0
MYALGIAS: 1
DIZZINESS: 0
HEMATOCHEZIA: 0
EYE PAIN: 0
PALPITATIONS: 0
COUGH: 0
NERVOUS/ANXIOUS: 0
DIARRHEA: 0
SHORTNESS OF BREATH: 0
HEADACHES: 0
FEVER: 0
HEMATURIA: 0

## 2023-06-26 ASSESSMENT — PAIN SCALES - GENERAL: PAINLEVEL: NO PAIN (0)

## 2023-06-26 NOTE — LETTER
My Asthma Action Plan    Name: Bladimir Lezama   YOB: 1960  Date: 6/26/2023   My doctor: Radha Zhang MD   My clinic: [facility]        My Rescue Medicine:   Albuterol inhaler (Proair/Ventolin/Proventil HFA)  2-4 puffs EVERY 4 HOURS as needed. Use a spacer if recommended by your provider.   My Asthma Severity:   Intermittent / Exercise Induced  Know your asthma triggers: upper respiratory infections and pollens             GREEN ZONE   Good Control  I feel good  No cough or wheeze  Can work, sleep and play without asthma symptoms       Take your asthma control medicine every day.     If exercise triggers your asthma, take your rescue medication  15 minutes before exercise or sports, and  During exercise if you have asthma symptoms  Spacer to use with inhaler: If you have a spacer, make sure to use it with your inhaler             YELLOW ZONE Getting Worse  I have ANY of these:  I do not feel good  Cough or wheeze  Chest feels tight  Wake up at night   Keep taking your Green Zone medications  Start taking your rescue medicine:  every 20 minutes for up to 1 hour. Then every 4 hours for 24-48 hours.  If you stay in the Yellow Zone for more than 12-24 hours, contact your doctor.  If you do not return to the Green Zone in 12-24 hours or you get worse, start taking your oral steroid medicine if prescribed by your provider.           RED ZONE Medical Alert - Get Help  I have ANY of these:  I feel awful  Medicine is not helping  Breathing getting harder  Trouble walking or talking  Nose opens wide to breathe       Take your rescue medicine NOW  If your provider has prescribed an oral steroid medicine, start taking it NOW  Call your doctor NOW  If you are still in the Red Zone after 20 minutes and you have not reached your doctor:  Take your rescue medicine again and  Call 911 or go to the emergency room right away    See your regular doctor within 2 weeks of an Emergency Room or  Urgent Care visit for follow-up treatment.          Annual Reminders:  Meet with Asthma Educator,  Flu Shot in the Fall, consider Pneumonia Vaccination for patients with asthma (aged 19 and older).    Pharmacy:    [id number] [place], [place] - [address] AT [address] [place] [address]"[facility] #[id number] - [place], [place] - [address] AT [facility]    Electronically signed by Radha Zhang MD   Date: 06/26/23                    Asthma Triggers  How To Control Things That Make Your Asthma Worse    Triggers are things that make your asthma worse.  Look at the list below to help you find your triggers and   what you can do about them. You can help prevent asthma flare-ups by staying away from your triggers.      Trigger                                                          What you can do   Cigarette Smoke  Tobacco smoke can make asthma worse. Do not allow smoking in your home, car or around you.  Be sure no one smokes at a child s day care or school.  If you smoke, ask your health care provider for ways to help you quit.  Ask family members to quit too.  Ask your health care provider for a referral to Quit Plan to help you quit smoking, or call 9-877-811-PLAN.     Colds, Flu, Bronchitis  These are common triggers of asthma. Wash your hands often.  Don t touch your eyes, nose or mouth.  Get a flu shot every year.     Dust Mites  These are tiny bugs that live in cloth or carpet. They are too small to see. Wash sheets and blankets in hot water every week.   Encase pillows and mattress in dust mite proof covers.  Avoid having carpet if you can. If you have carpet, vacuum weekly.   Use a dust mask and HEPA vacuum.   Pollen and Outdoor Mold  Some people are allergic to trees, grass, or weed pollen, or molds. Try to keep your windows closed.  Limit time out doors when pollen count is high.   Ask you health care provider about taking medicine during allergy season.     Animal  Dander  Some people are allergic to skin flakes, urine or saliva from pets with fur or feathers. Keep pets with fur or feathers out of your home.    If you can t keep the pet outdoors, then keep the pet out of your bedroom.  Keep the bedroom door closed.  Keep pets off cloth furniture and away from stuffed toys.     Mice, Rats, and Cockroaches  Some people are allergic to the waste from these pests.   Cover food and garbage.  Clean up spills and food crumbs.  Store grease in the refrigerator.   Keep food out of the bedroom.   Indoor Mold  This can be a trigger if your home has high moisture. Fix leaking faucets, pipes, or other sources of water.   Clean moldy surfaces.  Dehumidify basement if it is damp and smelly.   Smoke, Strong Odors, and Sprays  These can reduce air quality. Stay away from strong odors and sprays, such as perfume, powder, hair spray, paints, smoke incense, paint, cleaning products, candles and new carpet.   Exercise or Sports  Some people with asthma have this trigger. Be active!  Ask your doctor about taking medicine before sports or exercise to prevent symptoms.    Warm up for 5-10 minutes before and after sports or exercise.     Other Triggers of Asthma  Cold air:  Cover your nose and mouth with a scarf.  Sometimes laughing or crying can be a trigger.  Some medicines and food can trigger asthma.

## 2023-06-26 NOTE — PATIENT INSTRUCTIONS
I recommend that patients 50 and over get the Shingrix vaccine.  One in 3 adults in the [place] will get shingles and the risk increases with age.  Shingles can cause debilitating and persistent nerve pain and can increase your risk for stroke.      If you are on medicare you must get this vaccine at a pharmacy.  The pharmacist will let you know beforehand if there is a copay and can administer the vaccine.  The Shingrix vaccination is a 2-shot series.  The second dose is given 2-6 months after the first.  (It cannot be given sooner than 2 months after the first dose - at the earliest.)  You should be aware that patients are reporting feeling a bit under the weather after the injection, including fatigue, malaise, and low-grade fever that may last a couple days.  Rarely patients can get a mild, shingles-like rash.   But these symptoms are much better than the Shingles disease.       Preventive Health Recommendations  Male Ages 50 - 64    Yearly exam:             See your health care provider every year in order to  o   Review health changes.   o   Discuss preventive care.    o   Review your medicines if your doctor has prescribed any.   Have a cholesterol test every 5 years, or more frequently if you are at risk for high cholesterol/heart disease.   Have a diabetes test (fasting glucose) every three years. If you are at risk for diabetes, you should have this test more often.   Have a colonoscopy at age 50, or have a yearly FIT test (stool test). These exams will check for colon cancer.    Talk with your health care provider about whether or not a prostate cancer screening test (PSA) is right for you.  You should be tested each year for STDs (sexually transmitted diseases), if you re at risk.     Shots: Get a flu shot each year. Get a tetanus shot every 10 years.     Nutrition:  Eat at least 5 servings of fruits and vegetables daily.   Eat whole-grain bread, whole-wheat pasta and brown rice instead of white grains and  rice.   Get adequate Calcium and Vitamin D.     Lifestyle  Exercise for at least 150 minutes a week (30 minutes a day, 5 days a week). This will help you control your weight and prevent disease.   Limit alcohol to one drink per day.   No smoking.   Wear sunscreen to prevent skin cancer.   See your dentist every six months for an exam and cleaning.   See your eye doctor every 1 to 2 years.

## 2023-06-26 NOTE — PROGRESS NOTES
SUBJECTIVE:   CC: Bladimir is an 62 year old who presents for preventative health visit.       6/26/2023     8:46 AM   Additional Questions   Roomed by Kirstin Clifton   Accompanied by Self     Healthy Habits:     Getting at least 3 servings of Calcium per day:  Yes    Bi-annual eye exam:  Yes    Dental care twice a year:  Yes    Sleep apnea or symptoms of sleep apnea:  None    Diet:  Regular (no restrictions)    Frequency of exercise:  2-3 days/week    Duration of exercise:  15-30 minutes    Taking medications regularly:  Yes    Medication side effects:  Muscle aches    PHQ-2 Total Score: 0    Additional concerns today:  No    Diabetes Follow-up    How often are you checking your blood sugar? A few times a week - usually 130-160  What time of day are you checking your blood sugars (select all that apply)?  morning  Have you had any blood sugars above 200?  No  Have you had any blood sugars below 70?  No    What symptoms do you notice when your blood sugar is low?  None    What concerns do you have today about your diabetes? None    Have you had a diabetic eye exam in the last 12 months? No      BP Readings from Last 2 Encounters:   06/26/23 126/85   01/25/23 95/62     Hemoglobin A1C (%)   Date Value   06/26/2023 6.7 (H)   08/22/2022 6.5 (H)   05/25/2021 5.7 (H)   08/24/2020 5.5     LDL Cholesterol Calculated (mg/dL)   Date Value   03/24/2022 41   12/14/2021 119 (H)   08/24/2020 84   11/25/2019 120 (H)     Wt Readings from Last 3 Encounters:   06/26/23 120.7 kg (266 lb)   01/25/23 117 kg (258 lb)   01/18/23 117.5 kg (259 lb)          Today's PHQ-2 Score:       6/26/2023     8:47 AM   PHQ-2 ( 1999 Pf[employer])   Q1: Little interest or pleasure in doing things 0   Q2: Feeling down, depressed or hopeless 0   PHQ-2 Score 0         Social History     Tobacco Use     Smoking status: Never     Smokeless tobacco: Never   Substance Use Topics     Alcohol use: Yes     Comment: 1 glass of wine every few days             6/19/2023    11:23  PM   Alcohol Use   Prescreen: >3 drinks/day or >7 drinks/week? No       Last PSA:   PSA   Date Value Ref Range Status   06/30/2011 1.14 0 - 4 ug/L Final     Prostate Specific Antigen Screen   Date Value Ref Range Status   01/11/2022 1.46 0.00 - 4.00 ug/L Final       Reviewed orders with patient. Reviewed health maintenance and updated orders accordingly - Yes  BP Readings from Last 3 Encounters:   06/26/23 126/85   01/25/23 95/62   01/18/23 112/75    Wt Readings from Last 3 Encounters:   06/26/23 120.7 kg (266 lb)   01/25/23 117 kg (258 lb)   01/18/23 117.5 kg (259 lb)           Reviewed and updated as needed this visit by clinical staff   Tobacco   Meds  Problems   Surg Hx  Fam Hx          Reviewed and updated as needed this visit by Provider     Meds  Problems   Surg Hx  Fam Hx         Past Medical History:   Diagnosis Date     Impaired fasting glucose 09/17/2014    Glucose 101 9/16/2014       Obesity 07/13/2012     Perennial allergic rhinitis      Skin tag 09/16/2014     Type 2 diabetes mellitus (H) 06/10/2019     Uncomplicated asthma     seasonal     Ureteral stone       Past Surgical History:   Procedure Laterality Date     APPENDECTOMY  1974     COLONOSCOPY  09/2011    2 hyperplastic polyps - no adenomas     COLONOSCOPY N/A 01/13/2022    Procedure: COLONOSCOPY, WITH POLYPECTOMY AND BIOPSY;  Surgeon: Fredis Maya MD;  Location:  GI     COMBINED CYSTOSCOPY, RETROGRADES, URETEROSCOPY, LASER HOLMIUM LITHOTRIPSY URETER(S), INSERT STENT Right 01/25/2023    Procedure: Cystoscopy, Right Ureteroscopy, Right Retrograde Pyelogram, Right Laser Lithotripsy, Right Ureteral Stent Exchange;  Surgeon: James Vanegas MD;  Location: [facility] OR     TONSILLECTOMY  [facility]       Review of Systems   Constitutional: Negative for chills and fever.   HENT: Negative for congestion, ear pain, hearing loss and sore throat.    Eyes: Negative for pain and visual disturbance.   Respiratory: Negative for cough and  "shortness of breath.    Cardiovascular: Negative for chest pain, palpitations and peripheral edema.   Gastrointestinal: Negative for abdominal pain, constipation, diarrhea, heartburn, hematochezia and nausea.   Genitourinary: Positive for impotence. Negative for dysuria, frequency, genital sores, hematuria, penile discharge and urgency.   Musculoskeletal: Positive for myalgias. Negative for arthralgias and joint swelling.   Skin: Negative for rash.   Neurological: Negative for dizziness, weakness, headaches and paresthesias.   Psychiatric/Behavioral: Negative for mood changes. The patient is not nervous/anxious.      Muscle cramps in legs/feet    OBJECTIVE:   /85 (BP Location: Right arm, Patient Position: Sitting, Cuff Size: Adult Regular)   Pulse 78   Temp 97.2  F (36.2  C) (Tympanic)   Resp 16   Ht 1.854 m (6' 1\")   Wt 120.7 kg (266 lb)   SpO2 97%   BMI 35.09 kg/m      Physical Exam  GENERAL: healthy, alert and no distress  EYES: Eyes grossly normal to inspection, conjunctivae and sclerae normal  HENT: ear canals and TM's normal  NECK: no adenopathy, no asymmetry, masses, or scars and thyroid normal to palpation  RESP: lungs clear to auscultation - no rales, rhonchi or wheezes  CV: regular rate and rhythm, normal S1 S2, no S3 or S4, no murmur, click or rub, 1+ peripheral edema  ABDOMEN: soft, nontender, no hepatosplenomegaly, no masses  MS: no gross musculoskeletal defects noted, no edema  SKIN: no suspicious lesions or rashes, erythematous hyperpigmentation of bilateral lower legs   NEURO: Normal strength and tone, mentation intact and speech normal  PSYCH: mentation appears normal, affect normal/bright   DIABETIC FOOT EXAM:  Bilateral feet examined.  No lesions or deformities noted.  Skin is warm and dry.  Pedal pulses are intact.  Sensation is intact to nylon filament exam.     Diagnostic Test Results:  Labs reviewed in Epic  Results for orders placed or performed in visit on 06/26/23 (from the " "past 24 hour(s))   HEMOGLOBIN A1C   Result Value Ref Range    Hemoglobin A1C 6.7 (H) 0.0 - 5.6 %       ASSESSMENT/PLAN:     Routine general medical examination at a health care facility  Reviewed/updated Health Maintenance     Screening for prostate cancer  - PROSTATE SPEC ANTIGEN SCREEN    Type 2 diabetes mellitus without complication, without long-term current use of insulin (H)  stable/well controlled- Continue current medication regimen.   - BASIC METABOLIC PANEL  - Albumin Random Urine Quantitative with Creat Ratio  - HEMOGLOBIN A1C  - Lipid panel reflex to direct LDL Fasting  - FOOT EXAM  - metFORMIN (GLUCOPHAGE XR) 500 MG 24 hr tablet  Dispense: 180 tablet; Refill: 1    BMI 35.0-39.9 with comorbidity (H)  Severe obesity complicating T2DM with weight gain noted.  I cautioned him to avoid any further weight gain.     Mild intermittent asthma without complication  stable/well controlled - primary triggers are allergens and URI's  - albuterol (PROAIR HFA/PROVENTIL HFA/VENTOLIN HFA) 108 (90 Base) MCG/ACT inhaler  Dispense: 18 g; Refill: 0  - Asthma Action Plan (AAP)    Need for vaccination  Hep B #3 and Shingrix #1  - ZOSTER VACCINE RECOMBINANT ADJUVANTED (SHINGRIX)  - HEPATITIS B, ADULT (ENGERIX-B/RECOMBIVAX HB)      COUNSELING:   Reviewed preventive health counseling, as reflected in patient instructions      BMI:   Estimated body mass index is 35.09 kg/m  as calculated from the following:    Height as of this encounter: 1.854 m (6' 1\").    Weight as of this encounter: 120.7 kg (266 lb).   Weight management plan: Discussed healthy diet and exercise guidelines      He reports that he has never smoked. He has never used smokeless tobacco.     Follow-up Visit   Expected date:  Dec 26, 2023 (Approximate)      Follow Up Appointment Details:     Follow-up with whom?: Me    Follow-Up for what?: Chronic Disease f/u    Chronic Disease f/u: Diabetes    How?: In Person                 Radha Zhang MD  [facility] " [facility]

## 2023-06-26 NOTE — NURSING NOTE
Prior to immunization administration, verified patients identity using patient s name and date of birth. Please see Immunization Activity for additional information.     Screening Questionnaire for Adult Immunization    Are you sick today?   No   Do you have allergies to medications, food, a vaccine component or latex?   No   Have you ever had a serious reaction after receiving a vaccination?   No   Do you have a long-term health problem with heart, lung, kidney, or metabolic disease (e.g., diabetes), asthma, a blood disorder, no spleen, complement component deficiency, a cochlear implant, or a spinal fluid leak?  Are you on long-term aspirin therapy?   No   Do you have cancer, leukemia, HIV/AIDS, or any other immune system problem?   No   Do you have a parent, brother, or sister with an immune system problem?   No   In the past 3 months, have you taken medications that affect  your immune system, such as prednisone, other steroids, or anticancer drugs; drugs for the treatment of rheumatoid arthritis, Crohn s disease, or psoriasis; or have you had radiation treatments?   No   Have you had a seizure, or a brain or other nervous system problem?   No   During the past year, have you received a transfusion of blood or blood    products, or been given immune (gamma) globulin or antiviral drug?   No   For women: Are you pregnant or is there a chance you could become       pregnant during the next month?   No   Have you received any vaccinations in the past 4 weeks?   No     Immunization questionnaire answers were all negative.      Patient instructed to remain in clinic for 15 minutes afterwards, and to report any adverse reactions.     Screening performed by Symone Sanabria MA on 6/26/2023 at 9:41 AM.

## 2023-06-27 RX ORDER — ROSUVASTATIN CALCIUM 5 MG/1
5 TABLET, COATED ORAL EVERY EVENING
Qty: 90 TABLET | Refills: 3 | Status: SHIPPED | OUTPATIENT
Start: 2023-06-27 | End: 2024-04-23

## 2023-06-27 NOTE — RESULT ENCOUNTER NOTE
Tono Garrison,  Nice results!  This includes basic metabolic panel (kidney panel), Hemoglobin A1C (a test assessing overall blood sugar control for the last 3 months), lipid panel (cholesterol), PSA (prostate cancer blood test), and urine albumin (protein) level.      I sent authorization to your pharmacy to fill the rosuvastatin at the same dose for another year.    Radha Zhang MD

## 2023-07-20 DIAGNOSIS — J45.20 MILD INTERMITTENT ASTHMA WITHOUT COMPLICATION: ICD-10-CM

## 2023-07-22 NOTE — TELEPHONE ENCOUNTER
"Routing refill request to provider for review/approval because:  act    Last Written Prescription Date:  6/26/23  Last Fill Quantity: 18,  # refills: 0   Last office visit provider:  6/26/23     Requested Prescriptions   Pending Prescriptions Disp Refills     albuterol (PROAIR HFA/PROVENTIL HFA/VENTOLIN HFA) 108 (90 Base) MCG/ACT inhaler 18 g 0     Sig: Inhale 2 puffs into the lungs every 6 hours as needed for shortness of breath       Asthma Maintenance Inhalers - Anticholinergics Failed - 7/20/2023  8:19 AM        Failed - Asthma control assessment score within normal limits in last 6 months     Please review ACT score.           Passed - Patient is age 12 years or older        Passed - Medication is active on med list        Passed - Recent (6 mo) or future (30 days) visit within the authorizing provider's specialty     Patient had office visit in the last 6 months or has a visit in the next 30 days with authorizing provider or within the authorizing provider's specialty.  See \"Patient Info\" tab in inbasket, or \"Choose Columns\" in Meds & Orders section of the refill encounter.           Short-Acting Beta Agonist Inhalers Protocol  Failed - 7/20/2023  8:19 AM        Failed - Asthma control assessment score within normal limits in last 6 months     Please review ACT score.           Passed - Patient is age 12 or older        Passed - Medication is active on med list        Passed - Recent (6 mo) or future (30 days) visit within the authorizing provider's specialty     Patient had office visit in the last 6 months or has a visit in the next 30 days with authorizing provider or within the authorizing provider's specialty.  See \"Patient Info\" tab in inbasket, or \"Choose Columns\" in Meds & Orders section of the refill encounter.                 Ángela Wagner RN 07/22/23 4:55 PM  "

## 2023-07-24 RX ORDER — ALBUTEROL SULFATE 90 UG/1
2 AEROSOL, METERED RESPIRATORY (INHALATION) EVERY 6 HOURS PRN
Qty: 18 G | Refills: 0 | Status: SHIPPED | OUTPATIENT
Start: 2023-07-24 | End: 2023-08-17

## 2023-08-04 ENCOUNTER — TRANSFERRED RECORDS (OUTPATIENT)
Dept: HEALTH INFORMATION MANAGEMENT | Facility: CLINIC | Age: 63
End: 2023-08-04

## 2023-08-04 LAB — RETINOPATHY: NEGATIVE

## 2023-08-11 DIAGNOSIS — E11.9 TYPE 2 DIABETES MELLITUS WITHOUT COMPLICATION, WITHOUT LONG-TERM CURRENT USE OF INSULIN (H): ICD-10-CM

## 2023-08-11 RX ORDER — METFORMIN HCL 500 MG
TABLET, EXTENDED RELEASE 24 HR ORAL
Qty: 180 TABLET | Refills: 1 | OUTPATIENT
Start: 2023-08-11

## 2023-08-17 DIAGNOSIS — J45.20 MILD INTERMITTENT ASTHMA WITHOUT COMPLICATION: ICD-10-CM

## 2023-08-17 NOTE — TELEPHONE ENCOUNTER
Protocol would like me to send ACT but note this has never been done.  Defer to provider.    Last Written Prescription Date:  7/24/23  Last Fill Quantity: 18g,  # refills: 0   Last office visit: 6/26/2023 with Leatha  Future Office Visit:      Latia RAMIREZ RN  [facility]

## 2023-08-18 RX ORDER — ALBUTEROL SULFATE 90 UG/1
2 AEROSOL, METERED RESPIRATORY (INHALATION) EVERY 6 HOURS PRN
Qty: 18 G | Refills: 0 | Status: SHIPPED | OUTPATIENT
Start: 2023-08-18 | End: 2024-01-23

## 2023-09-01 ENCOUNTER — PRE VISIT (OUTPATIENT)
Dept: UROLOGY | Facility: CLINIC | Age: 63
End: 2023-09-01
Payer: COMMERCIAL

## 2023-09-11 ENCOUNTER — ANCILLARY PROCEDURE (OUTPATIENT)
Dept: GENERAL RADIOLOGY | Facility: CLINIC | Age: 63
End: 2023-09-11
Attending: UROLOGY
Payer: COMMERCIAL

## 2023-09-11 DIAGNOSIS — N20.0 KIDNEY STONE: ICD-10-CM

## 2023-09-11 PROCEDURE — 74018 RADEX ABDOMEN 1 VIEW: CPT

## 2023-09-12 ENCOUNTER — VIRTUAL VISIT (OUTPATIENT)
Dept: UROLOGY | Facility: CLINIC | Age: 63
End: 2023-09-12
Payer: COMMERCIAL

## 2023-09-12 DIAGNOSIS — N20.0 KIDNEY STONE: Primary | ICD-10-CM

## 2023-09-12 PROCEDURE — 99213 OFFICE O/P EST LOW 20 MIN: CPT | Mod: 95 | Performed by: NURSE PRACTITIONER

## 2023-09-12 NOTE — PROGRESS NOTES
Virtual Visit Details    Type of service:  Video Visit   Originating Location (pt. Location): Home  Distant Location (provider location):  Off-site  Platform used for Video Visit: Terad[employer]    Video start time: 1:55 PM  Video end time: 2:00 PM    CC: Kidney stones    Assessment/Plan:  63 year old male with a history of kidney stones, s/p right-sided URS in January, with stones composed of CaOx. No obvious stones on updated KUB.   -He will follow up with me in one year with a CT obtained beforehand.    Nasima Castaneda CNP  Department of Urology      Subjective:   63 year old male who presents for follow up regarding kidney stones. He is s/p right-sided URS with Dr. Vanegas on 1/25/23 for treatment of a 7 mm UVJ stone and 3 mm upper pole renal stone, composed of CaOx. No visible stones on follow up KUB. At our follow up visit 6 months ago, we reviewed general recommendations for stone prevention.     He had a repeat KUB obtained yesterday, which showed no obvious stones in the kidneys, though evaluation somewhat limited due to overlying stool.     Currently doing well and denies any complaints. Continues to work on optimizing hydration.     Objective:     Exam:   Patient is a 63 year old male   No vital signs obtained due to virtual visit.  General Appearance: Well groomed, hygenic  Respiratory: No cough, no respiratory distress or labored breathing  Musculoskeletal: Grossly normal with no gross deficits  Skin: No discoloration or apparent rashes  Neurologic: No tremors  Psychiatric: Alert and oriented  Further examination is deferred due to the nature of our visit.

## 2023-09-12 NOTE — LETTER
9/12/2023       RE: Bladimir Lezama  [address]  [facility] [address]     Dear Colleague,    Thank you for referring your patient, Bladimir Lezama, to the [facility] [place] at [facility]. Please see a copy of my visit note below.    Virtual Visit Details    Type of service:  Video Visit   Originating Location (pt. Location): Home  Distant Location (provider location):  Off-site  Platform used for Video Visit: [employer]jras[employer]    Video start time: 1:55 PM  Video end time: 2:00 PM    CC: Kidney stones    Assessment/Plan:  63 year old male with a history of kidney stones, s/p right-sided URS in January, with stones composed of CaOx. No obvious stones on updated KUB.   -He will follow up with me in one year with a CT obtained beforehand.    Nasima Castaneda CNP  Department of Urology      Subjective:   63 year old male who presents for follow up regarding kidney stones. He is s/p right-sided URS with Dr. Vanegas on 1/25/23 for treatment of a 7 mm UVJ stone and 3 mm upper pole renal stone, composed of CaOx. No visible stones on follow up KUB. At our follow up visit 6 months ago, we reviewed general recommendations for stone prevention.     He had a repeat KUB obtained yesterday, which showed no obvious stones in the kidneys, though evaluation somewhat limited due to overlying stool.     Currently doing well and denies any complaints. Continues to work on optimizing hydration.     Objective:     Exam:   Patient is a 63 year old male   No vital signs obtained due to virtual visit.  General Appearance: Well groomed, hygenic  Respiratory: No cough, no respiratory distress or labored breathing  Musculoskeletal: Grossly normal with no gross deficits  Skin: No discoloration or apparent rashes  Neurologic: No tremors  Psychiatric: Alert and oriented  Further examination is deferred due to the nature of our visit.

## 2023-09-12 NOTE — PATIENT INSTRUCTIONS
UROLOGY CLINIC VISIT PATIENT INSTRUCTIONS    -Follow up with me in one year with a CT scan obtained beforehand.     If you have any issues, questions or concerns in the meantime, do not hesitate to contact us at [phone] or via [employer].     Nasima Castaneda, CNP  Department of Urology

## 2023-09-12 NOTE — NURSING NOTE
Is the patient currently in the state of [place]? YES    Visit mode:VIDEO    If the visit is dropped, the patient can be reconnected by: VIDEO VISIT: Text to cell phone:   Telephone Information:   Mobile [phone]       Will anyone else be joining the visit? NO  (If patient encounters technical issues they should call [phone]    How would you like to obtain your AVS? MyChart    Are changes needed to the allergy or medication list? No    Reason for visit: Follow Up    Amaya SINGHF

## 2023-11-14 ASSESSMENT — ASTHMA QUESTIONNAIRES: ACT_TOTALSCORE: 23

## 2023-11-18 ENCOUNTER — E-VISIT (OUTPATIENT)
Dept: URGENT CARE | Facility: CLINIC | Age: 63
End: 2023-11-18
Payer: COMMERCIAL

## 2023-11-18 DIAGNOSIS — R05.9 COUGH, UNSPECIFIED TYPE: Primary | ICD-10-CM

## 2023-11-18 PROCEDURE — 99207 PR NON-BILLABLE SERV PER CHARTING: CPT | Performed by: FAMILY MEDICINE

## 2023-11-18 NOTE — PATIENT INSTRUCTIONS
Dear Bladimir Lezama,    We are sorry you are not feeling well. Based on the responses you provided, it is recommended that you be seen in-person in urgent care so we can better evaluate your symptoms. Please click here to find the nearest urgent care location to you.   You will not be charged for this Visit. Thank you for trusting us with your care.    Muriel Garduno MD

## 2023-11-20 ENCOUNTER — OFFICE VISIT (OUTPATIENT)
Dept: FAMILY MEDICINE | Facility: CLINIC | Age: 63
End: 2023-11-20
Payer: COMMERCIAL

## 2023-11-20 VITALS
TEMPERATURE: 98.4 F | HEIGHT: 72 IN | HEART RATE: 101 BPM | BODY MASS INDEX: 36.64 KG/M2 | RESPIRATION RATE: 21 BRPM | WEIGHT: 270.5 LBS | OXYGEN SATURATION: 96 % | SYSTOLIC BLOOD PRESSURE: 130 MMHG | DIASTOLIC BLOOD PRESSURE: 66 MMHG

## 2023-11-20 DIAGNOSIS — L91.8 ACROCHORDON: Primary | ICD-10-CM

## 2023-11-20 PROCEDURE — 99213 OFFICE O/P EST LOW 20 MIN: CPT

## 2023-11-20 ASSESSMENT — PAIN SCALES - GENERAL: PAINLEVEL: NO PAIN (0)

## 2023-11-20 NOTE — PROGRESS NOTES
"  Assessment & Plan     Acrochordon  - Adult Dermatology  Referral; Future  We applied cryotherapy to the larger acrochordon noted in exam on the buttocks.  Patient has an issue with recurring skin tags in the groin over years, and he does have some skin chafing and papules that could indicate early formation of more acrochordon.  I provided patient counseled that we may see acrochordon falloff after cryotherapy treatment, and I would like him to follow-up with dermatology for the presence of multiple skin tag issues on his groin.    Roddy Gomes NP  [facility] Garrison is a 63 year old, presenting for the following health issues:  Patient has had skin tags for a long time: coming and going in the groin area.  No pain or itching.  Skin Tags      11/20/2023     8:45 AM   Additional Questions   Roomed by Etta Lafleur       History of Present Illness       Reason for visit:  Remove skin tags    He eats 2-3 servings of fruits and vegetables daily.He consumes 0 sweetened beverage(s) daily.He exercises with enough effort to increase his heart rate 9 or less minutes per day.  He exercises with enough effort to increase his heart rate 3 or less days per week.   He is taking medications regularly.        Review of Systems   Constitutional, HEENT, cardiovascular, pulmonary, gi and gu systems are negative, except as otherwise noted.      Objective    /66   Pulse 101   Temp 98.4  F (36.9  C) (Temporal)   Resp 21   Ht 1.819 m (5' 11.61\")   Wt 122.7 kg (270 lb 8 oz)   SpO2 96%   BMI 37.08 kg/m    Body mass index is 37.08 kg/m .  Physical Exam   GENERAL: healthy, alert and no distress  SKIN: Multiple acrochordon noted on bilateral buttocks-8 on the left and 6 on the right.  Close to the anus, there is some skin erythema and <1 mm papules that could indicate early formation of acrochordon.              "

## 2023-11-25 ENCOUNTER — HOSPITAL ENCOUNTER (EMERGENCY)
Facility: CLINIC | Age: 63
Discharge: HOME | End: 2023-11-25
Attending: EMERGENCY MEDICINE | Admitting: EMERGENCY MEDICINE
Payer: COMMERCIAL

## 2023-11-25 ENCOUNTER — APPOINTMENT (OUTPATIENT)
Dept: ULTRASOUND IMAGING | Facility: CLINIC | Age: 63
End: 2023-11-25
Attending: EMERGENCY MEDICINE
Payer: COMMERCIAL

## 2023-11-25 VITALS
HEART RATE: 106 BPM | TEMPERATURE: 97.1 F | DIASTOLIC BLOOD PRESSURE: 93 MMHG | SYSTOLIC BLOOD PRESSURE: 133 MMHG | OXYGEN SATURATION: 97 % | WEIGHT: 275 LBS | RESPIRATION RATE: 20 BRPM | BODY MASS INDEX: 36.45 KG/M2 | HEIGHT: 73 IN

## 2023-11-25 DIAGNOSIS — M79.651 PAIN OF RIGHT THIGH: Primary | ICD-10-CM

## 2023-11-25 PROCEDURE — 99284 EMERGENCY DEPT VISIT MOD MDM: CPT | Mod: 25

## 2023-11-25 PROCEDURE — 93971 EXTREMITY STUDY: CPT | Mod: RT

## 2023-11-25 ASSESSMENT — ACTIVITIES OF DAILY LIVING (ADL): ADLS_ACUITY_SCORE: 35

## 2023-11-25 NOTE — ED TRIAGE NOTES
Pt has area on thigh that is swollen     Triage Assessment (Adult)       Row Name 11/25/23 1132          Triage Assessment    Airway WDL WDL        Respiratory WDL    Respiratory WDL WDL        Skin Circulation/Temperature WDL    Skin Circulation/Temperature WDL WDL        Cardiac WDL    Cardiac WDL WDL        Peripheral/Neurovascular WDL    Peripheral Neurovascular WDL WDL        Cognitive/Neuro/Behavioral WDL    Cognitive/Neuro/Behavioral WDL WDL

## 2023-11-25 NOTE — DISCHARGE INSTRUCTIONS
No signs of DVT on your ultrasound.  Continue to use ice packs, ibuprofen or Tylenol for discomfort as needed.  You can also elevate the lower extremity to assist with any swelling that may or may not develop.

## 2023-11-25 NOTE — ED PROVIDER NOTES
History   Chief Complaint:  Leg Swelling     The history is provided by the patient.      Bladimir Lezama is a 63 year old male with a history of venous stasis dermatitis of both lower extremities who presents for evaluation of leg swelling. Bladimir reports slightly increased swelling and pain to his medial calf with chronic ecchymosis. He wants to ensure there are no issues as he is flying to [place] tomorrow. He denies knee pain, calf pain, ankle or foot pain, chest pain, or shortness of breath. No blood thinners use. No history of blood clots.    Independent Historian:   None - Patient Only    Medications:    albuterol (PROAIR HFA/PROVENTIL HFA/VENTOLIN HFA) 108 (90 Base) MCG/ACT inhaler  blood glucose (NO BRAND SPECIFIED) lancets standard  blood glucose (NO BRAND SPECIFIED) test strip  blood glucose monitoring (NO BRAND SPECIFIED) meter device kit  fluticasone (FLONASE) 50 MCG/ACT nasal spray  metFORMIN (GLUCOPHAGE XR) 500 MG 24 hr tablet  rosuvastatin (CRESTOR) 5 MG tablet  sildenafil (REVATIO) 20 MG tablet      Past Medical History:    Past Medical History:   Diagnosis Date    Erectile dysfunction, unspecified erectile dysfunction type 8/27/2019    Impaired fasting glucose 09/17/2014    Intermittent asthma     Nocturnal leg cramps 8/22/2022    Obesity 07/13/2012    Perennial allergic rhinitis     Type 2 diabetes mellitus (H) 06/10/2019    Ureteral stone     Venous stasis dermatitis of both lower extremities 6/26/2023     Past Surgical History:    Past Surgical History:   Procedure Laterality Date    APPENDECTOMY  1974    COLONOSCOPY  09/2011    2 hyperplastic polyps - no adenomas    COLONOSCOPY N/A 01/13/2022    Procedure: COLONOSCOPY, WITH POLYPECTOMY AND BIOPSY;  Surgeon: Fredis Maya MD;  Location:  GI    COMBINED CYSTOSCOPY, RETROGRADES, URETEROSCOPY, LASER HOLMIUM LITHOTRIPSY URETER(S), INSERT STENT Right 01/25/2023    Procedure: Cystoscopy, Right Ureteroscopy, Right Retrograde Pyelogram, Right  "Laser Lithotripsy, Right Ureteral Stent Exchange;  Surgeon: James Vanegas MD;  Location: [facility] [place]    TONSILLECTOMY  1970      Physical Exam   Patient Vitals for the past 24 hrs:   BP Temp Temp src Pulse Resp SpO2 Height Weight   11/25/23 1303 (!) 133/93 97.1  F (36.2  C) Temporal 106 20 97 % 1.854 m (6' 1\") 124.7 kg (275 lb)      General: Alert, appears well-developed and well-nourished. Cooperative.     In no acute distress  HEENT:  Head:  Atraumatic  Ears:  External ears are normal  Mouth/Throat:  Oropharynx is without erythema or exudate and mucous membranes are moist.   Eyes:   Conjunctivae normal and EOM are normal. No scleral icterus.  CV:  Normal rate, regular rhythm, normal heart sounds and radial pulses are 2+ and symmetric.  No murmur.  Resp:  Breath sounds are clear bilaterally    Non-labored, no retractions or accessory muscle use  MS:  Normal range of motion. No edema.    Normal strength in all 4 extremities.     There is a palpable bruise or vein to the medial right thigh.  Suspicious for varicose vein versus superficial thrombophlebitis versus DVT.  No evidence of cellulitis.  Distal CMS the right lower extremity intact.  Right knee appears normal, no pain.  Skin:  Warm and dry.  No rash or lesions noted.  Neuro:   Alert. Normal strength.  GCS: 15  Psych: Normal mood and affect.    Emergency Department Course     ECG results from 06/22/14   EKG 12 lead     Value    Interpretation ECG Click View Image link to view waveform and result     Imaging:  US Lower Extremity Venous Duplex Right   Final Result   IMPRESSION:   1.  No deep venous thrombosis in the right lower extremity.         Report per radiology    Laboratory:  Labs Ordered and Resulted from Time of ED Arrival to Time of ED Departure - No data to display     Procedures     Emergency Department Course & Assessments:       Interventions:  Medications - No data to display     Independent Interpretation (X-rays, CTs, rhythm " strip):  None    Consultations/Discussion of Management or Tests:       Social Determinants of Health affecting care:   None    Disposition:  The patient was discharged to home.     Impression & Plan    CMS Diagnoses: None    Medical Decision Making:  Patient is a 63-year-old male with a history of venous stasis dermatitis who presents with right thigh bump and concerns for DVT.  Ultrasound with no evidence of DVT.  Suspicion for varicose vein.  Will continue with Voltaren gel and elevation of the lower extremity as needed.  No indication for blood thinners with no evidence of DVT on ultrasound today.  Patient reassured by workup.  Discussed RICE therapy.  Follow-up with primary care as needed.  Discharged home.    Diagnosis:    ICD-10-CM    1. Pain of right thigh  M79.651            Discharge Medications:  Discharge Medication List as of 11/25/2023  3:15 PM         11/25/2023   Ace Infante MD White, Scott, MD  11/25/23 1525

## 2023-12-16 ENCOUNTER — E-VISIT (OUTPATIENT)
Dept: URGENT CARE | Facility: CLINIC | Age: 63
End: 2023-12-16
Payer: COMMERCIAL

## 2023-12-16 DIAGNOSIS — R05.1 ACUTE COUGH: Primary | ICD-10-CM

## 2023-12-16 PROCEDURE — 99421 OL DIG E/M SVC 5-10 MIN: CPT | Performed by: FAMILY MEDICINE

## 2023-12-16 RX ORDER — BENZONATATE 200 MG/1
200 CAPSULE ORAL 3 TIMES DAILY PRN
Qty: 21 CAPSULE | Refills: 0 | Status: SHIPPED | OUTPATIENT
Start: 2023-12-16 | End: 2024-01-21

## 2023-12-16 NOTE — PATIENT INSTRUCTIONS
Thank you for choosing us for your care. I have placed an order for a prescription so that you can start treatment. View your full visit summary for details by clicking on the link below. Your pharmacist will able to address any questions you may have about the medication.     If you're not feeling better within 5-7 days, please schedule an appointment.  You can schedule an appointment right here in [facility], or call [phone]  If the visit is for the same symptoms as your eVisit, we'll refund the cost of your eVisit if seen within seven days.

## 2023-12-28 ENCOUNTER — E-VISIT (OUTPATIENT)
Dept: URGENT CARE | Facility: CLINIC | Age: 63
End: 2023-12-28
Payer: COMMERCIAL

## 2023-12-28 DIAGNOSIS — R05.8 COUGH PRESENT FOR GREATER THAN 3 WEEKS: Primary | ICD-10-CM

## 2023-12-28 PROCEDURE — 99207 PR NON-BILLABLE SERV PER CHARTING: CPT | Performed by: PHYSICIAN ASSISTANT

## 2023-12-28 NOTE — PATIENT INSTRUCTIONS
Dear Bladimir Lezama,    We are sorry you are not feeling well. Based on the responses you provided, it is recommended that you be seen in-person in urgent care so we can better evaluate your symptoms. Please click here to find the nearest urgent care location to you.   You will not be charged for this Visit. Thank you for trusting us with your care.    Belen Navas PA-C

## 2023-12-29 ENCOUNTER — ANCILLARY PROCEDURE (OUTPATIENT)
Dept: GENERAL RADIOLOGY | Facility: CLINIC | Age: 63
End: 2023-12-29
Attending: PHYSICIAN ASSISTANT
Payer: COMMERCIAL

## 2023-12-29 ENCOUNTER — OFFICE VISIT (OUTPATIENT)
Dept: URGENT CARE | Facility: URGENT CARE | Age: 63
End: 2023-12-29
Payer: COMMERCIAL

## 2023-12-29 VITALS
OXYGEN SATURATION: 96 % | SYSTOLIC BLOOD PRESSURE: 108 MMHG | HEART RATE: 87 BPM | DIASTOLIC BLOOD PRESSURE: 81 MMHG | TEMPERATURE: 97.7 F

## 2023-12-29 DIAGNOSIS — R05.2 SUBACUTE COUGH: ICD-10-CM

## 2023-12-29 DIAGNOSIS — J45.21 MILD INTERMITTENT ASTHMA WITH ACUTE EXACERBATION: ICD-10-CM

## 2023-12-29 DIAGNOSIS — R05.2 SUBACUTE COUGH: Primary | ICD-10-CM

## 2023-12-29 PROCEDURE — 71046 X-RAY EXAM CHEST 2 VIEWS: CPT | Mod: TC | Performed by: RADIOLOGY

## 2023-12-29 PROCEDURE — 99214 OFFICE O/P EST MOD 30 MIN: CPT | Performed by: PHYSICIAN ASSISTANT

## 2023-12-29 RX ORDER — BENZONATATE 200 MG/1
200 CAPSULE ORAL 3 TIMES DAILY PRN
Qty: 30 CAPSULE | Refills: 0 | Status: SHIPPED | OUTPATIENT
Start: 2023-12-29 | End: 2024-01-23

## 2023-12-29 RX ORDER — PREDNISONE 20 MG/1
40 TABLET ORAL DAILY
Qty: 10 TABLET | Refills: 0 | Status: SHIPPED | OUTPATIENT
Start: 2023-12-29 | End: 2024-01-03

## 2023-12-29 ASSESSMENT — ENCOUNTER SYMPTOMS
SINUS PRESSURE: 0
RHINORRHEA: 1
ABDOMINAL PAIN: 0
COUGH: 1
FEVER: 0
WHEEZING: 1
SINUS PAIN: 0

## 2023-12-29 NOTE — PROGRESS NOTES
Assessment & Plan:        ICD-10-CM    1. Subacute cough  R05.2 XR Chest 2 Views     benzonatate (TESSALON) 200 MG capsule      2. Mild intermittent asthma with acute exacerbation  J45.21 XR Chest 2 Views     predniSONE (DELTASONE) 20 MG tablet     fluticasone (FLOVENT DISKUS) 100 MCG/ACT inhaler            Plan/Clinical Decision Making:    Patient presents with subacute cough for 8 weeks. Has had clear sputum with ongoing wheezing. Albuterol has been helping, but persistent cough with recurrent wheezing.   I independently visualized the xray:  no infiltrate seen or area of opacity.   Will start patient on course of prednisone. Due to ongoing symptoms will start Flovent inhaler on daily basis.     Follow-up with PCP in not improving in 1-2 weeks.     At the end of the encounter, I discussed results, diagnosis, medications. Discussed red flags for immediate return to clinic/ER, as well as indications for follow up if no improvement. Patient understood and agreed to plan. Patient was stable for discharge.        Kayla Banegas PA-C on 12/29/2023 at 4:33 PM          Subjective:     HPI:    Bladimir is a 63 year old male who presents to clinic today for the following health issues:  Chief Complaint   Patient presents with    Cough     Negative at home covid test 3 wks ago. Completed ABX treatment. Cough remains with less mucus production.     HPI    Patient complains of cough for 8 weeks. Treated on 11/18/23 with Augmentin outside [place].  [facility]   Coughing up phlegm. Mostly clear.  Has had mild wheezing, inhaler helps with wheezing.   Negative covid test.  No reflux.     Review of Systems   Constitutional:  Negative for fever.   HENT:  Positive for congestion (mild) and rhinorrhea. Negative for sinus pressure, sinus pain and sneezing.    Respiratory:  Positive for cough and wheezing (mild, intermittent).    Gastrointestinal:  Negative for abdominal pain.         Patient  Active Problem List   Diagnosis    Perennial allergic rhinitis    Type 2 diabetes mellitus (H)    Erectile dysfunction, unspecified erectile dysfunction type    Nocturnal leg cramps    BMI 35.0-39.9 with comorbidity (H)    Ureteral stone    Venous stasis dermatitis of both lower extremities    Chronic venous insufficiency of lower extremity        Past Medical History:   Diagnosis Date    Erectile dysfunction, unspecified erectile dysfunction type 8/27/2019    Impaired fasting glucose 09/17/2014    Glucose 101 9/16/2014      Intermittent asthma     seasonal    Nocturnal leg cramps 8/22/2022    Obesity 07/13/2012    Perennial allergic rhinitis     Type 2 diabetes mellitus (H) 06/10/2019    Ureteral stone     Venous stasis dermatitis of both lower extremities 6/26/2023       Social History     Tobacco Use    Smoking status: Never    Smokeless tobacco: Never   Substance Use Topics    Alcohol use: Yes     Comment: 1 glass of wine every few days             Objective:     Vitals:    12/29/23 1604   BP: 108/81   Pulse: 87   Temp: 97.7  F (36.5  C)   TempSrc: Tympanic   SpO2: 96%         Physical Exam   EXAM:   Pleasant, alert, appropriate appearance. NAD.  Head Exam: Normocephalic, atraumatic.  Eye Exam: non icteric/injection.    Ear Exam: TMs grey without bulging. Normal canals.  Normal pinna.  Nose Exam: Normal external nose.    OroPharynx Exam:  Moist mucous membranes. No erythema, pharynx without exudate or hypertrophy.  Neck/Thyroid Exam:  No LAD.   Chest/Respiratory Exam: bilateral wheezing  Cardiovascular Exam: RRR.   Results:  Results for orders placed or performed in visit on 12/29/23   XR Chest 2 Views     Status: None    Narrative    EXAM: XR CHEST 2 VIEWS  LOCATION: [facility]  DATE: 12/29/2023    INDICATION:  Subacute cough, Mild intermittent asthma with acute exacerbation.  COMPARISON: 02/06/2015.      Impression    IMPRESSION: Negative chest. Lungs are clear. Normal heart size.

## 2024-01-23 ENCOUNTER — OFFICE VISIT (OUTPATIENT)
Dept: FAMILY MEDICINE | Facility: CLINIC | Age: 64
End: 2024-01-23
Payer: COMMERCIAL

## 2024-01-23 VITALS
SYSTOLIC BLOOD PRESSURE: 138 MMHG | HEIGHT: 73 IN | HEART RATE: 94 BPM | OXYGEN SATURATION: 98 % | BODY MASS INDEX: 35.31 KG/M2 | DIASTOLIC BLOOD PRESSURE: 72 MMHG | TEMPERATURE: 98.6 F | WEIGHT: 266.4 LBS | RESPIRATION RATE: 21 BRPM

## 2024-01-23 DIAGNOSIS — J45.20 MILD INTERMITTENT ASTHMA WITHOUT COMPLICATION: ICD-10-CM

## 2024-01-23 DIAGNOSIS — E66.01 SEVERE OBESITY (BMI 35.0-39.9) WITH COMORBIDITY (H): ICD-10-CM

## 2024-01-23 DIAGNOSIS — E11.65 TYPE 2 DIABETES MELLITUS WITH HYPERGLYCEMIA, WITHOUT LONG-TERM CURRENT USE OF INSULIN (H): Primary | ICD-10-CM

## 2024-01-23 DIAGNOSIS — Z23 NEED FOR VACCINATION: ICD-10-CM

## 2024-01-23 LAB — HBA1C MFR BLD: 8.3 % (ref 0–5.6)

## 2024-01-23 PROCEDURE — 90686 IIV4 VACC NO PRSV 0.5 ML IM: CPT | Performed by: FAMILY MEDICINE

## 2024-01-23 PROCEDURE — 90471 IMMUNIZATION ADMIN: CPT | Performed by: FAMILY MEDICINE

## 2024-01-23 PROCEDURE — 83036 HEMOGLOBIN GLYCOSYLATED A1C: CPT | Performed by: FAMILY MEDICINE

## 2024-01-23 PROCEDURE — 99214 OFFICE O/P EST MOD 30 MIN: CPT | Mod: 25 | Performed by: FAMILY MEDICINE

## 2024-01-23 PROCEDURE — 90472 IMMUNIZATION ADMIN EACH ADD: CPT | Performed by: FAMILY MEDICINE

## 2024-01-23 PROCEDURE — 90750 HZV VACC RECOMBINANT IM: CPT | Performed by: FAMILY MEDICINE

## 2024-01-23 PROCEDURE — 36415 COLL VENOUS BLD VENIPUNCTURE: CPT | Performed by: FAMILY MEDICINE

## 2024-01-23 RX ORDER — ALBUTEROL SULFATE 90 UG/1
2 AEROSOL, METERED RESPIRATORY (INHALATION) EVERY 6 HOURS PRN
Qty: 18 G | Refills: 0 | Status: SHIPPED | OUTPATIENT
Start: 2024-01-23 | End: 2024-02-14

## 2024-01-23 RX ORDER — METFORMIN HCL 500 MG
1000 TABLET, EXTENDED RELEASE 24 HR ORAL 2 TIMES DAILY WITH MEALS
Qty: 360 TABLET | Refills: 1 | Status: SHIPPED | OUTPATIENT
Start: 2024-01-23 | End: 2024-04-23

## 2024-01-23 ASSESSMENT — PAIN SCALES - GENERAL: PAINLEVEL: NO PAIN (0)

## 2024-01-23 NOTE — PROGRESS NOTES
Assessment & Plan     Type 2 diabetes mellitus with hyperglycemia, without long-term current use of insulin (H)  Uncontrolled.  While the recent ICS and prednisone caused acute hyperglycemia, we discussed that his elevated A1c reflects more long-term hyperglycemia.  I recommend that he increase the metformin dose to 1000 mg BID.  We'll plan to have him return in 3-4 months for a diabetes recheck on that.  - HEMOGLOBIN A1C  - metFORMIN (GLUCOPHAGE XR) 500 MG 24 hr tablet  Dispense: 360 tablet; Refill: 1    BMI 35.0-39.9 with comorbidity (H)  Severe obesity contributing to chronic conditions including T2DM.  Weight is up from a year ago but has decreased over the last few months.      Mild intermittent asthma without complication  stable/adequately controlled - Continue current medication regimen. Discussed that he can keep the remaining Flovent on hand and can start using that at onset of URI.  - albuterol (PROAIR HFA/PROVENTIL HFA/VENTOLIN HFA) 108 (90 Base) MCG/ACT inhaler  Dispense: 18 g; Refill: 0    Need for vaccination  - ZOSTER RECOMBINANT ADJUVANTED (SHINGRIX)  - INFLUENZA VACCINE >6 MONTHS (AFLURIA/FLUZONE)        Follow-up Visit   Expected date:  Apr 23, 2024 (Approximate)      Follow Up Appointment Details:     Follow-up with whom?: Me    Follow-Up for what?: Chronic Disease f/u    Chronic Disease f/u: Diabetes    How?: In Person                      Clinton Garrison is a 63 year old, presenting for the following health issues:  Diabetes        1/23/2024     8:25 AM   Additional Questions   Roomed by ISABELLA Carter   Accompanied by self         1/23/2024     8:25 AM   Patient Reported Additional Medications   Patient reports taking the following new medications none     History of Present Illness       He eats 4 or more servings of fruits and vegetables daily.He consumes 0 sweetened beverage(s) daily.He exercises with enough effort to increase his heart rate 10 to 19 minutes per day.  He exercises with  "enough effort to increase his heart rate 5 days per week.   He is taking medications regularly.         Diabetes Follow-up  Had cough for past 2 months.  Completed a 5-day course of prednisone at the end of December and used Flovent Diskus for about 10 days.  He stopped it because his SMBG readings were up to 300. Today it was 120.    How often are you checking your blood sugar? One time daily  Have you had any blood sugars above 200?  Yes   Have you had any blood sugars below 70?  No  What concerns do you have today about your diabetes? None and Blood sugar is often over 200   Do you have any of these symptoms? (Select all that apply)  No numbness or tingling in feet.  No redness, sores or blisters on feet.  No complaints of excessive thirst.  No reports of blurry vision.  No significant changes to weight.      BP Readings from Last 2 Encounters:   01/23/24 138/72   12/29/23 108/81     Hemoglobin A1C (%)   Date Value   01/23/2024 8.3 (H)   06/26/2023 6.7 (H)   05/25/2021 5.7 (H)   08/24/2020 5.5     LDL Cholesterol Calculated (mg/dL)   Date Value   06/26/2023 54   03/24/2022 41   08/24/2020 84   11/25/2019 120 (H)         Asthma Follow-Up  With recent exacerbation after URI - treated with prednisone and inhaled corticosteroid (Flovent).  Improved but not resolved.  Was ACT completed today?  No        11/13/2023    11:53 AM   ACT Total Scores   ACT TOTAL SCORE (Goal Greater than or Equal to 20) 23   In the past 12 months, how many times did you visit the emergency room for your asthma without being admitted to the hospital? 0   In the past 12 months, how many times were you hospitalized overnight because of your asthma? 0              Objective    /72 (BP Location: Right arm, Patient Position: Sitting, Cuff Size: Adult Regular)   Pulse 94   Temp 98.6  F (37  C) (Temporal)   Resp 21   Ht 1.854 m (6' 1\")   Wt 120.8 kg (266 lb 6.4 oz)   SpO2 98%   BMI 35.15 kg/m    Body mass index is 35.15 kg/m .  Physical " Exam   GENERAL: healthy, alert and no distress  EYES: Eyes grossly normal to inspection, conjunctivae and sclerae normal  RESP: lungs clear to auscultation - no rales, rhonchi or wheezes  CV: regular rate and rhythm, normal S1 S2, no S3 or S4, no murmur, click or rub, no peripheral edema    Results for orders placed or performed in visit on 01/23/24   HEMOGLOBIN A1C     Status: Abnormal   Result Value Ref Range    Hemoglobin A1C 8.3 (H) 0.0 - 5.6 %           Signed Electronically by: Radha Zhang MD

## 2024-01-23 NOTE — NURSING NOTE
Prior to immunization administration, verified patients identity using patient s name and date of birth. Please see Immunization Activity for additional information.     Screening Questionnaire for Adult Immunization    Are you sick today?   No   Do you have allergies to medications, food, a vaccine component or latex?   No   Have you ever had a serious reaction after receiving a vaccination?   No   Do you have a long-term health problem with heart, lung, kidney, or metabolic disease (e.g., diabetes), asthma, a blood disorder, no spleen, complement component deficiency, a cochlear implant, or a spinal fluid leak?  Are you on long-term aspirin therapy?   Yes   Do you have cancer, leukemia, HIV/AIDS, or any other immune system problem?   Yes   Do you have a parent, brother, or sister with an immune system problem?   No   In the past 3 months, have you taken medications that affect  your immune system, such as prednisone, other steroids, or anticancer drugs; drugs for the treatment of rheumatoid arthritis, Crohn s disease, or psoriasis; or have you had radiation treatments?   Yes   Have you had a seizure, or a brain or other nervous system problem?   No   During the past year, have you received a transfusion of blood or blood    products, or been given immune (gamma) globulin or antiviral drug?   No   For women: Are you pregnant or is there a chance you could become       pregnant during the next month?   No   Have you received any vaccinations in the past 4 weeks?   No     Immunization questionnaire was positive for at least one answer.  Notified sajan.      Patient instructed to remain in clinic for 15 minutes afterwards, and to report any adverse reactions.     Screening performed by Joanne Quiroz MA on 1/23/2024 at 9:18 AM.

## 2024-01-26 ENCOUNTER — MYC MEDICAL ADVICE (OUTPATIENT)
Dept: FAMILY MEDICINE | Facility: CLINIC | Age: 64
End: 2024-01-26
Payer: COMMERCIAL

## 2024-01-30 NOTE — TELEPHONE ENCOUNTER
Message sent via nVoq.    Oliva Goff, RN, BSN, PHN  [facility]  [phone]

## 2024-02-14 DIAGNOSIS — J45.20 MILD INTERMITTENT ASTHMA WITHOUT COMPLICATION: ICD-10-CM

## 2024-02-14 RX ORDER — ALBUTEROL SULFATE 90 UG/1
AEROSOL, METERED RESPIRATORY (INHALATION)
Qty: 18 G | Refills: 0 | Status: SHIPPED | OUTPATIENT
Start: 2024-02-14 | End: 2024-03-08

## 2024-03-08 DIAGNOSIS — J45.20 MILD INTERMITTENT ASTHMA WITHOUT COMPLICATION: ICD-10-CM

## 2024-03-08 RX ORDER — ALBUTEROL SULFATE 90 UG/1
AEROSOL, METERED RESPIRATORY (INHALATION)
Qty: 18 G | Refills: 0 | Status: SHIPPED | OUTPATIENT
Start: 2024-03-08

## 2024-03-11 ENCOUNTER — MYC MEDICAL ADVICE (OUTPATIENT)
Dept: FAMILY MEDICINE | Facility: CLINIC | Age: 64
End: 2024-03-11
Payer: COMMERCIAL

## 2024-03-20 ENCOUNTER — MYC MEDICAL ADVICE (OUTPATIENT)
Dept: FAMILY MEDICINE | Facility: CLINIC | Age: 64
End: 2024-03-20
Payer: COMMERCIAL

## 2024-03-21 ENCOUNTER — E-VISIT (OUTPATIENT)
Dept: FAMILY MEDICINE | Facility: CLINIC | Age: 64
End: 2024-03-21
Payer: COMMERCIAL

## 2024-03-21 ENCOUNTER — MYC MEDICAL ADVICE (OUTPATIENT)
Dept: UROLOGY | Facility: CLINIC | Age: 64
End: 2024-03-21

## 2024-03-21 DIAGNOSIS — M25.50 ARTHRALGIA, UNSPECIFIED JOINT: Primary | ICD-10-CM

## 2024-03-21 PROCEDURE — 99421 OL DIG E/M SVC 5-10 MIN: CPT | Performed by: FAMILY MEDICINE

## 2024-03-22 NOTE — PATIENT INSTRUCTIONS
Thank you for choosing us for your care. I have placed an order for a uric acid test and you can schedule an appointment with the lab right here in [facility], or call [phone].  I will let you know when the results are back and next steps to take.    However, please note this is not what I would recommend to evaluate your symptoms.  My recommendation is that if you think you are having an acute gout attack (which is the acute arthritis caused by elevated uric acid level) that you be seen in clinic.  In fact, most people with acute gout have so much pain they would not be just requesting a uric acid level.  If you need to be seen for acute joint pain please call the clinic and ask to speak to a [place] triage nurse.    Also, keep in mind that the interpretation of the uric acid level is dependent on whether you are symptomatic or asymptomatic.      Radha Zhang MD  [facility]

## 2024-03-23 ENCOUNTER — LAB (OUTPATIENT)
Dept: LAB | Facility: CLINIC | Age: 64
End: 2024-03-23
Payer: COMMERCIAL

## 2024-03-23 DIAGNOSIS — M25.50 ARTHRALGIA, UNSPECIFIED JOINT: ICD-10-CM

## 2024-03-23 LAB — URATE SERPL-MCNC: 7.6 MG/DL (ref 3.4–7)

## 2024-03-23 PROCEDURE — 84550 ASSAY OF BLOOD/URIC ACID: CPT

## 2024-03-23 PROCEDURE — 36415 COLL VENOUS BLD VENIPUNCTURE: CPT

## 2024-03-24 NOTE — RESULT ENCOUNTER NOTE
Tono Garrison,  Your uric acid level is somewhat elevated.  This does put you at risk for uric acid kidney stones and gout.  You may want to start eating a low purine diet to see if you can reduce the uric acid level with dietary changes.  Dietary purines increase the uric acid level in the blood which can cause acute gout attacks (and also kidney stones).      Plan your meals and snacks around foods that are low in purines and are safe for you to eat. These foods include:    Green vegetables and tomatoes.    Fruits.    Whole-grain breads, rice, and cereals.    Eggs, peanut butter, and nuts.    Low-fat milk, cheese, and other milk products.    Popcorn.    Gelatin desserts, chocolate, cocoa, and cakes and sweets, in small amounts.      You can eat certain foods that are medium-high in purines, but eat them only once in a while. These foods include:    Legumes, such as dried beans and dried peas. You can have 1 cup cooked legumes each day.    Asparagus, cauliflower, spinach, mushrooms, and green peas.    Fish and seafood (other than very high-purine seafood).    Oatmeal, wheat bran, and wheat germ.    Limit very high-purine foods, including:    Organ meats, such as liver, kidneys, sweetbreads, and brains.    Meats, including don, beef, pork, and lamb.    Game meats and any other meats in large amounts.    Anchovies, sardines, herring, mackerel, and scallops.    Gravy.    Beer.       Staying well-hydrated is also important as it can help prevent kidney stones and acute gout attacks.    Radha Zhang MD

## 2024-03-26 DIAGNOSIS — E11.9 TYPE 2 DIABETES MELLITUS WITHOUT COMPLICATION, WITHOUT LONG-TERM CURRENT USE OF INSULIN (H): ICD-10-CM

## 2024-03-26 RX ORDER — ROSUVASTATIN CALCIUM 5 MG/1
5 TABLET, COATED ORAL EVERY EVENING
Qty: 90 TABLET | Refills: 3 | OUTPATIENT
Start: 2024-03-26

## 2024-03-29 ENCOUNTER — MYC MEDICAL ADVICE (OUTPATIENT)
Dept: FAMILY MEDICINE | Facility: CLINIC | Age: 64
End: 2024-03-29
Payer: COMMERCIAL

## 2024-04-01 NOTE — TELEPHONE ENCOUNTER
Message sent via ZUCHEM.    Oliva Goff, RN, BSN, PHN  [facility]  [phone]

## 2024-04-17 ENCOUNTER — VIRTUAL VISIT (OUTPATIENT)
Dept: FAMILY MEDICINE | Facility: CLINIC | Age: 64
End: 2024-04-17
Payer: COMMERCIAL

## 2024-04-17 DIAGNOSIS — Z87.39 HX OF ACUTE GOUTY ARTHRITIS: Primary | ICD-10-CM

## 2024-04-17 DIAGNOSIS — E79.0 HYPERURICEMIA: ICD-10-CM

## 2024-04-17 DIAGNOSIS — R20.0 NUMBNESS OF FINGER: ICD-10-CM

## 2024-04-17 PROBLEM — M10.9 ACUTE GOUTY ARTHRITIS: Status: ACTIVE | Noted: 2024-04-17

## 2024-04-17 PROCEDURE — G2211 COMPLEX E/M VISIT ADD ON: HCPCS | Mod: 95 | Performed by: FAMILY MEDICINE

## 2024-04-17 PROCEDURE — 99214 OFFICE O/P EST MOD 30 MIN: CPT | Mod: 95 | Performed by: FAMILY MEDICINE

## 2024-04-17 NOTE — PROGRESS NOTES
Bladimir is a 63 year old who is being evaluated via a billable video visit.    How would you like to obtain your AVS? MyChart  If the video visit is dropped, the invitation should be resent by: Text to cell phone: [phone]  Will anyone else be joining your video visit? No      Assessment & Plan     Hx of acute gouty arthritis  Hyperuricemia  Discussed pathophysiology and progressive nature of gout - that this is a chronic condition that flares up periodically and often increasingly so.  Discussed role of elevated uric acid levels and low-purine diet.  Discussed the difference between medications used to treat an acute gout attack vs medications used daily to prevent gout attacks.  I explained that allopurinol should not be used for acute gout symptoms as it can actually precipitate a gout attack.  Based on his milder symptoms I do not think he had a gout attack recently in [place] but if he has additional gout attacks we would then consider initiating allopurinol.  Discussed that with each acute episode of inflammatory arthritis there is joint destruction so the goal is to minimize the frequency of acute episodes.       Numbness of finger  We'll check B vitamin levels when I see him next month in clinic.          I spent a total of 34 minutes on the day of the visit.   Time spent by me doing chart review, history and exam, documentation and further activities per the note        The longitudinal plan of care for the diagnosis(es)/condition(s) as documented were addressed during this visit. Due to the added complexity in care, I will continue to support Bladimir in the subsequent management and with ongoing continuity of care.       Subjective   Bladimir is a 63 year old, presenting for the following health issues:  Diabetes and Pain (Tennis elbow/Uric acid)      4/17/2024     8:53 AM   Additional Questions   Roomed by Sapphire DONALD     Pain    History of Present Illness       Reason for visit:  Uric acid and pains  Symptom  onset:  More than a month  Symptoms include:  Tennis elbow and other  Symptom intensity:  Moderate  Symptom progression:  Staying the same  Had these symptoms before:  Yes  Has tried/received treatment for these symptoms:  Yes  Previous treatment was successful:  Yes  Prior treatment description:  Chiropractor    He eats 4 or more servings of fruits and vegetables daily.He consumes 0 sweetened beverage(s) daily.He exercises with enough effort to increase his heart rate 30 to 60 minutes per day.  He exercises with enough effort to increase his heart rate 3 or less days per week.   He is taking medications regularly.     DM - Increased metformin in Jan and his blood sugars seemed more erratic so he started spacing out the doses.  He is now taking metformin to 1 tab in the morning and 2 tabs at lunch and one with dinner.  Also being more careful with diet.  BG's seem better.      Joint Pains - Has had intermittent joint pains x years.  He had a gout attack in [place] 20+ years ago.  That consisted of severe focal pain in the outside of his foot.  He saw a doctor at that time and was told it was gout and was treated.  He's never had an episode of exquisite pain like that since.  In [place] he had bilateral foot pain that made walking uncomfortable and started taking allopurinol which is available OTC in [place].  We checked his uric acid level which was elevated.    Right elbow - some pain of lateral elbow associated with numbness on tip of pointer finger.  This is relatively new - past several weeks.  He's seeing chiroprator and getting adjustments for this.        Objective    Vitals - Patient Reported  Pain Score: No Pain (0)      Physical Exam   GENERAL: alert and no distress  EYES: Eyes grossly normal to inspection.  No discharge or erythema, or obvious scleral/conjunctival abnormalities.  RESP: No audible wheeze, cough, or visible cyanosis.    SKIN: Visible skin clear. No significant rash, abnormal pigmentation or  lesions.  NEURO: Cranial nerves grossly intact.  Mentation and speech appropriate for age.  PSYCH: Appropriate affect, tone, and pace of words    Lab on 03/23/2024   Component Date Value Ref Range Status    Uric Acid 03/23/2024 7.6 (H)  3.4 - 7.0 mg/dL Final         Video-Visit Details    Type of service:  Video Visit   9:12 AM  9:36 AM   Originating Location (pt. Location): Home  Distant Location (provider location):  Off-site  Platform used for Video Visit: Becky  Signed Electronically by: Radha Zhang MD

## 2024-04-20 DIAGNOSIS — E11.65 TYPE 2 DIABETES MELLITUS WITH HYPERGLYCEMIA, WITHOUT LONG-TERM CURRENT USE OF INSULIN (H): ICD-10-CM

## 2024-04-22 RX ORDER — METFORMIN HCL 500 MG
1000 TABLET, EXTENDED RELEASE 24 HR ORAL 2 TIMES DAILY WITH MEALS
Qty: 360 TABLET | Refills: 1 | OUTPATIENT
Start: 2024-04-22

## 2024-04-23 ENCOUNTER — OFFICE VISIT (OUTPATIENT)
Dept: FAMILY MEDICINE | Facility: CLINIC | Age: 64
End: 2024-04-23
Attending: FAMILY MEDICINE
Payer: COMMERCIAL

## 2024-04-23 VITALS
OXYGEN SATURATION: 97 % | SYSTOLIC BLOOD PRESSURE: 110 MMHG | HEART RATE: 88 BPM | TEMPERATURE: 97.2 F | RESPIRATION RATE: 20 BRPM | HEIGHT: 72 IN | BODY MASS INDEX: 35.33 KG/M2 | DIASTOLIC BLOOD PRESSURE: 70 MMHG | WEIGHT: 260.8 LBS

## 2024-04-23 DIAGNOSIS — R20.0 NUMBNESS OF FINGER: ICD-10-CM

## 2024-04-23 DIAGNOSIS — J45.20 MILD INTERMITTENT ASTHMA WITHOUT COMPLICATION: ICD-10-CM

## 2024-04-23 DIAGNOSIS — E79.0 HYPERURICEMIA: ICD-10-CM

## 2024-04-23 DIAGNOSIS — E11.9 TYPE 2 DIABETES MELLITUS WITHOUT COMPLICATION, WITHOUT LONG-TERM CURRENT USE OF INSULIN (H): Primary | ICD-10-CM

## 2024-04-23 LAB — HBA1C MFR BLD: 6.9 % (ref 0–5.6)

## 2024-04-23 PROCEDURE — 99000 SPECIMEN HANDLING OFFICE-LAB: CPT | Performed by: FAMILY MEDICINE

## 2024-04-23 PROCEDURE — 36415 COLL VENOUS BLD VENIPUNCTURE: CPT | Performed by: FAMILY MEDICINE

## 2024-04-23 PROCEDURE — 99214 OFFICE O/P EST MOD 30 MIN: CPT | Performed by: FAMILY MEDICINE

## 2024-04-23 PROCEDURE — 83036 HEMOGLOBIN GLYCOSYLATED A1C: CPT | Performed by: FAMILY MEDICINE

## 2024-04-23 PROCEDURE — G2211 COMPLEX E/M VISIT ADD ON: HCPCS | Performed by: FAMILY MEDICINE

## 2024-04-23 PROCEDURE — 84425 ASSAY OF VITAMIN B-1: CPT | Mod: 90 | Performed by: FAMILY MEDICINE

## 2024-04-23 PROCEDURE — 84207 ASSAY OF VITAMIN B-6: CPT | Mod: 90 | Performed by: FAMILY MEDICINE

## 2024-04-23 PROCEDURE — 82607 VITAMIN B-12: CPT | Performed by: FAMILY MEDICINE

## 2024-04-23 RX ORDER — METFORMIN HCL 500 MG
TABLET, EXTENDED RELEASE 24 HR ORAL
Qty: 360 TABLET | Refills: 1 | Status: SHIPPED | OUTPATIENT
Start: 2024-04-23 | End: 2024-08-02

## 2024-04-23 RX ORDER — ROSUVASTATIN CALCIUM 5 MG/1
5 TABLET, COATED ORAL EVERY EVENING
Qty: 90 TABLET | Refills: 1 | Status: SHIPPED | OUTPATIENT
Start: 2024-04-23 | End: 2024-09-24

## 2024-04-23 ASSESSMENT — PAIN SCALES - GENERAL: PAINLEVEL: NO PAIN (0)

## 2024-04-23 NOTE — PROGRESS NOTES
Assessment & Plan     Type 2 diabetes mellitus without complication, without long-term current use of insulin (H)  Improved control.  Continue current medication regimen.   - Hemoglobin A1c  - metFORMIN (GLUCOPHAGE XR) 500 MG 24 hr tablet  Dispense: 360 tablet; Refill: 1  - rosuvastatin (CRESTOR) 5 MG tablet  Dispense: 90 tablet; Refill: 1    Numbness of finger  Likely peripheral neuropathy/impingement that is improving with chiropractic care and cessation of carrying heavy backpack. We will check B vit levels as well  - Vitamin B1 whole blood  - Vitamin B12  - Vitamin B6    Mild intermittent asthma without complication  stable/well controlled - Continue current medication regimen with albuterol PRN.    Hyperuricemia   With remote history of gout attack.  I gave him a handout on dietary strategies for both preventing gout attacks (low-purine diet) and during gout attacks (no meat or alcohol and 8-10 glass of water daily).        The longitudinal plan of care for the diagnosis(es)/condition(s) as documented were addressed during this visit. Due to the added complexity in care, I will continue to support Bladimir in the subsequent management and with ongoing continuity of care.               Subjective   Bladimir is a 63 year old, presenting for the following health issues:  Diabetes (Follow up)      4/23/2024     8:26 AM   Additional Questions   Roomed by Symone   Accompanied by alone         4/23/2024     8:26 AM   Patient Reported Additional Medications   Patient reports taking the following new medications none     History of Present Illness       Diabetes:   He presents for follow up of diabetes.  He is checking home blood glucose four or more times daily.   He checks blood glucose before and after meals and at bedtime.  Blood glucose is sometimes over 200 and never under 70.  When his blood glucose is low, the patient is asymptomatic for confusion, blurred vision, lethargy and reports not feeling dizzy, shaky, or  "weak.  He is concerned about other.    He is not experiencing numbness or burning in feet, excessive thirst, blurry vision, weight changes or redness, sores or blisters on feet.               Diabetes Follow-up  He has changed how he takes metformin - spreading the doses out throughout the day:   1 tab with breakfast, 2 with lunch, 1 with dinner.  He feels his BG's have improved with this regimen.      BP Readings from Last 2 Encounters:   04/23/24 110/70   01/23/24 138/72     Hemoglobin A1C (%)   Date Value   04/23/2024 6.9 (H)   01/23/2024 8.3 (H)   05/25/2021 5.7 (H)   08/24/2020 5.5     LDL Cholesterol Calculated (mg/dL)   Date Value   06/26/2023 54   03/24/2022 41   08/24/2020 84   11/25/2019 120 (H)         Asthma Follow-Up  He has a longstanding history of intermittent, seasonal asthma.   Was ACT completed today?  Yes        11/13/2023    11:53 AM   ACT Total Scores   ACT TOTAL SCORE (Goal Greater than or Equal to 20) 23   In the past 12 months, how many times did you visit the emergency room for your asthma without being admitted to the hospital? 0   In the past 12 months, how many times were you hospitalized overnight because of your asthma? 0        Numbness in fingertip  He's had pain behind the lateral epicondyle of the right elbow, radiating down radial aspect of forearm and associated with numbness in the tip of his index finger.  It also radiates into right posterior shoulder.  He noted this after carrying a heavy backpack and has stopped carrying that.  He just started seeing a chiropractor for this and the posterior shoulder and elbow pain are improved.  Still has mild numbness in right index finger.        Objective    /70 (BP Location: Right arm, Patient Position: Sitting)   Pulse 88   Temp 97.2  F (36.2  C) (Temporal)   Resp 20   Ht 1.825 m (5' 11.85\")   Wt 118.3 kg (260 lb 12.8 oz)   SpO2 97%   BMI 35.52 kg/m    Body mass index is 35.52 kg/m .  Physical Exam   GEN:  no apparent " distress  LUNGS:  normal respiratory effort, and lungs clear to auscultation bilaterally - no rales, rhonchi or wheezes  CV: regular rate and rhythm, normal S1 S2, no S3 or S4, and no murmur, click or rub       Results for orders placed or performed in visit on 04/23/24   Hemoglobin A1c     Status: Abnormal   Result Value Ref Range    Hemoglobin A1C 6.9 (H) 0.0 - 5.6 %           Signed Electronically by: Radha Zhang MD

## 2024-04-24 LAB — VIT B12 SERPL-MCNC: 474 PG/ML (ref 232–1245)

## 2024-04-28 LAB — PYRIDOXAL PHOS SERPL-SCNC: 40.9 NMOL/L

## 2024-04-30 LAB — VIT B1 PYROPHOSHATE BLD-SCNC: 112 NMOL/L

## 2024-06-15 ENCOUNTER — TRANSFERRED RECORDS (OUTPATIENT)
Dept: MULTI SPECIALTY CLINIC | Facility: CLINIC | Age: 64
End: 2024-06-15

## 2024-06-15 LAB — RETINOPATHY: NORMAL

## 2024-07-18 ENCOUNTER — PATIENT OUTREACH (OUTPATIENT)
Dept: CARE COORDINATION | Facility: CLINIC | Age: 64
End: 2024-07-18
Payer: COMMERCIAL

## 2024-08-02 ENCOUNTER — MYC REFILL (OUTPATIENT)
Dept: FAMILY MEDICINE | Facility: CLINIC | Age: 64
End: 2024-08-02
Payer: COMMERCIAL

## 2024-08-02 DIAGNOSIS — E11.9 TYPE 2 DIABETES MELLITUS WITHOUT COMPLICATION, WITHOUT LONG-TERM CURRENT USE OF INSULIN (H): ICD-10-CM

## 2024-08-02 RX ORDER — METFORMIN HCL 500 MG
TABLET, EXTENDED RELEASE 24 HR ORAL
Qty: 360 TABLET | Refills: 3 | Status: SHIPPED | OUTPATIENT
Start: 2024-08-02

## 2024-09-06 ENCOUNTER — MYC REFILL (OUTPATIENT)
Dept: FAMILY MEDICINE | Facility: CLINIC | Age: 64
End: 2024-09-06
Payer: COMMERCIAL

## 2024-09-06 DIAGNOSIS — E11.65 TYPE 2 DIABETES MELLITUS WITH HYPERGLYCEMIA, WITHOUT LONG-TERM CURRENT USE OF INSULIN (H): ICD-10-CM

## 2024-09-08 ENCOUNTER — HEALTH MAINTENANCE LETTER (OUTPATIENT)
Age: 64
End: 2024-09-08

## 2024-09-11 NOTE — TELEPHONE ENCOUNTER
Chatous message sent to patient.  Latia RAMIREZ BSN, PHN, RN  [facility]  [phone]

## 2024-09-18 ASSESSMENT — ASTHMA QUESTIONNAIRES
QUESTION_3 LAST FOUR WEEKS HOW OFTEN DID YOUR ASTHMA SYMPTOMS (WHEEZING, COUGHING, SHORTNESS OF BREATH, CHEST TIGHTNESS OR PAIN) WAKE YOU UP AT NIGHT OR EARLIER THAN USUAL IN THE MORNING: NOT AT ALL
ACT_TOTALSCORE: 25
QUESTION_1 LAST FOUR WEEKS HOW MUCH OF THE TIME DID YOUR ASTHMA KEEP YOU FROM GETTING AS MUCH DONE AT WORK, SCHOOL OR AT HOME: NONE OF THE TIME
QUESTION_5 LAST FOUR WEEKS HOW WOULD YOU RATE YOUR ASTHMA CONTROL: COMPLETELY CONTROLLED
ACT_TOTALSCORE: 25
QUESTION_2 LAST FOUR WEEKS HOW OFTEN HAVE YOU HAD SHORTNESS OF BREATH: NOT AT ALL
QUESTION_4 LAST FOUR WEEKS HOW OFTEN HAVE YOU USED YOUR RESCUE INHALER OR NEBULIZER MEDICATION (SUCH AS ALBUTEROL): NOT AT ALL

## 2024-09-23 ENCOUNTER — OFFICE VISIT (OUTPATIENT)
Dept: FAMILY MEDICINE | Facility: CLINIC | Age: 64
End: 2024-09-23
Payer: COMMERCIAL

## 2024-09-23 VITALS
HEIGHT: 72 IN | HEART RATE: 82 BPM | DIASTOLIC BLOOD PRESSURE: 78 MMHG | WEIGHT: 260 LBS | OXYGEN SATURATION: 95 % | BODY MASS INDEX: 35.21 KG/M2 | TEMPERATURE: 97.3 F | SYSTOLIC BLOOD PRESSURE: 113 MMHG | RESPIRATION RATE: 15 BRPM

## 2024-09-23 DIAGNOSIS — Z00.00 ROUTINE GENERAL MEDICAL EXAMINATION AT A HEALTH CARE FACILITY: Primary | ICD-10-CM

## 2024-09-23 DIAGNOSIS — E11.9 TYPE 2 DIABETES MELLITUS WITHOUT COMPLICATION, WITHOUT LONG-TERM CURRENT USE OF INSULIN (H): ICD-10-CM

## 2024-09-23 DIAGNOSIS — E79.0 HYPERURICEMIA: ICD-10-CM

## 2024-09-23 DIAGNOSIS — R82.90 MALODOROUS URINE: ICD-10-CM

## 2024-09-23 LAB
ALBUMIN UR-MCNC: NEGATIVE MG/DL
ANION GAP SERPL CALCULATED.3IONS-SCNC: 10 MMOL/L (ref 7–15)
APPEARANCE UR: CLEAR
BILIRUB UR QL STRIP: NEGATIVE
BUN SERPL-MCNC: 15.9 MG/DL (ref 8–23)
CALCIUM SERPL-MCNC: 9.1 MG/DL (ref 8.8–10.4)
CHLORIDE SERPL-SCNC: 104 MMOL/L (ref 98–107)
CHOLEST SERPL-MCNC: 102 MG/DL
COLOR UR AUTO: YELLOW
CREAT SERPL-MCNC: 0.94 MG/DL (ref 0.67–1.17)
CREAT UR-MCNC: 165 MG/DL
EGFRCR SERPLBLD CKD-EPI 2021: >90 ML/MIN/1.73M2
EST. AVERAGE GLUCOSE BLD GHB EST-MCNC: 166 MG/DL
FASTING STATUS PATIENT QL REPORTED: YES
FASTING STATUS PATIENT QL REPORTED: YES
GLUCOSE SERPL-MCNC: 145 MG/DL (ref 70–99)
GLUCOSE UR STRIP-MCNC: NEGATIVE MG/DL
HBA1C MFR BLD: 7.4 % (ref 0–5.6)
HCO3 SERPL-SCNC: 25 MMOL/L (ref 22–29)
HDLC SERPL-MCNC: 41 MG/DL
HGB UR QL STRIP: NEGATIVE
KETONES UR STRIP-MCNC: NEGATIVE MG/DL
LDLC SERPL CALC-MCNC: 44 MG/DL
LEUKOCYTE ESTERASE UR QL STRIP: NEGATIVE
MICROALBUMIN UR-MCNC: <12 MG/L
MICROALBUMIN/CREAT UR: NORMAL MG/G{CREAT}
NITRATE UR QL: NEGATIVE
NONHDLC SERPL-MCNC: 61 MG/DL
PH UR STRIP: 5.5 [PH] (ref 5–7)
POTASSIUM SERPL-SCNC: 4.3 MMOL/L (ref 3.4–5.3)
SODIUM SERPL-SCNC: 139 MMOL/L (ref 135–145)
SP GR UR STRIP: >=1.03 (ref 1–1.03)
TRIGL SERPL-MCNC: 85 MG/DL
URATE SERPL-MCNC: 6.8 MG/DL (ref 3.4–7)
UROBILINOGEN UR STRIP-ACNC: 0.2 E.U./DL

## 2024-09-23 PROCEDURE — 84550 ASSAY OF BLOOD/URIC ACID: CPT | Performed by: FAMILY MEDICINE

## 2024-09-23 PROCEDURE — 99396 PREV VISIT EST AGE 40-64: CPT | Performed by: FAMILY MEDICINE

## 2024-09-23 PROCEDURE — 81003 URINALYSIS AUTO W/O SCOPE: CPT | Performed by: FAMILY MEDICINE

## 2024-09-23 PROCEDURE — 80048 BASIC METABOLIC PNL TOTAL CA: CPT | Performed by: FAMILY MEDICINE

## 2024-09-23 PROCEDURE — 36415 COLL VENOUS BLD VENIPUNCTURE: CPT | Performed by: FAMILY MEDICINE

## 2024-09-23 PROCEDURE — 80061 LIPID PANEL: CPT | Performed by: FAMILY MEDICINE

## 2024-09-23 PROCEDURE — 82570 ASSAY OF URINE CREATININE: CPT | Performed by: FAMILY MEDICINE

## 2024-09-23 PROCEDURE — 82043 UR ALBUMIN QUANTITATIVE: CPT | Performed by: FAMILY MEDICINE

## 2024-09-23 PROCEDURE — 99207 PR FOOT EXAM NO CHARGE: CPT | Performed by: FAMILY MEDICINE

## 2024-09-23 PROCEDURE — 83036 HEMOGLOBIN GLYCOSYLATED A1C: CPT | Performed by: FAMILY MEDICINE

## 2024-09-23 PROCEDURE — 99214 OFFICE O/P EST MOD 30 MIN: CPT | Mod: 25 | Performed by: FAMILY MEDICINE

## 2024-09-23 RX ORDER — ROSUVASTATIN CALCIUM 5 MG/1
5 TABLET, COATED ORAL EVERY EVENING
Qty: 90 TABLET | Refills: 1 | Status: CANCELLED | OUTPATIENT
Start: 2024-09-23

## 2024-09-23 ASSESSMENT — PAIN SCALES - GENERAL: PAINLEVEL: NO PAIN (0)

## 2024-09-23 NOTE — PROGRESS NOTES
"Preventive Care Visit  [facility]  Radha Zhang MD, Family Medicine  Sep 23, 2024      Assessment & Plan     Routine general medical examination at a health care facility  Reviewed/updated Health Maintenance     Type 2 diabetes mellitus without complication, without long-term current use of insulin (H)  Adequate but sub-optimal control.  He notes more dietary indiscretions and will work on that.  We also discussed that adding a GLP-1 agonist is an option.  We'll reassess in 3 months and further consider that if A1c is not improved.  - BASIC METABOLIC PANEL  - Lipid panel reflex to direct LDL Non-fasting  - Albumin Random Urine Quantitative with Creat Ratio  - Hemoglobin A1c  - FOOT EXAM    Malodorous urine  - UA Macroscopic with reflex to Microscopic and Culture - Lab Collect    Hyperuricemia  He's been working on eating a low-purine diet since his first episode of gout earlier this year.    - Uric acid       BMI  Estimated body mass index is 35.6 kg/m  as calculated from the following:    Height as of this encounter: 1.82 m (5' 11.65\").    Weight as of this encounter: 117.9 kg (260 lb).   Weight management plan: He has lost weight and we are considering adding GLP-1 agonist  Wt Readings from Last 5 Encounters:   09/23/24 117.9 kg (260 lb)   04/23/24 118.3 kg (260 lb 12.8 oz)   01/23/24 120.8 kg (266 lb 6.4 oz)   11/25/23 124.7 kg (275 lb)   11/20/23 122.7 kg (270 lb 8 oz)        Counseling  Appropriate preventive services were addressed with this patient via screening, questionnaire, or discussion as appropriate for fall prevention, nutrition, physical activity, Tobacco-use cessation, social engagement, weight loss and cognition.  Checklist reviewing preventive services available has been given to the patient.  Reviewed patient's diet, addressing concerns and/or questions.   He is at risk for lack of exercise and has been provided with information to increase physical activity for " the benefit of his well-being.       See Patient Instructions        Clinton Garrison is a 64 year old, presenting for the following:  Physical (Physical)        9/23/2024     7:42 AM   Additional Questions   Roomed by Mag Oh        Via the Health Maintenance questionnaire, the patient has reported the following services have been completed -Eye Exam: [place] 2024-06-15, this information has been sent to the abstraction team.    Health Care Directive  Patient does not have a Health Care Directive or Living Will: Patient states has Advance Directive and will bring in a copy to clinic.    HPI    Numbness/tingling in right pointer finger has resolved.    Gout Follow-up  Have you had any gout attacks in the past year? Yes   If so, what part of the body? foot  Are you following a low purine diet (avoiding rich meats and alcohol)? Yes        Diabetes Follow-up  He is fasting today.  How often are you checking your blood sugar? Three times daily - usually 120-140 pre-meal, 170 post-meal  Blood sugar testing frequency justification:  Uncontrolled diabetes  What time of day are you checking your blood sugars (select all that apply)?  Before and after meals  Have you had any blood sugars above 200?  Yes with dietary indiscretions  Have you had any blood sugars below 70?  No  What symptoms do you notice when your blood sugar is low?  None  What concerns do you have today about your diabetes? None   Do you have any of these symptoms? (Select all that apply)  No numbness or tingling in feet.  No redness, sores or blisters on feet.  No complaints of excessive thirst.  No reports of blurry vision.  No significant changes to weight.      BP Readings from Last 2 Encounters:   09/23/24 113/78   04/23/24 110/70     Hemoglobin A1C (%)   Date Value   09/23/2024 7.4 (H)   04/23/2024 6.9 (H)   05/25/2021 5.7 (H)   08/24/2020 5.5     LDL Cholesterol Calculated (mg/dL)   Date Value   06/26/2023 54   03/24/2022 41   08/24/2020 84    11/25/2019 120 (H)           9/18/2024   General Health   How would you rate your overall physical health? Good   Feel stress (tense, anxious, or unable to sleep) To some extent      (!) STRESS CONCERN      9/18/2024   Nutrition   Three or more servings of calcium each day? Yes   Diet: Regular (no restrictions)   How many servings of fruit and vegetables per day? (!) 2-3   How many sweetened beverages each day? 0-1            9/18/2024   Exercise   Days per week of moderate/strenous exercise 3 days   Average minutes spent exercising at this level 60 min            9/18/2024   Social Factors   Frequency of gathering with friends or relatives Patient declined   Worry food won't last until get money to buy more No   Food not last or not have enough money for food? No   Do you have housing? (Housing is defined as stable permanent housing and does not include staying ouside in a car, in a tent, in an abandoned building, in an overnight shelter, or couch-surfing.) Yes   Are you worried about losing your housing? No   Lack of transportation? No   Unable to get utilities (heat,electricity)? No            9/18/2024   Fall Risk   Fallen 2 or more times in the past year? No   Trouble with walking or balance? No             9/18/2024   Dental   Dentist two times every year? Yes            9/18/2024   TB Screening   Were you born outside of the [place]? Yes        Today's PHQ-2 Score:       1/23/2024     8:15 AM   PHQ-2 ( 1999 [employer])   Q1: Little interest or pleasure in doing things 0   Q2: Feeling down, depressed or hopeless 0   PHQ-2 Score 0   Q1: Little interest or pleasure in doing things Not at all   Q2: Feeling down, depressed or hopeless Not at all   PHQ-2 Score 0         9/18/2024   Substance Use   Alcohol more than 3/day or more than 7/wk No   Do you use any other substances recreationally? No        Social History     Tobacco Use    Smoking status: Never    Smokeless tobacco: Never   Vaping Use    Vaping status: Never  Used   Substance Use Topics    Alcohol use: Yes     Comment: 1 glass of wine every few days    Drug use: No           9/18/2024   STI Screening   New sexual partner(s) since last STI/HIV test? No      Last PSA:   PSA   Date Value Ref Range Status   06/30/2011 1.14 0 - 4 ug/L Final     Prostate Specific Antigen Screen   Date Value Ref Range Status   06/26/2023 1.77 0.00 - 4.50 ng/mL Final   01/11/2022 1.46 0.00 - 4.00 ug/L Final     ASCVD Risk   The ASCVD Risk score (Carlie MILAN, et al., 2019) failed to calculate for the following reasons:    The valid total cholesterol range is 130 to 320 mg/dL       Reviewed and updated as needed this visit by Provider   Tobacco  Allergies  Meds  Problems  Med Hx  Surg Hx  Fam Hx            BP Readings from Last 3 Encounters:   09/23/24 113/78   04/23/24 110/70   01/23/24 138/72    Wt Readings from Last 3 Encounters:   09/23/24 117.9 kg (260 lb)   04/23/24 118.3 kg (260 lb 12.8 oz)   01/23/24 120.8 kg (266 lb 6.4 oz)               Patient Active Problem List   Diagnosis    Perennial allergic rhinitis    Type 2 diabetes mellitus without complication, without long-term current use of insulin (H)    Erectile dysfunction, unspecified erectile dysfunction type    Nocturnal leg cramps    BMI 35.0-39.9 with comorbidity (H)    Ureteral stone    Venous stasis dermatitis of both lower extremities    Chronic venous insufficiency of lower extremity    Acute gouty arthritis    Hyperuricemia    Intermittent asthma     Past Surgical History:   Procedure Laterality Date    APPENDECTOMY  1974    COLONOSCOPY  09/2011    2 hyperplastic polyps - no adenomas    COLONOSCOPY N/A 01/13/2022    Procedure: COLONOSCOPY, WITH POLYPECTOMY AND BIOPSY;  Surgeon: Fredis Maya MD;  Location:  GI    COMBINED CYSTOSCOPY, RETROGRADES, URETEROSCOPY, LASER HOLMIUM LITHOTRIPSY URETER(S), INSERT STENT Right 01/25/2023    Procedure: Cystoscopy, Right Ureteroscopy, Right Retrograde Pyelogram,  "Right Laser Lithotripsy, Right Ureteral Stent Exchange;  Surgeon: James Vanegas MD;  Location: [facility] OR    TONSILLECTOMY  1970       Social History     Tobacco Use    Smoking status: Never    Smokeless tobacco: Never   Substance Use Topics    Alcohol use: Yes     Comment: 1 glass of wine every few days     Family History   Problem Relation Age of Onset    Cancer Father         stomach    Gastrointestinal Disease Father         ulcers    Hepatitis Paternal Grandfather     Cirrhosis Paternal Grandfather     Anesthesia Reaction No family hx of     Clotting Disorder No family hx of                 Objective    Exam  /78 (BP Location: Right arm, Patient Position: Sitting, Cuff Size: Adult Large)   Pulse 82   Temp 97.3  F (36.3  C) (Temporal)   Resp 15   Ht 1.82 m (5' 11.65\")   Wt 117.9 kg (260 lb)   SpO2 95%   BMI 35.60 kg/m     Estimated body mass index is 35.6 kg/m  as calculated from the following:    Height as of this encounter: 1.82 m (5' 11.65\").    Weight as of this encounter: 117.9 kg (260 lb).    Physical Exam  GENERAL: healthy, alert and no distress  EYES: Eyes grossly normal to inspection, conjunctivae and sclerae normal  HENT: ear canals and TM's normal  NECK: no adenopathy, no asymmetry, masses, or scars and thyroid normal to palpation  RESP: lungs clear to auscultation - no rales, rhonchi or wheezes  CV: regular rate and rhythm, normal S1 S2, no S3 or S4, no murmur, click or rub  ABDOMEN: soft, nontender, no hepatosplenomegaly, no masses  MS: no gross musculoskeletal defects noted, no edema  SKIN: no suspicious lesions or rashes  NEURO: Grossly normal strength and tone, mentation intact and speech normal  PSYCH: mentation appears normal, affect normal/bright  DIABETIC FOOT EXAM:  Bilateral feet examined.  No lesions or deformities noted.  Skin is warm and dry.  Pedal pulses are intact.  Sensation is intact to nylon filament exam.       Signed Electronically by: Radha Zhang MD    "

## 2024-09-23 NOTE — PATIENT INSTRUCTIONS
Patient Education   Preventive Care Advice   This is general advice given by our system to help you stay healthy. However, your care team may have specific advice just for you. Please talk to your care team about your preventive care needs.  Nutrition  Eat 5 or more servings of fruits and vegetables each day.  Try wheat bread, brown rice and whole grain pasta (instead of white bread, rice, and pasta).  Get enough calcium and vitamin D. Check the label on foods and aim for 100% of the RDA (recommended daily allowance).  Lifestyle  Exercise at least 150 minutes each week  (30 minutes a day, 5 days a week).  Do muscle strengthening activities 2 days a week. These help control your weight and prevent disease.  No smoking.  Wear sunscreen to prevent skin cancer.  Have a dental exam and cleaning every 6 months.  Yearly exams  See your health care team every year to talk about:  Any changes in your health.  Any medicines your care team has prescribed.  Preventive care, family planning, and ways to prevent chronic diseases.  Shots (vaccines)   HPV shots (up to age 26), if you've never had them before.  Hepatitis B shots (up to age 59), if you've never had them before.  COVID-19 shot: Get this shot when it's due.  Flu shot: Get a flu shot every year.  Tetanus shot: Get a tetanus shot every 10 years.  Pneumococcal, hepatitis A, and RSV shots: Ask your care team if you need these based on your risk.  Shingles shot (for age 50 and up)  General health tests  Diabetes screening:  Starting at age 35, Get screened for diabetes at least every 3 years.  If you are younger than age 35, ask your care team if you should be screened for diabetes.  Cholesterol test: At age 39, start having a cholesterol test every 5 years, or more often if advised.  Bone density scan (DEXA): At age 50, ask your care team if you should have this scan for osteoporosis (brittle bones).  Hepatitis C: Get tested at least once in your life.  STIs (sexually  transmitted infections)  Before age 24: Ask your care team if you should be screened for STIs.  After age 24: Get screened for STIs if you're at risk. You are at risk for STIs (including HIV) if:  You are sexually active with more than one person.  You don't use condoms every time.  You or a partner was diagnosed with a sexually transmitted infection.  If you are at risk for HIV, ask about PrEP medicine to prevent HIV.  Get tested for HIV at least once in your life, whether you are at risk for HIV or not.  Cancer screening tests  Cervical cancer screening: If you have a cervix, begin getting regular cervical cancer screening tests starting at age 21.  Breast cancer scan (mammogram): If you've ever had breasts, begin having regular mammograms starting at age 40. This is a scan to check for breast cancer.  Colon cancer screening: It is important to start screening for colon cancer at age 45.  Have a colonoscopy test every 10 years (or more often if you're at risk) Or, ask your provider about stool tests like a FIT test every year or Cologuard test every 3 years.  To learn more about your testing options, visit:   .  For help making a decision, visit:   https://bit.ly/cb63377.  Prostate cancer screening test: If you have a prostate, ask your care team if a prostate cancer screening test (PSA) at age 55 is right for you.  Lung cancer screening: If you are a current or former smoker ages 50 to 80, ask your care team if ongoing lung cancer screenings are right for you.  For informational purposes only. Not to replace the advice of your health care provider. Copyright   2023 [employer]. All rights reserved. Clinically reviewed by the [facility]. [employer] [id number] - REV 01/24.

## 2024-09-24 RX ORDER — ROSUVASTATIN CALCIUM 5 MG/1
5 TABLET, COATED ORAL EVERY EVENING
Qty: 90 TABLET | Refills: 4 | Status: SHIPPED | OUTPATIENT
Start: 2024-09-24

## 2024-09-24 NOTE — RESULT ENCOUNTER NOTE
Tono Garrison,  These are nice results overall.  Your uric acid level has come down nicely with the low-purine diet.  Your urinalysis is normal as is your urine albumin (protein) level.  Urine albumin is a test for microscopic proteins in the urine - a sign of early kidney damage from hypertension and/or diabetes.  Your kidney function (creatinine and eGFR) is normal.      Your lipid panel (cholesterol) results look great and I renewed your prescription for rosuvastatin at the same dose.     Let's see where your A1c is at in 3 months and we could consider adding Ozempic (or similar medicine) to your regimen at that time.  Radha Zhang MD

## 2024-10-01 ENCOUNTER — HOSPITAL ENCOUNTER (EMERGENCY)
Facility: CLINIC | Age: 64
Discharge: HOME | End: 2024-10-02
Payer: COMMERCIAL

## 2024-10-01 DIAGNOSIS — R55 VASOVAGAL SYNCOPE: ICD-10-CM

## 2024-10-01 DIAGNOSIS — S61.001A AVULSION OF SKIN OF RIGHT THUMB, INITIAL ENCOUNTER: ICD-10-CM

## 2024-10-01 LAB — GLUCOSE BLDC GLUCOMTR-MCNC: 118 MG/DL (ref 70–99)

## 2024-10-01 PROCEDURE — 82962 GLUCOSE BLOOD TEST: CPT

## 2024-10-01 PROCEDURE — 99283 EMERGENCY DEPT VISIT LOW MDM: CPT

## 2024-10-01 ASSESSMENT — ACTIVITIES OF DAILY LIVING (ADL)
ADLS_ACUITY_SCORE: 35

## 2024-10-01 ASSESSMENT — COLUMBIA-SUICIDE SEVERITY RATING SCALE - C-SSRS
2. HAVE YOU ACTUALLY HAD ANY THOUGHTS OF KILLING YOURSELF IN THE PAST MONTH?: NO
1. IN THE PAST MONTH, HAVE YOU WISHED YOU WERE DEAD OR WISHED YOU COULD GO TO SLEEP AND NOT WAKE UP?: NO
6. HAVE YOU EVER DONE ANYTHING, STARTED TO DO ANYTHING, OR PREPARED TO DO ANYTHING TO END YOUR LIFE?: NO

## 2024-10-02 VITALS
SYSTOLIC BLOOD PRESSURE: 112 MMHG | RESPIRATION RATE: 18 BRPM | DIASTOLIC BLOOD PRESSURE: 69 MMHG | WEIGHT: 260 LBS | BODY MASS INDEX: 34.46 KG/M2 | OXYGEN SATURATION: 96 % | TEMPERATURE: 97.7 F | HEART RATE: 85 BPM | HEIGHT: 73 IN

## 2024-10-02 NOTE — ED NOTES
Wound assessed, dressing was removed and patient had a rubber band wrapped around his thumb 4 time his finger was purple when dressing was removed.  After dressing was removed the wound base started to bleed again. Patient has feeing returned to his finger.

## 2024-10-02 NOTE — ED TRIAGE NOTES
Patient was using a adelfo and cut his R thumb. States continues to bleed without pressure. Bleeding is controlled with a dressing at this time.      Triage Assessment (Adult)       Row Name 10/01/24 0341          Triage Assessment    Airway WDL WDL        Respiratory WDL    Respiratory WDL WDL        Skin Circulation/Temperature WDL    Skin Circulation/Temperature WDL X        Cardiac WDL    Cardiac WDL WDL        Peripheral/Neurovascular WDL    Peripheral Neurovascular WDL WDL        Cognitive/Neuro/Behavioral WDL    Cognitive/Neuro/Behavioral WDL WDL

## 2024-10-02 NOTE — ED NOTES
After redressing wound on pts finger he started to feel nauseated and weak, pt fainted x2 will low heart rate and hypotension.  Patient stated that he has no history of bradycardia and is not usually sensitive to blood or wounds.  Patient was assessed by EKG, PA and roomed in the Core for closer monitoring.

## 2024-10-02 NOTE — DISCHARGE INSTRUCTIONS
Discharge Instructions  Laceration (Cut)    You were seen today for a laceration (cut).  Your provider examined your laceration for any problems such a buried foreign body (like glass, a splinter, or gravel), or injury to blood vessels, tendons, and nerves.  Your provider may have also rinsed and/or scrubbed your laceration to help prevent an infection. It may not be possible to find all problems with your laceration on the first visit; occasionally foreign bodies or a tendon injury can go undetected.    Your laceration may have been closed in one of several ways:  No closure: many wounds will heal just fine without closure.  Stitches: regular stitches that require removal.  Staples: skin staples are often used in the scalp/head.  Wound adhesive (glue): skin glue can be used for certain lacerations and doesn t require removal.  Wound strips (aka Butterfly bandages or steri-strips): these are bandages that help to close a wound.  Absorbable stitches:  dissolving  stitches that go away on their own and usually don t require removal.    A small percentage of wounds will develop an infection regardless of how well the wound is cared for. Antibiotics are generally not indicated to prevent an infection so are only given for a small number of high-risk wounds. Some lacerations are too high risk to close, and are left open to heal because closure can increase the likelihood that an infection will develop.    Remember that all lacerations, no matter how expertly repaired, will cause scarring. We consider many factors, techniques, and materials, in our efforts to provide the best possible cosmetic outcome.    Generally, every Emergency Department visit should have a follow-up clinic visit with either a primary or a specialty clinic/provider. Please follow-up as instructed by your emergency provider today.     Return to the Emergency Department right away if:  You have more redness, swelling, pain, drainage (pus), a bad smell,  or red streaking from your laceration as these symptoms could indicate an infection.  You have a fever of 100.4 F or more.  You have bleeding that you cannot stop at home. If your cut starts to bleed, hold pressure on the bleeding area with a clean cloth or put pressure over the bandage.  If the bleeding does not stop after using constant pressure for 30 minutes, you should return to the Emergency Department for further treatment.  An area past the laceration is cool, pale, or blue compared with the other side, or has a slower return of color when squeezed.  Your dressing seems too tight or starts to get uncomfortable or painful. For children, signs of a problem might be irritability or restlessness.  You have loss of normal function or use of an area, such as being unable to straighten or bend a finger normally.  You have a numb area past the laceration.    Return to the Emergency Department or see your regular provider if:  The laceration starts to come open.   You have something coming out of the cut or a feeling that there is something in the laceration.  Your wound will not heal, or keeps breaking open. There can always be glass, wood, dirt or other things in any wound.  They will not always show up, even on x-rays.  If a wound does not heal, this may be why, and it is important to follow-up with your regular provider.    Home Care:  Take your dressing off in 12-24 hours, or as instructed by your provider, to check your laceration. Remove the dressing sooner if it seems too tight or painful, or if it is getting numb, tingly, or pale past the dressing.  Gently wash your laceration 1-2 times daily with clean water and mild soap. It is okay to shower or run clean water over the laceration, but do not let the laceration soak in water (no swimming).  If your laceration was closed with wound adhesive or strips: pat it dry and leave it open to the air. For all other repairs: after you wash your laceration, or at least  2 times a day, apply antibiotic ointment (such as Neosporin  or Bacitracin ) to the laceration, then cover it with a Band-Aid  or gauze.  Keep the laceration clean. Wear gloves or other protective clothing if you are around dirt.    Follow-up for removal:  If your wound was closed with staples or regular stitches, they need to be removed according to the instructions and timeline specified by your provider today.  If your wound was closed with absorbable ( dissolving ) sutures, they should fall out, dissolve, or not be visible in about one week. If they are still visible, then they should be removed according to the instructions and timeline specified by your provider today.    Scars:  To help minimize scarring:  Wear sunscreen over the healed laceration when out in the sun.  Massage the area regularly once healed.  You may apply Vitamin E to the healed wound.  Wait. Scars improve in appearance over months and years.    If you were given a prescription for medicine here today, be sure to read all of the information (including the package insert) that comes with your prescription.  This will include important information about the medicine, its side effects, and any warnings that you need to know about.  The pharmacist who fills the prescription can provide more information and answer questions you may have about the medicine.  If you have questions or concerns that the pharmacist cannot address, please call or return to the Emergency Department.       Remember that you can always come back to the Emergency Department if you are not able to see your regular provider in the amount of time listed above, if you get any new symptoms, or if there is anything that worries you.        Discharge Instructions  Syncope    Syncope (fainting) is a sudden, short loss of consciousness (passing out spell). People will usually fall to the ground when they faint or slump over if seated.  People may also shake when this happens, and  it can sometimes be difficult to tell the difference between syncope and a seizure. At this time, your provider does not find a reason to suspect that your fainting spell is a sign of anything dangerous or life-threatening.  However, sometimes the signs of serious illness do not show up right away.     Generally, every Emergency Department visit should have a follow-up clinic visit with either a primary or a specialty clinic/provider. Please follow-up as instructed by your emergency provider today.    Return to the Emergency Department if:  You faint again.   You have any significant bleeding.  You have chest pain or a fast or irregular heartbeat.  You feel short of breath.  You cough up any blood.  You have abdominal (belly) pain or unusual back pain.  You have ongoing vomiting (throwing up) or diarrhea (loose stools).  You have a black or tarry bowel movement, or blood in the stool or in your vomit.  You have a fever over 101 F.  You lose feeling or cannot move a part of your body or cannot talk normally.  You are confused, have a headache, cannot see well, or have a seizure.  DO NOT DRIVE. CALL 911 INSTEAD!    What can I do to help myself?  Follow any specific instructions that your provider discussed with you.  If you feel light-headed, make sure to sit down right away, even if you have to sit on the floor.  Follow up with your regular medical provider as discussed for further management. This may include lowering your blood pressure medications, insulin or other diabetic medications, checking your blood sugar more frequently, and drinking more fluids, taking medicines for vomiting or diarrhea or getting up slower.  If you were given a prescription for medicine here today, be sure to read all of the information (including the package insert) that comes with your prescription.  This will include important information about the medicine, its side effects, and any warnings that you need to know about.  The  pharmacist who fills the prescription can provide more information and answer questions you may have about the medicine.  If you have questions or concerns that the pharmacist cannot address, please call or return to the Emergency Department.   Remember that you can always come back to the Emergency Department if you are not able to see your regular provider in the amount of time listed above, if you get any new symptoms, or if there is anything that worries you.

## 2024-10-02 NOTE — ED PROVIDER NOTES
"  Emergency Department Note      History of Present Illness     Chief Complaint   Laceration      HPI   Bladimir Lezama is a 64 year old male with a history of type 2 diabetes here for evaluation of a laceration. Patient was using a adelfo today to peel Zucchini and cut his right thumb causing a laceration. He wrapped a rubber band around his thumb four times tightly prior to arrival.  He denies any severe pain. His last tetanus vaccine was in 2016.      Just after the rubber band was removed and the dressing was taken off, the patient looked at his thumb (which was bleeding) and fainted.  He states that he has not eaten for 8 hours.  He feels weak at this time.  He does not have any chest pain or shortness of breath.  Felt completely back to baseline after eating.    Independent Historian   None    Review of External Notes   [facility]    Past Medical History     Medical History and Problem List   Erectile dysfunction   Asthma   Impaired fasting glucose   Obesity   Type 2 diabetes   Uretal stone   Venous stasis dermatitis   Hyperuricemia       Medications   Metformin   Crestor   Revatio     Surgical History   Appendectomy   Tonsillectomy   Ureter stent insertion      Physical Exam     Patient Vitals for the past 24 hrs:   BP Temp Temp src Pulse Resp SpO2 Height Weight   10/01/24 2345 112/69 -- -- 85 18 96 % -- --   10/01/24 2300 101/70 -- -- 73 27 96 % -- --   10/01/24 2250 111/72 -- -- 75 17 96 % -- --   10/01/24 2245 -- -- -- 80 17 92 % -- --   10/01/24 2244 (!) 102/90 -- -- 80 -- -- -- --   10/01/24 2240 (!) 88/63 -- -- 50 18 96 % -- --   10/01/24 2238 -- -- -- (!) 49 23 96 % -- --   10/01/24 2235 -- -- -- (!) 39 16 98 % -- --   10/01/24 2234 -- -- -- 102 -- 98 % -- --   10/01/24 2230 (!) 60/42 -- -- (!) 24 -- -- -- --   10/01/24 2103 129/75 97.7  F (36.5  C) Temporal 100 18 97 % 1.854 m (6' 1\") 117.9 kg (260 lb)     Physical Exam  /69   Pulse 85   Temp 97.7  F (36.5  C) (Temporal)   Resp 18   Ht 1.854 m (6' " "1\")   Wt 117.9 kg (260 lb)   SpO2 96%   BMI 34.30 kg/m     General: Appears stated age. Examined in room 3.  Accompanied by wife.  Head: Atraumatic. Normocephalic.  EENT: PERRL. EOMI. Moist mucus membranes.   CV: Regular rate and rhythm.   Respiratory: Breathing comfortably on room air. Lungs clear to auscultation bilaterally without wheezes, rhonchi, or rales.  GI: Soft, non-distended. Non-tender abdomen. No rebound, rigidity, or guarding.   Msk: Extremities without tenderness to palpation or deformity.  Skin: Warm and dry. Right thumb has a 1.5 avulsion injury with scant bleeding.  Nail intact.  No exposed bone.  No tenderness.  Neuro: Awake, alert, and conversant. No focal neurologic deficits.   Psych: Appropriate mood and affect.      Diagnostics   EKG:  Sinus bradycardia, otherwise normal EKG    ECG results from 06/22/14   EKG 12 lead     Value    Interpretation ECG Click View Image link to view waveform and result       Lab Results   Labs Ordered and Resulted from Time of ED Arrival to Time of ED Departure   GLUCOSE BY METER - Abnormal       Result Value    GLUCOSE BY METER POCT 118 (*)        Independent Interpretation   None    ED Course      Medications Administered   Medications - No data to display    Procedures   Procedures     Discussion of Management   None    ED Course   ED Course as of 10/02/24 0036   Tue Oct 01, 2024   2242 I obtained history and examined the patient as noted above.        Additional Documentation  None    Medical Decision Making / Diagnosis     CMS Diagnoses: None    MIPS       None    MDM   Bladimir Lezama is a 64 year old male here today for evaluation of a laceration.  Patient has an avulsion injury to his right thumb that occurred when peeling zucchini earlier today.  This is about 1.5 cm.  This was cleaned and dressed by myself.  Bleeding easily controlled.  He is not on blood thinners.  He knows he needs to return for fevers, chills, significant swelling, pain or redness of " the finger.  There is no significant pain to suggest an underlying avulsion fracture.  No involvement of the nail.  Tetanus UTD.    While waiting for my evaluation.  The bandage was taken off and when the patient looked at it, he became lightheaded, pale, diaphoretic and had a syncopal event.  Vital signs taken while he was briefly unconscious revealed a low blood pressure and low heart rate.  He quickly came to and vital signs returned to baseline.  I spoke with patient regarding this.  He feels back to normal after eating.  His blood sugar is normal.  Do not think that lab work is indicated at this time and I suspect that this is all related to the finger laceration.  He was ambulated here with completely steady gait.  He did not have any further lightheadedness.  He had no time had any chest pain, shortness of breath.  EKG was performed shortly after the syncopal episode which showed sinus bradycardia.  Once again, suspect vasovagal.  If at any point, he has another syncopal event, he needs to return immediately.    Disposition   The patient was discharged.     Diagnosis     ICD-10-CM    1. Avulsion of skin of right thumb, initial encounter  S61.001A       2. Vasovagal syncope  R55              Scribe Disclosure:  I, Amelia Villatoro, am serving as a scribe at 9:31 PM on 10/1/2024 to document services personally performed by Latia Leach PA-C based on my observations and the provider's statements to me.        Latia Leach PA-C  10/02/24 0036

## 2024-11-25 ENCOUNTER — MYC REFILL (OUTPATIENT)
Dept: FAMILY MEDICINE | Facility: CLINIC | Age: 64
End: 2024-11-25
Payer: COMMERCIAL

## 2024-11-25 DIAGNOSIS — E11.65 TYPE 2 DIABETES MELLITUS WITH HYPERGLYCEMIA, WITHOUT LONG-TERM CURRENT USE OF INSULIN (H): ICD-10-CM

## 2024-12-12 ENCOUNTER — MYC MEDICAL ADVICE (OUTPATIENT)
Dept: FAMILY MEDICINE | Facility: CLINIC | Age: 64
End: 2024-12-12
Payer: COMMERCIAL

## 2025-01-12 DIAGNOSIS — E11.65 TYPE 2 DIABETES MELLITUS WITH HYPERGLYCEMIA, WITHOUT LONG-TERM CURRENT USE OF INSULIN (H): ICD-10-CM

## 2025-01-12 RX ORDER — BLOOD SUGAR DIAGNOSTIC
STRIP MISCELLANEOUS
Qty: 100 STRIP | Refills: 0 | OUTPATIENT
Start: 2025-01-12

## 2025-01-13 ENCOUNTER — OFFICE VISIT (OUTPATIENT)
Dept: FAMILY MEDICINE | Facility: CLINIC | Age: 65
End: 2025-01-13
Attending: FAMILY MEDICINE
Payer: COMMERCIAL

## 2025-01-13 VITALS
HEART RATE: 80 BPM | BODY MASS INDEX: 35.35 KG/M2 | RESPIRATION RATE: 16 BRPM | DIASTOLIC BLOOD PRESSURE: 79 MMHG | OXYGEN SATURATION: 97 % | SYSTOLIC BLOOD PRESSURE: 119 MMHG | HEIGHT: 72 IN | WEIGHT: 261 LBS | TEMPERATURE: 97.7 F

## 2025-01-13 DIAGNOSIS — E66.01 SEVERE OBESITY (BMI 35.0-39.9) WITH COMORBIDITY (H): ICD-10-CM

## 2025-01-13 DIAGNOSIS — E11.9 TYPE 2 DIABETES MELLITUS WITHOUT COMPLICATION, WITHOUT LONG-TERM CURRENT USE OF INSULIN (H): Primary | ICD-10-CM

## 2025-01-13 DIAGNOSIS — Z23 NEED FOR VACCINATION: ICD-10-CM

## 2025-01-13 LAB
EST. AVERAGE GLUCOSE BLD GHB EST-MCNC: 160 MG/DL
HBA1C MFR BLD: 7.2 % (ref 0–5.6)
HOLD SPECIMEN: NORMAL

## 2025-01-13 PROCEDURE — 83036 HEMOGLOBIN GLYCOSYLATED A1C: CPT | Performed by: FAMILY MEDICINE

## 2025-01-13 PROCEDURE — 99214 OFFICE O/P EST MOD 30 MIN: CPT | Mod: 25 | Performed by: FAMILY MEDICINE

## 2025-01-13 PROCEDURE — 36415 COLL VENOUS BLD VENIPUNCTURE: CPT | Performed by: FAMILY MEDICINE

## 2025-01-13 PROCEDURE — 90471 IMMUNIZATION ADMIN: CPT | Performed by: FAMILY MEDICINE

## 2025-01-13 PROCEDURE — 90678 RSV VACC PREF BIVALENT IM: CPT | Performed by: FAMILY MEDICINE

## 2025-01-13 PROCEDURE — 99207 PR FOOT EXAM NO CHARGE: CPT | Performed by: FAMILY MEDICINE

## 2025-01-13 ASSESSMENT — PAIN SCALES - GENERAL: PAINLEVEL_OUTOF10: NO PAIN (0)

## 2025-01-13 NOTE — NURSING NOTE
Prior to immunization administration, verified patients identity using patient s name and date of birth. Please see Immunization Activity for additional information.     Screening Questionnaire for Adult Immunization    Are you sick today?   No   Do you have allergies to medications, food, a vaccine component or latex?   No   Have you ever had a serious reaction after receiving a vaccination?   No   Do you have a long-term health problem with heart, lung, kidney, or metabolic disease (e.g., diabetes), asthma, a blood disorder, no spleen, complement component deficiency, a cochlear implant, or a spinal fluid leak?  Are you on long-term aspirin therapy?   Yes   Do you have cancer, leukemia, HIV/AIDS, or any other immune system problem?   No   Do you have a parent, brother, or sister with an immune system problem?   No   In the past 3 months, have you taken medications that affect  your immune system, such as prednisone, other steroids, or anticancer drugs; drugs for the treatment of rheumatoid arthritis, Crohn s disease, or psoriasis; or have you had radiation treatments?   No   Have you had a seizure, or a brain or other nervous system problem?   No   During the past year, have you received a transfusion of blood or blood    products, or been given immune (gamma) globulin or antiviral drug?   No   For women: Are you pregnant or is there a chance you could become       pregnant during the next month?   No   Have you received any vaccinations in the past 4 weeks?   No     Immunization questionnaire was positive for at least one answer.  Notified Dr. Zhang.      Patient instructed to remain in clinic for 15 minutes afterwards, and to report any adverse reactions.     Screening performed by Eric Pedro MA on 1/13/2025 at 8:23 AM.

## 2025-01-13 NOTE — PROGRESS NOTES
Assessment & Plan     Type 2 diabetes mellitus without complication, without long-term current use of insulin (H)  Fair but sub-optimal control.  We discussed option of adding Ozempic and he was agreeable.  Discussed R/B/SE of GLP-1 agonists including benefits of moderate weight loss and potential decrease in cardiovascular events, rare risks of BRE with dehydration, gallbladder disease, pancreatitis, and medullary thyroid cancer.  Reviewed potential GI side effects including nausea, vomiting, diarrhea and advised patient stay well-hydrated while on this medication and to notify clinic for mass in neck, dysphagia, severe upper abdominal pain.     - Hemoglobin A1c  - semaglutide (OZEMPIC) 2 MG/3ML pen  Dispense: 3 mL; Refill: 0  - FOOT EXAM    BMI 35.0-39.9 with comorbidity (H)  Severe obesity contributing to chronic conditions including DM.  Discussed that the GLP-1 agonist will also help with patient's goal of losing weight.      Need for vaccination  - RSV VACCINE (ABRYSVO)       Follow-Up in 6 months - appt was scheduled during visit.    Patient Instructions   Apply Vanicream to your feet daily, especially immediately after showering/bathing.    Message me after you get up to the 0.5 mg dose of Ozempic and let me know if you are ready to increase to 1 mg or if you want to hold the dose at 0.5 mg for longer.    Benefits of GLP-1 Agonists:  -Lowers blood sugar, typically without causing hypoglycemia  -Promotes weight loss  -May lower risk for cardiovascular events    Risks of GLP-1 Agonists:  -May increase risk of medullary thyroid cancer (a rare thyroid cancer).  Advise: Notify clinic if you have a personal or family history of thyroid cancer.  Notify clinic if you develop a mass in your neck or trouble swallowing.  -May cause kidney injury if you become dehydrated.  Advise: Increase your fluid intake and stay well-hydrated.    -Increases risk of gallbladder disease and pancreatitis.  Advise: Stop medication and  "seek urgent medical care for any severe upper abdominal pain.    Side Effects of GLP-1 Agonists:  -May cause nausea, vomiting, diarrhea   -May increase your resting heart rate       Clinton Garrison is a 64 year old, presenting for the following health issues:  Diabetes        1/13/2025     7:37 AM   Additional Questions   Roomed by Mag Oh     History of Present Illness       Diabetes:   He presents for follow up of diabetes.  He is checking home blood glucose three times daily.   He checks blood glucose before meals, after meals and at bedtime.  Blood glucose is sometimes over 200 and never under 70.  When his blood glucose is low, the patient is asymptomatic for confusion, blurred vision, lethargy and reports not feeling dizzy, shaky, or weak.   He has no concerns regarding his diabetes at this time.   He is not experiencing numbness or burning in feet, excessive thirst, blurry vision, weight changes or redness, sores or blisters on feet.               Diabetes Follow-up      BP Readings from Last 2 Encounters:   01/13/25 119/79   10/01/24 112/69     Hemoglobin A1C (%)   Date Value   01/13/2025 7.2 (H)   09/23/2024 7.4 (H)   05/25/2021 5.7 (H)   08/24/2020 5.5     LDL Cholesterol Calculated (mg/dL)   Date Value   09/23/2024 44   06/26/2023 54   08/24/2020 84   11/25/2019 120 (H)             Objective    /79 (BP Location: Right arm, Patient Position: Sitting, Cuff Size: Adult Large)   Pulse 80   Temp 97.7  F (36.5  C) (Temporal)   Resp 16   Ht 1.82 m (5' 11.65\")   Wt 118.4 kg (261 lb)   SpO2 97%   BMI 35.74 kg/m    Body mass index is 35.74 kg/m .  Physical Exam   GENERAL: healthy, alert and no distress  EYES: Eyes grossly normal to inspection, conjunctivae and sclerae normal  HENT: ear canals and TM's normal  NECK: no adenopathy, no asymmetry, masses, or scars and thyroid normal to palpation  RESP: lungs clear to auscultation - no rales, rhonchi or wheezes  CV: regular rate and rhythm, normal " S1 S2, no S3 or S4, no murmur, click or rub  NEURO: Grossly normal strength and tone, mentation intact and speech normal  PSYCH: mentation appears normal, affect normal/bright  DIABETIC FOOT EXAM:  Bilateral feet examined.  No lesions or deformities noted.  Skin with marked xerosis but is intact, warm and dry.  Trace PE. Pedal pulses are intact.  Sensation is intact to nylon filament exam.     Results for orders placed or performed in visit on 01/13/25   Hemoglobin A1c     Status: Abnormal   Result Value Ref Range    Estimated Average Glucose 160 (H) <117 mg/dL    Hemoglobin A1C 7.2 (H) 0.0 - 5.6 %   Extra Tube     Status: None    Narrative    The following orders were created for panel order Extra Tube.  Procedure                               Abnormality         Status                     ---------                               -----------         ------                     Extra Green Top (Lithium...[[id number]]                      Final result                 Please view results for these tests on the individual orders.   Extra Green Top (Lithium Heparin) Tube     Status: None   Result Value Ref Range    Hold Specimen JIC            Signed Electronically by: Radha Zhang MD

## 2025-01-26 ENCOUNTER — MYC REFILL (OUTPATIENT)
Dept: FAMILY MEDICINE | Facility: CLINIC | Age: 65
End: 2025-01-26
Payer: COMMERCIAL

## 2025-01-26 DIAGNOSIS — E11.9 TYPE 2 DIABETES MELLITUS WITHOUT COMPLICATION, WITHOUT LONG-TERM CURRENT USE OF INSULIN (H): ICD-10-CM

## 2025-01-27 RX ORDER — METFORMIN HYDROCHLORIDE 500 MG/1
TABLET, EXTENDED RELEASE ORAL
Qty: 360 TABLET | Refills: 3 | OUTPATIENT
Start: 2025-01-27

## 2025-02-21 DIAGNOSIS — E11.9 TYPE 2 DIABETES MELLITUS WITHOUT COMPLICATION, WITHOUT LONG-TERM CURRENT USE OF INSULIN (H): ICD-10-CM

## 2025-02-21 NOTE — TELEPHONE ENCOUNTER
Thank you. I am doing fine with 0.5. Shall I increase to 1.0? The previous prescription had written qty 3 but they gave me only 1. Thank you!   ---  No side effects. My current weight is my is 258 so not a significant decrease. Thank you     GI Ragland, BSN, PHN, RN-[facility]  [phone]

## 2025-02-21 NOTE — TELEPHONE ENCOUNTER
Ozempic refill requested.  [employer] message sent to patient for dose preference.    GI Ragland BSN, PHN, RN-[facility]  [phone]

## 2025-02-25 DIAGNOSIS — E11.9 TYPE 2 DIABETES MELLITUS WITHOUT COMPLICATION, WITHOUT LONG-TERM CURRENT USE OF INSULIN (H): ICD-10-CM

## 2025-04-20 ENCOUNTER — MYC MEDICAL ADVICE (OUTPATIENT)
Dept: FAMILY MEDICINE | Facility: CLINIC | Age: 65
End: 2025-04-20
Payer: COMMERCIAL

## 2025-04-20 DIAGNOSIS — E11.65 TYPE 2 DIABETES MELLITUS WITH HYPERGLYCEMIA, WITHOUT LONG-TERM CURRENT USE OF INSULIN (H): ICD-10-CM

## 2025-04-29 DIAGNOSIS — E11.9 TYPE 2 DIABETES MELLITUS WITHOUT COMPLICATION, WITHOUT LONG-TERM CURRENT USE OF INSULIN (H): ICD-10-CM

## 2025-04-29 NOTE — TELEPHONE ENCOUNTER
Pending Prescriptions:                       Disp   Refills    metFORMIN (GLUCOPHAGE XR) 500 MG 24 hr ta*360 ta*3            Si tablet by mouth with breakfast, 2 tablets with           lunch, and 1 tablet with dinner

## 2025-04-30 RX ORDER — METFORMIN HYDROCHLORIDE 500 MG/1
TABLET, EXTENDED RELEASE ORAL
Qty: 360 TABLET | Refills: 3 | OUTPATIENT
Start: 2025-04-30

## 2025-05-21 ENCOUNTER — TELEPHONE (OUTPATIENT)
Dept: FAMILY MEDICINE | Facility: CLINIC | Age: 65
End: 2025-05-21
Payer: COMMERCIAL

## 2025-05-21 DIAGNOSIS — E11.9 TYPE 2 DIABETES MELLITUS WITHOUT COMPLICATION, WITHOUT LONG-TERM CURRENT USE OF INSULIN (H): ICD-10-CM

## 2025-05-21 RX ORDER — METFORMIN HYDROCHLORIDE 500 MG/1
500 TABLET, EXTENDED RELEASE ORAL 2 TIMES DAILY WITH MEALS
COMMUNITY
Start: 2025-05-21

## 2025-05-21 NOTE — TELEPHONE ENCOUNTER
Hi Dr Espino-    Looks like the patient changed how they are taking their metformin.   I have pended the changes for your review/advice.  They confirmed today through mychart that they changed to taking it twice daily instead of the 4 times a day.

## 2025-06-17 DIAGNOSIS — E11.9 TYPE 2 DIABETES MELLITUS WITHOUT COMPLICATION, WITHOUT LONG-TERM CURRENT USE OF INSULIN (H): ICD-10-CM

## 2025-06-17 NOTE — TELEPHONE ENCOUNTER
Pending Prescriptions:                       Disp   Refills    Semaglutide (1 MG/DOSE) (OZEMPIC) 4 MG/3M*9 mL   1            Sig: Inject 1 mg subcutaneously every 7 days.

## 2025-06-21 ENCOUNTER — MYC REFILL (OUTPATIENT)
Dept: FAMILY MEDICINE | Facility: CLINIC | Age: 65
End: 2025-06-21
Payer: COMMERCIAL

## 2025-06-21 ENCOUNTER — MYC MEDICAL ADVICE (OUTPATIENT)
Dept: FAMILY MEDICINE | Facility: CLINIC | Age: 65
End: 2025-06-21
Payer: COMMERCIAL

## 2025-06-21 DIAGNOSIS — E11.9 TYPE 2 DIABETES MELLITUS WITHOUT COMPLICATION, WITHOUT LONG-TERM CURRENT USE OF INSULIN (H): ICD-10-CM

## 2025-06-24 NOTE — TELEPHONE ENCOUNTER
Rx resent to pharmacy as signed for refill on file.    GI Mcintyre, BSN, PHN, AMB-BC (she/her)  [facility] RN

## 2025-08-03 ENCOUNTER — HEALTH MAINTENANCE LETTER (OUTPATIENT)
Age: 65
End: 2025-08-03

## 2025-08-07 ENCOUNTER — MYC MEDICAL ADVICE (OUTPATIENT)
Dept: FAMILY MEDICINE | Facility: CLINIC | Age: 65
End: 2025-08-07
Payer: COMMERCIAL

## 2025-08-07 DIAGNOSIS — E11.9 TYPE 2 DIABETES MELLITUS WITHOUT COMPLICATION, WITHOUT LONG-TERM CURRENT USE OF INSULIN (H): ICD-10-CM

## 2025-08-31 ENCOUNTER — MYC REFILL (OUTPATIENT)
Dept: FAMILY MEDICINE | Facility: CLINIC | Age: 65
End: 2025-08-31
Payer: COMMERCIAL

## 2025-08-31 DIAGNOSIS — E11.9 TYPE 2 DIABETES MELLITUS WITHOUT COMPLICATION, WITHOUT LONG-TERM CURRENT USE OF INSULIN (H): ICD-10-CM

## (undated) DEVICE — GLOVE PROTEXIS W/NEU-THERA 7.5  2D73TE75

## (undated) DEVICE — PACK CYSTO CUSTOM ASC

## (undated) DEVICE — DRAPE C-ARM W/STRAPS 42X72" 07-CA104

## (undated) DEVICE — KIT ENDO FIRST STEP DISINFECTANT 200ML W/POUCH EP-4

## (undated) DEVICE — SPECIMEN CONTAINER 5OZ STERILE 2600SA

## (undated) DEVICE — TUBING SET THERMEDX UROLOGY SGL USE LL0006

## (undated) DEVICE — LINEN TOWEL PACK X5 5464

## (undated) DEVICE — SOL NACL 0.9% IRRIG 3000ML BAG 2B7477

## (undated) DEVICE — RAD RX CONRAY 60% (50ML) CHARGE PER ML

## (undated) DEVICE — GUIDEWIRE SENSOR DUAL FLEX STR 0.035"X150CM M0066703080

## (undated) DEVICE — PAD CHUX UNDERPAD 30X30"

## (undated) DEVICE — BASKET NITINOL TIPLESS HALO  1.5FRX120CM 554120

## (undated) DEVICE — SUCTION MANIFOLD NEPTUNE 2 SYS 1 PORT 702-025-000

## (undated) RX ORDER — FENTANYL CITRATE 50 UG/ML
INJECTION, SOLUTION INTRAMUSCULAR; INTRAVENOUS
Status: DISPENSED
Start: 2023-01-25

## (undated) RX ORDER — PROPOFOL 10 MG/ML
INJECTION, EMULSION INTRAVENOUS
Status: DISPENSED
Start: 2023-01-25

## (undated) RX ORDER — KETOROLAC TROMETHAMINE 30 MG/ML
INJECTION, SOLUTION INTRAMUSCULAR; INTRAVENOUS
Status: DISPENSED
Start: 2023-01-25

## (undated) RX ORDER — ONDANSETRON 2 MG/ML
INJECTION INTRAMUSCULAR; INTRAVENOUS
Status: DISPENSED
Start: 2023-01-25

## (undated) RX ORDER — DEXAMETHASONE SODIUM PHOSPHATE 4 MG/ML
INJECTION, SOLUTION INTRA-ARTICULAR; INTRALESIONAL; INTRAMUSCULAR; INTRAVENOUS; SOFT TISSUE
Status: DISPENSED
Start: 2023-01-25

## (undated) RX ORDER — FENTANYL CITRATE 50 UG/ML
INJECTION, SOLUTION INTRAMUSCULAR; INTRAVENOUS
Status: DISPENSED
Start: 2022-01-13

## (undated) RX ORDER — ACETAMINOPHEN 325 MG/1
TABLET ORAL
Status: DISPENSED
Start: 2023-01-25

## (undated) RX ORDER — CEFAZOLIN SODIUM 2 G/50ML
SOLUTION INTRAVENOUS
Status: DISPENSED
Start: 2023-01-25